# Patient Record
Sex: FEMALE | Race: WHITE | NOT HISPANIC OR LATINO | URBAN - METROPOLITAN AREA
[De-identification: names, ages, dates, MRNs, and addresses within clinical notes are randomized per-mention and may not be internally consistent; named-entity substitution may affect disease eponyms.]

---

## 2018-01-07 ENCOUNTER — HOSPITAL ENCOUNTER (INPATIENT)
Facility: HOSPITAL | Age: 78
LOS: 17 days | Discharge: NURSING, REHAB OR LONG-TERM CARE | DRG: 871 | End: 2018-01-24
Attending: EMERGENCY MEDICINE | Admitting: INTERNAL MEDICINE
Payer: COMMERCIAL

## 2018-01-07 ENCOUNTER — APPOINTMENT (EMERGENCY)
Dept: RADIOLOGY | Facility: HOSPITAL | Age: 78
DRG: 871 | End: 2018-01-07
Payer: COMMERCIAL

## 2018-01-07 ENCOUNTER — APPOINTMENT (EMERGENCY)
Dept: CT IMAGING | Facility: HOSPITAL | Age: 78
DRG: 871 | End: 2018-01-07
Payer: COMMERCIAL

## 2018-01-07 ENCOUNTER — APPOINTMENT (INPATIENT)
Dept: RADIOLOGY | Facility: HOSPITAL | Age: 78
DRG: 871 | End: 2018-01-07
Payer: COMMERCIAL

## 2018-01-07 DIAGNOSIS — K21.9 CHRONIC GERD: ICD-10-CM

## 2018-01-07 DIAGNOSIS — R53.81 DEBILITY: ICD-10-CM

## 2018-01-07 DIAGNOSIS — E87.6 HYPOKALEMIA: ICD-10-CM

## 2018-01-07 DIAGNOSIS — N89.8 VAGINAL DISCHARGE: ICD-10-CM

## 2018-01-07 DIAGNOSIS — E86.0 SEVERE DEHYDRATION: ICD-10-CM

## 2018-01-07 DIAGNOSIS — G93.40 ENCEPHALOPATHY: ICD-10-CM

## 2018-01-07 DIAGNOSIS — E11.01 HHNC (HYPERGLYCEMIC HYPEROSMOLAR NONKETOTIC COMA) (HCC): Primary | ICD-10-CM

## 2018-01-07 DIAGNOSIS — J96.01 ACUTE RESPIRATORY FAILURE WITH HYPOXIA (HCC): ICD-10-CM

## 2018-01-07 DIAGNOSIS — R77.8 ELEVATED TROPONIN: ICD-10-CM

## 2018-01-07 PROBLEM — N17.9 ACUTE KIDNEY INJURY (HCC): Status: ACTIVE | Noted: 2018-01-07

## 2018-01-07 LAB
ACETONE SERPL-MCNC: NEGATIVE MG/DL
ALBUMIN SERPL BCP-MCNC: 2.4 G/DL (ref 3.5–5)
ALBUMIN SERPL BCP-MCNC: 2.7 G/DL (ref 3.5–5)
ALBUMIN SERPL BCP-MCNC: 3.4 G/DL (ref 3.5–5)
ALP SERPL-CCNC: 128 U/L (ref 46–116)
ALP SERPL-CCNC: 148 U/L (ref 46–116)
ALP SERPL-CCNC: 187 U/L (ref 46–116)
ALT SERPL W P-5'-P-CCNC: 12 U/L (ref 12–78)
ALT SERPL W P-5'-P-CCNC: 14 U/L (ref 12–78)
ALT SERPL W P-5'-P-CCNC: 15 U/L (ref 12–78)
AMMONIA PLAS-SCNC: 50 UMOL/L (ref 11–35)
AMPHETAMINES SERPL QL SCN: NEGATIVE
ANION GAP SERPL CALCULATED.3IONS-SCNC: 14 MMOL/L (ref 4–13)
ANION GAP SERPL CALCULATED.3IONS-SCNC: 14 MMOL/L (ref 4–13)
ANION GAP SERPL CALCULATED.3IONS-SCNC: 16 MMOL/L (ref 4–13)
ANION GAP SERPL CALCULATED.3IONS-SCNC: 24 MMOL/L (ref 4–13)
APTT PPP: 29 SECONDS (ref 23–35)
ARTERIAL PATENCY WRIST A: YES
AST SERPL W P-5'-P-CCNC: 13 U/L (ref 5–45)
AST SERPL W P-5'-P-CCNC: 20 U/L (ref 5–45)
AST SERPL W P-5'-P-CCNC: 23 U/L (ref 5–45)
BACTERIA UR QL AUTO: ABNORMAL /HPF
BARBITURATES UR QL: NEGATIVE
BASE EX.OXY STD BLDV CALC-SCNC: 57.5 % (ref 60–80)
BASE EXCESS BLDA CALC-SCNC: 1.5 MMOL/L
BASE EXCESS BLDV CALC-SCNC: 4.3 MMOL/L
BASOPHILS # BLD MANUAL: 0 THOUSAND/UL (ref 0–0.1)
BASOPHILS NFR MAR MANUAL: 0 % (ref 0–1)
BENZODIAZ UR QL: NEGATIVE
BILIRUB SERPL-MCNC: 0.51 MG/DL (ref 0.2–1)
BILIRUB SERPL-MCNC: 0.56 MG/DL (ref 0.2–1)
BILIRUB SERPL-MCNC: 0.76 MG/DL (ref 0.2–1)
BILIRUB UR QL STRIP: NEGATIVE
BODY TEMPERATURE: 101.1 DEGREES FEHRENHEIT
BUN SERPL-MCNC: 21 MG/DL (ref 5–25)
BUN SERPL-MCNC: 22 MG/DL (ref 5–25)
BUN SERPL-MCNC: 22 MG/DL (ref 5–25)
BUN SERPL-MCNC: 23 MG/DL (ref 5–25)
CA-I BLD-SCNC: 1.1 MMOL/L (ref 1.12–1.32)
CALCIUM SERPL-MCNC: 10.5 MG/DL (ref 8.3–10.1)
CALCIUM SERPL-MCNC: 8.9 MG/DL (ref 8.3–10.1)
CALCIUM SERPL-MCNC: 9 MG/DL (ref 8.3–10.1)
CALCIUM SERPL-MCNC: 9.3 MG/DL (ref 8.3–10.1)
CHLORIDE SERPL-SCNC: 103 MMOL/L (ref 100–108)
CHLORIDE SERPL-SCNC: 109 MMOL/L (ref 100–108)
CHLORIDE SERPL-SCNC: 75 MMOL/L (ref 100–108)
CHLORIDE SERPL-SCNC: 97 MMOL/L (ref 100–108)
CK MB SERPL-MCNC: 1.9 % (ref 0–2.5)
CK MB SERPL-MCNC: 5.6 NG/ML (ref 0–5)
CK SERPL-CCNC: 122 U/L (ref 26–192)
CK SERPL-CCNC: 300 U/L (ref 26–192)
CLARITY UR: CLEAR
CO2 SERPL-SCNC: 27 MMOL/L (ref 21–32)
CO2 SERPL-SCNC: 27 MMOL/L (ref 21–32)
CO2 SERPL-SCNC: 28 MMOL/L (ref 21–32)
CO2 SERPL-SCNC: 30 MMOL/L (ref 21–32)
COCAINE UR QL: NEGATIVE
COLOR UR: YELLOW
CREAT SERPL-MCNC: 1.53 MG/DL (ref 0.6–1.3)
CREAT SERPL-MCNC: 1.75 MG/DL (ref 0.6–1.3)
CREAT SERPL-MCNC: 1.96 MG/DL (ref 0.6–1.3)
CREAT SERPL-MCNC: 2.26 MG/DL (ref 0.6–1.3)
EOSINOPHIL # BLD MANUAL: 0 THOUSAND/UL (ref 0–0.4)
EOSINOPHIL NFR BLD MANUAL: 0 % (ref 0–6)
ERYTHROCYTE [DISTWIDTH] IN BLOOD BY AUTOMATED COUNT: 13.8 % (ref 11.6–15.1)
ETHANOL SERPL-MCNC: <3 MG/DL (ref 0–3)
FIO2: ABNORMAL %
GFR SERPL CREATININE-BSD FRML MDRD: 20 ML/MIN/1.73SQ M
GFR SERPL CREATININE-BSD FRML MDRD: 24 ML/MIN/1.73SQ M
GFR SERPL CREATININE-BSD FRML MDRD: 27 ML/MIN/1.73SQ M
GFR SERPL CREATININE-BSD FRML MDRD: 32 ML/MIN/1.73SQ M
GLUCOSE SERPL-MCNC: 1470 MG/DL (ref 65–140)
GLUCOSE SERPL-MCNC: 394 MG/DL (ref 65–140)
GLUCOSE SERPL-MCNC: 470 MG/DL (ref 65–140)
GLUCOSE SERPL-MCNC: 681 MG/DL (ref 65–140)
GLUCOSE SERPL-MCNC: 930 MG/DL (ref 65–140)
GLUCOSE SERPL-MCNC: >500 MG/DL (ref 65–140)
GLUCOSE SERPL-MCNC: >500 MG/DL (ref 65–140)
GLUCOSE UR STRIP-MCNC: ABNORMAL MG/DL
HCO3 BLDA-SCNC: 26.3 MMOL/L (ref 22–28)
HCO3 BLDV-SCNC: 33.2 MMOL/L (ref 24–30)
HCT VFR BLD AUTO: 49.9 % (ref 34.8–46.1)
HGB BLD-MCNC: 17.9 G/DL (ref 11.5–15.4)
HGB UR QL STRIP.AUTO: ABNORMAL
INR PPP: 1.22 (ref 0.86–1.16)
KETONES UR STRIP-MCNC: NEGATIVE MG/DL
LACTATE SERPL-SCNC: 12.6 MMOL/L (ref 0.5–2)
LACTATE SERPL-SCNC: 3.2 MMOL/L (ref 0.5–2)
LACTATE SERPL-SCNC: 3.9 MMOL/L (ref 0.5–2)
LACTATE SERPL-SCNC: 5.3 MMOL/L (ref 0.5–2)
LACTATE SERPL-SCNC: 8.5 MMOL/L (ref 0.5–2)
LACTATE SERPL-SCNC: 9.2 MMOL/L (ref 0.5–2)
LEUKOCYTE ESTERASE UR QL STRIP: ABNORMAL
LG PLATELETS BLD QL SMEAR: PRESENT
LIPASE SERPL-CCNC: 285 U/L (ref 73–393)
LYMPHOCYTES # BLD AUTO: 0.83 THOUSAND/UL (ref 0.6–4.47)
LYMPHOCYTES # BLD AUTO: 5 % (ref 14–44)
MAGNESIUM SERPL-MCNC: 2.4 MG/DL (ref 1.6–2.6)
MCH RBC QN AUTO: 29.8 PG (ref 26.8–34.3)
MCHC RBC AUTO-ENTMCNC: 35.9 G/DL (ref 31.4–37.4)
MCV RBC AUTO: 83 FL (ref 82–98)
METAMYELOCYTES NFR BLD MANUAL: 1 % (ref 0–1)
METHADONE UR QL: NEGATIVE
MONOCYTES # BLD AUTO: 1.49 THOUSAND/UL (ref 0–1.22)
MONOCYTES NFR BLD: 9 % (ref 4–12)
NEUTROPHILS # BLD MANUAL: 14.06 THOUSAND/UL (ref 1.85–7.62)
NEUTS SEG NFR BLD AUTO: 85 % (ref 43–75)
NITRITE UR QL STRIP: NEGATIVE
NON-SQ EPI CELLS URNS QL MICRO: ABNORMAL /HPF
NRBC BLD AUTO-RTO: 0 /100 WBCS
O2 CT BLDA-SCNC: 21 ML/DL (ref 16–23)
O2 CT BLDV-SCNC: 13.5 ML/DL
OPIATES UR QL SCN: NEGATIVE
OSMOLALITY UR/SERPL-RTO: 373 MMOL/KG (ref 282–298)
OSMOLALITY UR/SERPL-RTO: 376 MMOL/KG (ref 282–298)
OTHER STN SPEC: ABNORMAL
OXYHGB MFR BLDA: 98.3 % (ref 94–97)
PCO2 BLDA: 41.8 MM HG (ref 36–44)
PCO2 BLDV: 67 MM HG (ref 42–50)
PCP UR QL: NEGATIVE
PH BLDA: 7.42 [PH] (ref 7.35–7.45)
PH BLDV: 7.31 [PH] (ref 7.3–7.4)
PH UR STRIP.AUTO: 5 [PH] (ref 4.5–8)
PLATELET # BLD AUTO: 590 THOUSANDS/UL (ref 149–390)
PLATELET BLD QL SMEAR: ABNORMAL
PMV BLD AUTO: 12.5 FL (ref 8.9–12.7)
PO2 BLDA: 127.6 MM HG (ref 75–129)
PO2 BLDV: 33.6 MM HG (ref 35–45)
POTASSIUM SERPL-SCNC: 2.5 MMOL/L (ref 3.5–5.3)
POTASSIUM SERPL-SCNC: 3.2 MMOL/L (ref 3.5–5.3)
POTASSIUM SERPL-SCNC: 3.4 MMOL/L (ref 3.5–5.3)
POTASSIUM SERPL-SCNC: 4 MMOL/L (ref 3.5–5.3)
PROT SERPL-MCNC: 10.1 G/DL (ref 6.4–8.2)
PROT SERPL-MCNC: 7.1 G/DL (ref 6.4–8.2)
PROT SERPL-MCNC: 7.9 G/DL (ref 6.4–8.2)
PROT UR STRIP-MCNC: NEGATIVE MG/DL
PROTHROMBIN TIME: 15.5 SECONDS (ref 12.1–14.4)
RBC # BLD AUTO: 5.79 MILLION/UL (ref 3.81–5.12)
RBC #/AREA URNS AUTO: ABNORMAL /HPF
RBC MORPH BLD: NORMAL
SODIUM SERPL-SCNC: 127 MMOL/L (ref 136–145)
SODIUM SERPL-SCNC: 138 MMOL/L (ref 136–145)
SODIUM SERPL-SCNC: 149 MMOL/L (ref 136–145)
SODIUM SERPL-SCNC: 150 MMOL/L (ref 136–145)
SP GR UR STRIP.AUTO: <=1.005 (ref 1–1.03)
SPECIMEN SOURCE: ABNORMAL
SPECIMEN SOURCE: ABNORMAL
THC UR QL: NEGATIVE
TOTAL CELLS COUNTED SPEC: 100
TROPONIN I BLD-MCNC: 0.19 NG/ML (ref 0–0.08)
UROBILINOGEN UR QL STRIP.AUTO: 0.2 E.U./DL
VENT- APRV: ABNORMAL
WBC # BLD AUTO: 16.54 THOUSAND/UL (ref 4.31–10.16)
WBC #/AREA URNS AUTO: ABNORMAL /HPF

## 2018-01-07 PROCEDURE — 83605 ASSAY OF LACTIC ACID: CPT | Performed by: NURSE PRACTITIONER

## 2018-01-07 PROCEDURE — 71045 X-RAY EXAM CHEST 1 VIEW: CPT

## 2018-01-07 PROCEDURE — 70450 CT HEAD/BRAIN W/O DYE: CPT

## 2018-01-07 PROCEDURE — 36415 COLL VENOUS BLD VENIPUNCTURE: CPT | Performed by: EMERGENCY MEDICINE

## 2018-01-07 PROCEDURE — 93005 ELECTROCARDIOGRAM TRACING: CPT | Performed by: EMERGENCY MEDICINE

## 2018-01-07 PROCEDURE — 82805 BLOOD GASES W/O2 SATURATION: CPT | Performed by: NURSE PRACTITIONER

## 2018-01-07 PROCEDURE — 99285 EMERGENCY DEPT VISIT HI MDM: CPT

## 2018-01-07 PROCEDURE — 80048 BASIC METABOLIC PNL TOTAL CA: CPT | Performed by: INTERNAL MEDICINE

## 2018-01-07 PROCEDURE — 82805 BLOOD GASES W/O2 SATURATION: CPT | Performed by: EMERGENCY MEDICINE

## 2018-01-07 PROCEDURE — 96376 TX/PRO/DX INJ SAME DRUG ADON: CPT

## 2018-01-07 PROCEDURE — 82553 CREATINE MB FRACTION: CPT | Performed by: NURSE PRACTITIONER

## 2018-01-07 PROCEDURE — 96375 TX/PRO/DX INJ NEW DRUG ADDON: CPT

## 2018-01-07 PROCEDURE — 5A1945Z RESPIRATORY VENTILATION, 24-96 CONSECUTIVE HOURS: ICD-10-PCS | Performed by: HOSPITALIST

## 2018-01-07 PROCEDURE — 85027 COMPLETE CBC AUTOMATED: CPT | Performed by: EMERGENCY MEDICINE

## 2018-01-07 PROCEDURE — 82948 REAGENT STRIP/BLOOD GLUCOSE: CPT

## 2018-01-07 PROCEDURE — 0BH17EZ INSERTION OF ENDOTRACHEAL AIRWAY INTO TRACHEA, VIA NATURAL OR ARTIFICIAL OPENING: ICD-10-PCS | Performed by: HOSPITALIST

## 2018-01-07 PROCEDURE — 87077 CULTURE AEROBIC IDENTIFY: CPT

## 2018-01-07 PROCEDURE — 36620 INSERTION CATHETER ARTERY: CPT

## 2018-01-07 PROCEDURE — 93005 ELECTROCARDIOGRAM TRACING: CPT

## 2018-01-07 PROCEDURE — 82140 ASSAY OF AMMONIA: CPT | Performed by: EMERGENCY MEDICINE

## 2018-01-07 PROCEDURE — 82330 ASSAY OF CALCIUM: CPT | Performed by: NURSE PRACTITIONER

## 2018-01-07 PROCEDURE — 94002 VENT MGMT INPAT INIT DAY: CPT

## 2018-01-07 PROCEDURE — 80307 DRUG TEST PRSMV CHEM ANLYZR: CPT | Performed by: EMERGENCY MEDICINE

## 2018-01-07 PROCEDURE — 87040 BLOOD CULTURE FOR BACTERIA: CPT | Performed by: EMERGENCY MEDICINE

## 2018-01-07 PROCEDURE — 82550 ASSAY OF CK (CPK): CPT | Performed by: NURSE PRACTITIONER

## 2018-01-07 PROCEDURE — 80320 DRUG SCREEN QUANTALCOHOLS: CPT | Performed by: EMERGENCY MEDICINE

## 2018-01-07 PROCEDURE — 85007 BL SMEAR W/DIFF WBC COUNT: CPT | Performed by: EMERGENCY MEDICINE

## 2018-01-07 PROCEDURE — 87086 URINE CULTURE/COLONY COUNT: CPT

## 2018-01-07 PROCEDURE — 81002 URINALYSIS NONAUTO W/O SCOPE: CPT | Performed by: EMERGENCY MEDICINE

## 2018-01-07 PROCEDURE — 82009 KETONE BODYS QUAL: CPT | Performed by: EMERGENCY MEDICINE

## 2018-01-07 PROCEDURE — 87077 CULTURE AEROBIC IDENTIFY: CPT | Performed by: EMERGENCY MEDICINE

## 2018-01-07 PROCEDURE — 85730 THROMBOPLASTIN TIME PARTIAL: CPT | Performed by: EMERGENCY MEDICINE

## 2018-01-07 PROCEDURE — 96374 THER/PROPH/DIAG INJ IV PUSH: CPT

## 2018-01-07 PROCEDURE — 83690 ASSAY OF LIPASE: CPT | Performed by: EMERGENCY MEDICINE

## 2018-01-07 PROCEDURE — 83930 ASSAY OF BLOOD OSMOLALITY: CPT | Performed by: EMERGENCY MEDICINE

## 2018-01-07 PROCEDURE — 83930 ASSAY OF BLOOD OSMOLALITY: CPT | Performed by: NURSE PRACTITIONER

## 2018-01-07 PROCEDURE — 87186 SC STD MICRODIL/AGAR DIL: CPT | Performed by: EMERGENCY MEDICINE

## 2018-01-07 PROCEDURE — 80053 COMPREHEN METABOLIC PANEL: CPT | Performed by: NURSE PRACTITIONER

## 2018-01-07 PROCEDURE — 83605 ASSAY OF LACTIC ACID: CPT | Performed by: EMERGENCY MEDICINE

## 2018-01-07 PROCEDURE — 96361 HYDRATE IV INFUSION ADD-ON: CPT

## 2018-01-07 PROCEDURE — 83735 ASSAY OF MAGNESIUM: CPT | Performed by: NURSE PRACTITIONER

## 2018-01-07 PROCEDURE — 85610 PROTHROMBIN TIME: CPT | Performed by: EMERGENCY MEDICINE

## 2018-01-07 PROCEDURE — 87186 SC STD MICRODIL/AGAR DIL: CPT

## 2018-01-07 PROCEDURE — 84484 ASSAY OF TROPONIN QUANT: CPT

## 2018-01-07 PROCEDURE — 74176 CT ABD & PELVIS W/O CONTRAST: CPT

## 2018-01-07 PROCEDURE — 80053 COMPREHEN METABOLIC PANEL: CPT | Performed by: EMERGENCY MEDICINE

## 2018-01-07 PROCEDURE — 81001 URINALYSIS AUTO W/SCOPE: CPT

## 2018-01-07 RX ORDER — ONDANSETRON 2 MG/ML
4 INJECTION INTRAMUSCULAR; INTRAVENOUS ONCE
Status: COMPLETED | OUTPATIENT
Start: 2018-01-07 | End: 2018-01-07

## 2018-01-07 RX ORDER — PROPOFOL 10 MG/ML
5-50 INJECTION, EMULSION INTRAVENOUS
Status: DISCONTINUED | OUTPATIENT
Start: 2018-01-07 | End: 2018-01-10

## 2018-01-07 RX ORDER — POTASSIUM CHLORIDE 20 MEQ/1
40 TABLET, EXTENDED RELEASE ORAL ONCE
Status: COMPLETED | OUTPATIENT
Start: 2018-01-07 | End: 2018-01-07

## 2018-01-07 RX ORDER — DEXTROSE AND SODIUM CHLORIDE 5; .45 G/100ML; G/100ML
125 INJECTION, SOLUTION INTRAVENOUS CONTINUOUS
Status: DISCONTINUED | OUTPATIENT
Start: 2018-01-07 | End: 2018-01-09

## 2018-01-07 RX ORDER — HEPARIN SODIUM 5000 [USP'U]/ML
5000 INJECTION, SOLUTION INTRAVENOUS; SUBCUTANEOUS EVERY 8 HOURS SCHEDULED
Status: DISCONTINUED | OUTPATIENT
Start: 2018-01-07 | End: 2018-01-24 | Stop reason: HOSPADM

## 2018-01-07 RX ORDER — HALOPERIDOL 5 MG/ML
2 INJECTION INTRAMUSCULAR EVERY 6 HOURS PRN
Status: DISCONTINUED | OUTPATIENT
Start: 2018-01-07 | End: 2018-01-10

## 2018-01-07 RX ORDER — POTASSIUM CHLORIDE AND SODIUM CHLORIDE 900; 300 MG/100ML; MG/100ML
125 INJECTION, SOLUTION INTRAVENOUS CONTINUOUS
Status: DISCONTINUED | OUTPATIENT
Start: 2018-01-07 | End: 2018-01-07

## 2018-01-07 RX ORDER — DEXTROSE MONOHYDRATE 50 MG/ML
125 INJECTION, SOLUTION INTRAVENOUS CONTINUOUS
Status: DISCONTINUED | OUTPATIENT
Start: 2018-01-07 | End: 2018-01-09

## 2018-01-07 RX ORDER — FOLIC ACID 5 MG/ML
1 INJECTION, SOLUTION INTRAMUSCULAR; INTRAVENOUS; SUBCUTANEOUS DAILY
Status: DISCONTINUED | OUTPATIENT
Start: 2018-01-08 | End: 2018-01-07

## 2018-01-07 RX ORDER — DIAZEPAM 5 MG/ML
2.5 INJECTION, SOLUTION INTRAMUSCULAR; INTRAVENOUS ONCE
Status: COMPLETED | OUTPATIENT
Start: 2018-01-07 | End: 2018-01-07

## 2018-01-07 RX ORDER — POTASSIUM CHLORIDE 20 MEQ/1
40 TABLET, EXTENDED RELEASE ORAL ONCE
Status: DISCONTINUED | OUTPATIENT
Start: 2018-01-07 | End: 2018-01-07

## 2018-01-07 RX ORDER — ALBUMIN (HUMAN) 12.5 G/50ML
50 SOLUTION INTRAVENOUS ONCE
Status: COMPLETED | OUTPATIENT
Start: 2018-01-07 | End: 2018-01-08

## 2018-01-07 RX ORDER — PROPOFOL 10 MG/ML
INJECTION, EMULSION INTRAVENOUS
Status: COMPLETED
Start: 2018-01-07 | End: 2018-01-08

## 2018-01-07 RX ADMIN — Medication 20 MEQ: at 11:24

## 2018-01-07 RX ADMIN — FAMOTIDINE 20 MG: 10 INJECTION, SOLUTION INTRAVENOUS at 11:15

## 2018-01-07 RX ADMIN — SODIUM CHLORIDE 1000 ML: 0.9 INJECTION, SOLUTION INTRAVENOUS at 12:38

## 2018-01-07 RX ADMIN — DIAZEPAM 2.5 MG: 5 INJECTION, SOLUTION INTRAMUSCULAR; INTRAVENOUS at 18:59

## 2018-01-07 RX ADMIN — CEFEPIME HYDROCHLORIDE 1000 MG: 1 INJECTION, SOLUTION INTRAVENOUS at 15:02

## 2018-01-07 RX ADMIN — POTASSIUM CHLORIDE 40 MEQ: 1500 TABLET, EXTENDED RELEASE ORAL at 14:21

## 2018-01-07 RX ADMIN — HEPARIN SODIUM 5000 UNITS: 5000 INJECTION, SOLUTION INTRAVENOUS; SUBCUTANEOUS at 14:14

## 2018-01-07 RX ADMIN — ONDANSETRON 4 MG: 2 INJECTION INTRAMUSCULAR; INTRAVENOUS at 10:06

## 2018-01-07 RX ADMIN — SODIUM CHLORIDE 18 UNITS/HR: 9 INJECTION, SOLUTION INTRAVENOUS at 18:01

## 2018-01-07 RX ADMIN — ALBUMIN HUMAN 50 G: 0.25 SOLUTION INTRAVENOUS at 22:41

## 2018-01-07 RX ADMIN — SODIUM CHLORIDE 12 UNITS/HR: 9 INJECTION, SOLUTION INTRAVENOUS at 22:07

## 2018-01-07 RX ADMIN — LEVETIRACETAM 1000 MG: 100 INJECTION, SOLUTION INTRAVENOUS at 20:57

## 2018-01-07 RX ADMIN — POTASSIUM CHLORIDE AND SODIUM CHLORIDE 100 ML/HR: 900; 300 INJECTION, SOLUTION INTRAVENOUS at 15:13

## 2018-01-07 RX ADMIN — ONDANSETRON 4 MG: 2 INJECTION INTRAMUSCULAR; INTRAVENOUS at 12:02

## 2018-01-07 RX ADMIN — DEXTROSE AND SODIUM CHLORIDE 125 ML/HR: 5; 450 INJECTION, SOLUTION INTRAVENOUS at 20:00

## 2018-01-07 RX ADMIN — SODIUM CHLORIDE 1000 ML: 0.9 INJECTION, SOLUTION INTRAVENOUS at 10:48

## 2018-01-07 RX ADMIN — HEPARIN SODIUM 5000 UNITS: 5000 INJECTION, SOLUTION INTRAVENOUS; SUBCUTANEOUS at 21:33

## 2018-01-07 RX ADMIN — HALOPERIDOL LACTATE 2 MG: 5 INJECTION, SOLUTION INTRAMUSCULAR at 17:52

## 2018-01-07 RX ADMIN — POTASSIUM CHLORIDE 20 MEQ: 2 INJECTION, SOLUTION, CONCENTRATE INTRAVENOUS at 18:01

## 2018-01-07 RX ADMIN — SODIUM CHLORIDE 1000 ML: 0.9 INJECTION, SOLUTION INTRAVENOUS at 09:59

## 2018-01-07 NOTE — ED NOTES
Patient began vomiting on attempt to insert vang  MD aware meds ordered   Will place vang after patient is no longer nauseous or vomiting     Rosie Colmenares RN  01/07/18 9317

## 2018-01-07 NOTE — ED PROVIDER NOTES
History  Chief Complaint   Patient presents with    Failure To Thrive     Patient lives alone and neighbor checks on patient a few times a day  Patient found this am on her floor up against her bed lying on her L side  Aproximate down time was 2 hrs  Patient is reported to be taking meds but no meds were found in the house  Food in patient s refridgerator has been  for 2 months  Patient stating "she doesnt want to be here any more" to EMS  Patient incontinent in soiled brief     Pt  Was found down on the ground next to her bed this am   She was there for about 2 hours  Pt  Lives alone and the neighbor checks on her once in awhile but all the food in her refrigerator was  - they don't think that she's eaten in awhile  No fevers  Pt  Answers questions appropriately in the ER but has eyes closed and not talking much  She c/o abd  Pain and vomiting today, no cp, no sob  None       Past Medical History:   Diagnosis Date    Arthritis     Diabetes mellitus ([facility] [address] )     Hypertension     Memory loss        History reviewed  No pertinent surgical history  History reviewed  No pertinent family history  I have reviewed and agree with the history as documented  Social History   Substance Use Topics    Smoking status: Former Smoker    Smokeless tobacco: Not on file    Alcohol use Not on file        Review of Systems   Unable to perform ROS: Mental status change   Constitutional: Negative for appetite change, fatigue and fever  HENT: Negative for rhinorrhea and sore throat  Respiratory: Negative for cough, shortness of breath and wheezing  Cardiovascular: Negative for chest pain and leg swelling  Gastrointestinal: Negative for abdominal pain, diarrhea and vomiting  Genitourinary: Negative for dysuria and flank pain  Musculoskeletal: Negative for back pain and neck pain  Skin: Negative for rash  Neurological: Negative for headaches     Psychiatric/Behavioral: Mood normal       Physical Exam  ED Triage Vitals   Temperature Pulse Respirations Blood Pressure SpO2   01/07/18 0921 01/07/18 0921 01/07/18 0921 01/07/18 0921 01/07/18 0924   (!) 96 8 °F (36 °C) (!) 133 (!) 36 130/61 100 %      Temp Source Heart Rate Source Patient Position - Orthostatic VS BP Location FiO2 (%)   01/07/18 0921 01/07/18 0921 01/07/18 0921 01/07/18 0921 --   Temporal Monitor Lying Right arm       Pain Score       01/07/18 1305       No Pain           Orthostatic Vital Signs  Vitals:    01/16/18 2304 01/17/18 0745 01/17/18 1514 01/17/18 2332   BP: 113/63 101/67 107/59 102/50   Pulse: (!) 109 (!) 112 97 98   Patient Position - Orthostatic VS: Lying Lying Lying Lying       Physical Exam   Constitutional: She appears well-developed  HENT:   Head: Normocephalic and atraumatic  Dry MM   Eyes: Pupils are equal, round, and reactive to light  Neck: Normal range of motion  Neck supple  nontender   Cardiovascular:   tachycardic   Pulmonary/Chest: Effort normal and breath sounds normal  No respiratory distress  Abdominal: Soft  Periumbilical tenderness, no r/g   Musculoskeletal: Normal range of motion  Neurological: She is alert  Oriented to person only, eyes closed - opens to command and answers questions in short one word answers at times  Skin: Skin is warm and dry  Nursing note and vitals reviewed        ED Medications  Medications   potassium chloride 20 mEq in D5W 100 mL (20 mEq Intravenous Not Given 1/7/18 1430)   heparin (porcine) subcutaneous injection 5,000 Units (5,000 Units Subcutaneous Given 1/18/18 0538)   aspirin chewable tablet 81 mg (81 mg Oral Given 1/17/18 0903)   senna-docusate sodium (SENOKOT S) 8 6-50 mg per tablet 1 tablet (1 tablet Oral Given 1/17/18 2218)   insulin lispro (HumaLOG) 100 units/mL subcutaneous injection 1-6 Units (2 Units Subcutaneous Given 1/17/18 1814)   insulin lispro (HumaLOG) 100 units/mL subcutaneous injection 1-6 Units (1 Units Subcutaneous Not Given 1/17/18 2217)   guaiFENesin (ROBITUSSIN) oral solution 200 mg (200 mg Oral Not Given 1/18/18 0521)   acetaminophen (TYLENOL) tablet 975 mg (975 mg Oral Given 1/18/18 0535)   insulin glargine (LANTUS) subcutaneous injection 15 Units (15 Units Subcutaneous Given 1/17/18 2219)   melatonin tablet 3 mg (not administered)   cholecalciferol (VITAMIN D3) tablet 1,000 Units (not administered)   sodium chloride 0 9 % bolus 1,000 mL (0 mL Intravenous Stopped 1/7/18 1115)   ondansetron (ZOFRAN) injection 4 mg (4 mg Intravenous Given 1/7/18 1006)   sodium chloride 0 9 % bolus 1,000 mL (0 mL Intravenous Stopped 1/7/18 1239)   famotidine (PEPCID) injection 20 mg (20 mg Intravenous Given 1/7/18 1115)   ondansetron (ZOFRAN) injection 4 mg (4 mg Intravenous Given 1/7/18 1202)   sodium chloride 0 9 % bolus 1,000 mL (0 mL Intravenous Stopped 1/7/18 1425)   potassium chloride (K-DUR,KLOR-CON) CR tablet 40 mEq (40 mEq Oral Given 1/7/18 1421)   potassium chloride 20 mEq in D5W 100 mL (20 mEq Intravenous Given 1/7/18 1801)   diazepam (VALIUM) injection 2 5 mg (2 5 mg Intravenous Given 1/7/18 1859)   levETIRAcetam (KEPPRA) 1,000 mg in sodium chloride 0 9 % 100 mL IVPB (0 mg Intravenous Stopped 1/8/18 1502)   albumin human (FLEXBUMIN) 25 % injection 50 g (0 g Intravenous Stopped 1/8/18 0747)   potassium chloride 20 mEq in D5W 100 mL (20 mEq Intravenous Given 1/8/18 0159)   potassium chloride 10 % oral solution 40 mEq (40 mEq Oral Given 1/8/18 0151)   potassium phosphate 30 mmol in sodium chloride 0 9 % 250 mL infusion (0 mmol Intravenous Stopped 1/9/18 0908)   lactated ringers bolus 500 mL (0 mL Intravenous Stopped 1/8/18 1600)   furosemide (LASIX) injection 40 mg (40 mg Intravenous Given 1/10/18 0612)   potassium phosphate 21 mmol in sodium chloride 0 9 % 250 mL infusion (0 mmol Intravenous Stopped 1/10/18 1250)   furosemide (LASIX) injection 40 mg (40 mg Intravenous Given 1/11/18 1054)   furosemide (LASIX) injection 40 mg (40 mg Intravenous Given 1/12/18 1151)   potassium chloride (K-DUR,KLOR-CON) CR tablet 20 mEq (20 mEq Oral Given 1/12/18 1852)   potassium chloride 10 % oral solution 40 mEq (40 mEq Oral Given 1/12/18 1358)   potassium chloride (K-DUR,KLOR-CON) CR tablet 20 mEq (20 mEq Oral Given 1/14/18 1503)   potassium chloride 10 % oral solution 40 mEq (40 mEq Oral Given 1/16/18 0912)   potassium chloride 10 % oral solution 40 mEq (40 mEq Oral Given 1/16/18 1853)   levofloxacin (LEVAQUIN) IVPB (premix) 500 mg (500 mg Intravenous New Bag 1/16/18 1406)   lactulose 20 g/30 mL oral solution 20 g (20 g Oral Given 1/17/18 1814)       Diagnostic Studies  Results Reviewed     Procedure Component Value Units Date/Time    Blood culture #2 [[id number]] Collected:  01/07/18 1000    Lab Status:  Final result Specimen:  Blood from Arm, Right Updated:  01/12/18 1801     Blood Culture No Growth After 5 Days      Blood culture #1 [[id number]]  (Abnormal)  (Susceptibility) Collected:  01/07/18 1048    Lab Status:  Final result Specimen:  Blood from Arm, Left Updated:  01/10/18 0746     Blood Culture --      Klebsiella pneumoniae (A)     Gram Stain Result Gram negative rods    Susceptibility      Klebsiella pneumoniae     SAPNA    Ampicillin ($$) >16 00 ug/ml Resistant    Ampicillin + Sulbactam ($) <=8/4 ug/ml Susceptible    Aztreonam ($$$)  <=8 ug/ml Susceptible    Cefazolin ($) <=8 00 ug/ml Susceptible    Ciprofloxacin ($)  <=1 00 ug/ml Susceptible    Gentamicin ($$) <=4 ug/ml Susceptible    Levofloxacin ($) <=2 00 ug/ml Susceptible    Piperacillin + Tazobactam ($$$) <=16 ug/ml Susceptible    Tetracycline <=4 ug/ml Susceptible    Tobramycin ($) <=4 ug/ml Susceptible    Trimethoprim + Sulfamethoxazole ($$$) <=2/38 ug/ml Susceptible                   Urine culture [[id number]]  (Abnormal)  (Susceptibility) Collected:  01/07/18 1041    Lab Status:  Final result Specimen:  Urine from Urine, Clean Catch Updated:  01/09/18 5993     Urine Culture 50,000-59,000 cfu/ml Klebsiella pneumoniae (A)    Susceptibility      Klebsiella pneumoniae     SAPNA    Ampicillin ($$) >16 00 ug/ml Resistant    Ampicillin + Sulbactam ($) <=8/4 ug/ml Susceptible    Aztreonam ($$$)  <=8 ug/ml Susceptible    Cefazolin ($) <=8 00 ug/ml Susceptible    Ciprofloxacin ($)  <=1 00 ug/ml Susceptible    Gentamicin ($$) <=4 ug/ml Susceptible    Levofloxacin ($) <=2 00 ug/ml Susceptible    Nitrofurantoin 64 ug/ml Intermediate    Piperacillin + Tazobactam ($$$) <=16 ug/ml Susceptible    Tetracycline <=4 ug/ml Susceptible    Tobramycin ($) <=4 ug/ml Susceptible    Trimethoprim + Sulfamethoxazole ($$$) <=2/38 ug/ml Susceptible                   Osmolality [[id number]]  (Abnormal) Collected:  01/07/18 1122    Lab Status:  Final result Specimen:  Blood from Arm, Left Updated:  01/07/18 2108     Osmolality Serum 373 (H) mmol/KG     Basic metabolic panel [[id number]]  (Abnormal) Collected:  01/07/18 1543    Lab Status:  Final result Specimen:  Blood from Arm, Left Updated:  01/07/18 1637     Sodium 138 mmol/L      Potassium 3 4 (L) mmol/L      Chloride 97 (L) mmol/L      CO2 27 mmol/L      Anion Gap 14 (H) mmol/L      BUN 21 mg/dL      Creatinine 1 53 (H) mg/dL      Glucose 930 (HH) mg/dL      Calcium 9 0 mg/dL      eGFR 32 ml/min/1 73sq m     Narrative:         National Kidney Disease Education Program recommendations are as follows:  GFR calculation is accurate only with a steady state creatinine  Chronic Kidney disease less than 60 ml/min/1 73 sq  meters  Kidney failure less than 15 ml/min/1 73 sq  meters      Osmolality [[id number]]  (Abnormal) Collected:  01/07/18 1000    Lab Status:  Final result Specimen:  Blood from Arm, Right Updated:  01/07/18 1548     Osmolality Serum 376 (H) mmol/KG     Lactic acid, plasma [[id number]]  (Abnormal) Collected:  01/07/18 1436    Lab Status:  Final result Specimen:  Blood from Arm, Left Updated:  01/07/18 1514     LACTIC ACID 3 9 (HH) mmol/L     Narrative:         Result may be elevated if tourniquet was used during collection  CK (with reflex to MB) [[id number]]  (Normal) Collected:  01/07/18 1000    Lab Status:  Final result Specimen:  Blood from Arm, Right Updated:  01/07/18 1411     Total  U/L     Platelet count [[id number]]     Lab Status:  No result Specimen:  Blood     Lactic acid, plasma [[id number]]  (Abnormal) Collected:  01/07/18 1239    Lab Status:  Final result Specimen:  Blood from Arm, Left Updated:  01/07/18 1306     LACTIC ACID 5 3 (HH) mmol/L     Narrative:         Result may be elevated if tourniquet was used during collection  Acetone [[id number]]  (Normal) Collected:  01/07/18 1122    Lab Status:  Final result Specimen:  Blood from Arm, Left Updated:  01/07/18 1138     Acetone, Bld Negative    Blood gas, venous [[id number]]  (Abnormal) Collected:  01/07/18 1122    Lab Status:  Final result Specimen:  Blood from Arm, Left Updated:  01/07/18 1128     pH, Prashanth 7 313     pCO2, Prashanth 67 0 (H) mm Hg      pO2, Prashanth 33 6 (L) mm Hg      HCO3, Prashanth 33 2 (H) mmol/L      Base Excess, Prashanth 4 3 mmol/L      O2 Content, Prashanth 13 5 ml/dL      O2 HGB, VENOUS 57 5 (L) %     CBC and differential [[id number]]  (Abnormal) Collected:  01/07/18 1000    Lab Status:  Edited Result - FINAL Specimen:  Blood from Arm, Right Updated:  01/07/18 1123     WBC 16 54 (H) Thousand/uL      RBC 5 79 (H) Million/uL      Hemoglobin 17 9 (H) g/dL      Hematocrit 49 9 (H) %      MCV 83 fL      MCH 29 8 pg      MCHC 35 9 g/dL      RDW 13 8 %      MPV 12 5 fL      Platelets 685 (H) Thousands/uL      nRBC 0 /100 WBCs     Narrative: This is an appended report  These results have been appended to a previously verified report      Rapid drug screen, urine [[id number]]  (Normal) Collected:  01/07/18 1031    Lab Status:  Final result Specimen:  Urine from Urine, Clean Catch Updated:  01/07/18 1114     Amph/Meth UR Negative     Barbiturate Ur Negative     Benzodiazepine Urine Negative     Cocaine Urine Negative     Methadone Urine Negative     Opiate Urine Negative     PCP Ur Negative     THC Urine Negative    Narrative:         FOR MEDICAL PURPOSES ONLY  IF CONFIRMATION NEEDED PLEASE CONTACT THE LAB WITHIN 5 DAYS  Drug Screen Cutoff Levels:  AMPHETAMINE/METHAMPHETAMINES  1000 ng/mL  BARBITURATES     200 ng/mL  BENZODIAZEPINES     200 ng/mL  COCAINE      300 ng/mL  METHADONE      300 ng/mL  OPIATES      300 ng/mL  PHENCYCLIDINE     25 ng/mL  THC       50 ng/mL    Urine Microscopic [[id number]]  (Abnormal) Collected:  01/07/18 1041    Lab Status:  Final result Specimen:  Urine from Urine, Clean Catch Updated:  01/07/18 1106     RBC, UA 10-20 (A) /hpf      WBC, UA 10-20 (A) /hpf      Epithelial Cells Occasional /hpf      Bacteria, UA Moderate (A) /hpf      OTHER OBSERVATIONS Yeast Cells Present    Comprehensive metabolic panel [[id number]]  (Abnormal) Collected:  01/07/18 1000    Lab Status:  Final result Specimen:  Blood from Arm, Right Updated:  01/07/18 1059     Sodium 127 (L) mmol/L      Potassium 2 5 (LL) mmol/L      Chloride 75 (L) mmol/L      CO2 28 mmol/L      Anion Gap 24 (H) mmol/L      BUN 22 mg/dL      Creatinine 2 26 (H) mg/dL      Glucose 1,470 (HH) mg/dL      Calcium 10 5 (H) mg/dL      AST 13 U/L      ALT 15 U/L      Alkaline Phosphatase 187 (H) U/L      Total Protein 10 1 (H) g/dL      Albumin 3 4 (L) g/dL      Total Bilirubin 0 76 mg/dL      eGFR 20 ml/min/1 73sq m     Narrative:         National Kidney Disease Education Program recommendations are as follows:  GFR calculation is accurate only with a steady state creatinine  Chronic Kidney disease less than 60 ml/min/1 73 sq  meters  Kidney failure less than 15 ml/min/1 73 sq  meters      POCT urinalysis dipstick [[id number]]  (Abnormal) Resulted:  01/07/18 1045    Lab Status:  Final result Specimen:  Urine Updated:  01/07/18 1045    ED Urine Macroscopic [[id number]]  (Abnormal) Collected:  01/07/18 1041    Lab Status:  Final result Specimen:  Urine Updated:  01/07/18 1043 Color, UA Yellow     Clarity, UA Clear     pH, UA 5 0     Leukocytes, UA Trace (A)     Nitrite, UA Negative     Protein, UA Negative mg/dl      Glucose, UA >=1000 (1%) (A) mg/dl      Ketones, UA Negative mg/dl      Urobilinogen, UA 0 2 E U /dl      Bilirubin, UA Negative     Blood, UA Moderate (A)     Specific [place], UA <=1 005    Narrative:       CLINITEK RESULT    Ethanol [[id number]]  (Normal) Collected:  01/07/18 1000    Lab Status:  Final result Specimen:  Blood from Arm, Right Updated:  01/07/18 1042     Ethanol Lvl <3 mg/dL     Lipase [[id number]]  (Normal) Collected:  01/07/18 1000    Lab Status:  Final result Specimen:  Blood from Arm, Right Updated:  01/07/18 1040     Lipase 285 u/L     Lactic acid, plasma [[id number]]  (Abnormal) Collected:  01/07/18 1000    Lab Status:  Final result Specimen:  Blood from Arm, Right Updated:  01/07/18 1034     LACTIC ACID 12 6 (HH) mmol/L     Narrative:         Result may be elevated if tourniquet was used during collection  Protime-INR [[id number]]  (Abnormal) Collected:  01/07/18 1000    Lab Status:  Final result Specimen:  Blood from Arm, Right Updated:  01/07/18 1026     Protime 15 5 (H) seconds      INR 1 22 (H)    APTT [[id number]]  (Normal) Collected:  01/07/18 1000    Lab Status:  Final result Specimen:  Blood from Arm, Right Updated:  01/07/18 1026     PTT 29 seconds     Narrative:          Therapeutic Heparin Range = 60-90 seconds    Ammonia [[id number]]  (Abnormal) Collected:  01/07/18 1000    Lab Status:  Final result Specimen:  Blood from Arm, Right Updated:  01/07/18 1020     Ammonia 50 (H) umol/L     POCT troponin [[id number]]  (Abnormal) Collected:  01/07/18 1003    Lab Status:  Final result Updated:  01/07/18 1016     POC Troponin I 0 19 (H) ng/ml      Specimen Type VENOUS    Narrative:         [employer] i-Stat handheld analyzer 99% cutoff is > 0 08ng/mL in network Emergency Departments    o cTnI 99% cutoff is useful only when applied to patients in the clinical setting of myocardial ischemia  o cTnI 99% cutoff should be interpreted in the context of clinical history, ECG findings and possibly cardiac imaging to establish correct diagnosis  o cTnI 99% cutoff may be suggestive but clearly not indicative of a coronary event without the clinical setting of myocardial ischemia  XR abdomen obstruction series   Final Result by Yakelin Burt DO (01/17 1025)   Nonobstructive bowel gas pattern  Workstation performed: [id number]         XR chest portable   Final Result by Marian Farias MD (01/10 1535)      Small bibasilar atelectasis/infiltrate  Workstation performed: [id number]         XR chest portable   Final Result by Davis Peguero MD (01/07 2031)      Endotracheal tube has been inserted with tip 5 cm above the ramos  Nasogastric tube has been inserted with tip in the stomach  Unchanged linear atelectasis in the right lower lobe, otherwise clear lungs  Workstation performed: [id number]         XR chest 1 view portable   Final Result by Sania Kendall MD (01/07 1432)      Right-sided infiltrate which may represent pneumonia  Recommend follow-up         Workstation performed: [id number]         CT abdomen pelvis wo contrast   Final Result by Sania Kendall MD (01/07 1034)      Tiny gas bubble in the urinary bladder  Correlate with urinalysis to exclude UTI  Stable appearing bladder diverticulum  Thickened appearance of the distal esophageal wall which might indicate esophagitis versus less likely neoplasm  Correlate for any pain or difficulty swallowing  Further workup with upper endoscopy or esophagram would be helpful  Colonic diverticulosis without diverticulitis  Workstation performed: [id number]         CT head without contrast   Final Result by Michelle Chavira MD (01/07 1027)      No acute intracranial abnormality  Microangiopathic changes           Workstation performed: [id number] Procedures  ECG 12 Lead Documentation  Date/Time: 1/7/2018 9:40 AM  Performed by: MYRTLE Fraire  Authorized by: MYRTLE Fraire     Rate:     ECG rate:  132    ECG rate assessment: tachycardic    Rhythm:     Rhythm: sinus tachycardia    ST segments:     ST segments:  Non-specific    CriticalCare Time  Performed by: MYRTLE Fraire  Authorized by: MYRTLE Fraire     Critical care provider statement:     Critical care time (minutes):  60    Critical care time was exclusive of:  Separately billable procedures and treating other patients    Critical care was necessary to treat or prevent imminent or life-threatening deterioration of the following conditions:  CNS failure or compromise, dehydration, metabolic crisis and renal failure    Critical care was time spent personally by me on the following activities:  Blood draw for specimens, obtaining history from patient or surrogate, development of treatment plan with patient or surrogate, discussions with consultants, evaluation of patient's response to treatment, examination of patient, interpretation of cardiac output measurements, ordering and performing treatments and interventions, ordering and review of laboratory studies, ordering and review of radiographic studies, re-evaluation of patient's condition and review of old charts  Comments:      Reviewing charts from [facility] - pt  Is NIDDM - last saw a dr  In July 2016 according to records  Phone Contacts  ED Phone Contact    ED Course  ED Course                                MDM  Number of Diagnoses or Management Options  Elevated troponin:   HHNC (hyperglycemic hyperosmolar nonketotic coma) ([facility] [address] ):    Hypokalemia:   Severe dehydration:      Amount and/or Complexity of Data Reviewed  Clinical lab tests: ordered and reviewed  Tests in the radiology section of CPT®: ordered and reviewed    Risk of Complications, Morbidity, and/or Mortality  Presenting problems: high  General comments: Pt  Admitted to ICU for further work up      Eloisa Kelly Time    Disposition  Final diagnoses: [address] [facility] [place]3XX (hyperglycemic hyperosmolar nonketotic coma) ([facility] [address] )   Hypokalemia   Severe dehydration   Elevated troponin     Time reflects when diagnosis was documented in both MDM as applicable and the Disposition within this note     Time User Action Codes Description Comment    1/7/2018 12:13 PM Alessandro Barnes R Add [E11 01] [address] [employer] [facility] (hyperglycemic hyperosmolar nonketotic coma) ([facility] [address] )     1/7/2018 12:13 PM Alessandro Barnes Add [E87 6] Hypokalemia     1/7/2018 12:13 PM Alessandro Barnes R Add [E86 0] Severe dehydration     1/7/2018 12:13 PM Vicky Barnes Add [R74 8] Elevated troponin     1/8/2018 12:44 PM Parvin Pham Add [G93 40] Encephalopathy       ED Disposition     ED Disposition Condition Comment    Admit  Case was discussed with Dr De Lugo and the patient's admission status was agreed to be stepdown/Tele      Follow-up Information     Follow up With Specialties Details Why [address] Thai, DO Geriatric Medicine Call in 1 month(s) Please call to schedule an appointment for memory testing and comprehensive geriatric assessment  [address] [facility]  [phone]          There are no discharge medications for this patient  No discharge procedures on file      ED Provider  Electronically Signed by           Ruddy Fabry, MD  01/18/18 4924

## 2018-01-07 NOTE — ED NOTES
Friend/neighbor, Maricruz, visited briefly, states she will return tomorrow       Mariaelena Montilla RN  01/07/18 8844

## 2018-01-07 NOTE — H&P
History & Physical Exam - [address] Unit 76 y o  female MRN: [id number]  Unit/Bed#: ED 09 Encounter: [id number]      Assessment/Plan:  1  Metabolic encephalopathy  ·  multifactorial likely secondary to hypokalemia / HHNK /dehydration /malnutrition  Replete potassium, recheck blood glucose and start insulin drip  2    Hypokalemia  ·  replete potassium, monitor closely  3   dehydration  ·  continue IV fluids  4  HHNK  ·  continue IV fluids, initiate insulin drip when hypokalemia has resolved  5    Acute kidney injury likely secondary to dehydration        *      Continue to monitor kidney indices, continue IV fluids  6      Acute hypoxic respiratory failure        *      Continue supplemental oxygen as needed, monitor saturation of                             peripheral oxygen  7        Leukocytosis likely secondary to urinary tract infection  *        Initiate IV antibiotics  Critical Care Time:   Documented critical care time excludes any procedures documented elsewhere  It also excludes any family updates    _____________________________________________________________________      HPI:    Jordon Macias is a 76 y o  female who was found on the floor and confused by a neighbor  Neighbor states that she checks on the patient several times a day and estimates that she was down for approximately 2 hours  EMS was activated and patient was transported to [facility] Emergency Department for further evaluation and treatment  Upon my arrival to the emergency department patient is in bed making spastic motions with her bilateral arms  She is yelling I want my glasses off, but was not wearing glasses  She is able to state her name but answers unintelligibly to any other questions  I called her  listed as her sister, who returned my call and turns out to be her  not her sister    The  did have the patient's sister's name and phone number I called and left a message there with no return call  Any information is obtained from emergency department personal     Review of Systems:  Unable to obtain as the patient is encephalopathic        Historical Information   No past medical history on file  No past surgical history on file  Social History   History   Alcohol use Not on file     History   Drug use: Unknown     History   Smoking Status    Unknown If Ever Smoked   Smokeless Tobacco    Not on file       Family History:   No family history on file  Medications:  Pertinent medications were reviewed    heparin (porcine) 5,000 Units Subcutaneous Q8H [facility]   potassium chloride 40 mEq Oral Once   potassium chloride 20 mEq Intravenous Q2H   sodium chloride 1,000 mL Intravenous Once         Allergies not on file      Vitals:   /72   Pulse (!) 114   Temp 97 7 °F (36 5 °C) (Probe)   Resp (!) 32   Wt 74 6 kg (164 lb 7 4 oz)   SpO2 90%   There is no height or weight on file to calculate BMI    SpO2: 90 %,   SpO2 Activity: At Rest,   O2 Device: Nasal cannula      Intake/Output Summary (Last 24 hours) at 01/07/18 1335  Last data filed at 01/07/18 1239   Gross per 24 hour   Intake             2000 ml   Output                0 ml   Net             2000 ml     Invasive Devices     Peripheral Intravenous Line            Peripheral IV 01/07/18 Left Antecubital less than 1 day    Peripheral IV 01/07/18 Right Antecubital less than 1 day                Physical Exam:  Gen:  Cooperative  HEENT:  Atraumatic normocephalic pupils equal round reactive to light extraocular muscles intact sclerae anicteric oral mucosa is pink and moist  Neck:  Supple no JVD no lymphadenopathy  Chest:  Respirations are tachypneic and shallow, clear to auscultation  Cor:  Regular rate and rhythm no murmurs rubs or gallops appreciated  Abd:  Soft nontender with positive bowel sounds  Ext:  No clubbing cyanosis or edema  Neuro:  Encephalopathic moves all extremities  Skin: Warm and dry no rash      Diagnostic Data:  Lab: I have personally reviewed pertinent lab results  ,   CBC:    Results from last 7 days  Lab Units 01/07/18  1000   WBC Thousand/uL 16 54*   HEMOGLOBIN g/dL 17 9*   HEMATOCRIT % 49 9*   PLATELETS Thousands/uL 590*      CMP: Lab Results   Component Value Date     (L) 01/07/2018    K 2 5 (LL) 01/07/2018    CL 75 (L) 01/07/2018    CO2 28 01/07/2018    ANIONGAP 24 (H) 01/07/2018    BUN 22 01/07/2018    CREATININE 2 26 (H) 01/07/2018    GLUCOSE 1,470 (HH) 01/07/2018    CALCIUM 10 5 (H) 01/07/2018    AST 13 01/07/2018    ALT 15 01/07/2018    ALKPHOS 187 (H) 01/07/2018    PROT 10 1 (H) 01/07/2018    ALBUMIN 3 4 (L) 01/07/2018    BILITOT 0 76 01/07/2018    EGFR 20 01/07/2018   ,   PT/INR:   Lab Results   Component Value Date    INR 1 22 (H) 01/07/2018   ,   Magnesium: No results found for: MAG,  Phosphorous: No results found for: PHOS    ABG: No results found for: PHART, NWQ4TTA, PO2ART, SSM2AUW, K9RJURET, BEART, SOURCE,     Microbiology:  Blood in urine cultures are pending    Imaging: I have personally reviewed the pertinent imaging studies on the PACS system  Chest x-ray demonstrates no active pulmonary disease    Cardiac/EKG/telemetry/Echo:       Normal sinus rhythm      VTE Prophylaxis: Sequential compression device Zhanna Mayank)     Code Status: Level 1 - Full Code    STORM De    Portions of the record may have been created with voice recognition software  Occasional wrong word or "sound a like" substitutions may have occurred due to the inherent limitations of voice recognition software  Read the chart carefully and recognize, using context, where substitutions have occurred

## 2018-01-07 NOTE — ED NOTES
Patient poor historian of medical/surgical history and meds    Patient unable to answer these questions     Carmine Lazaro RN  01/07/18 3085

## 2018-01-08 ENCOUNTER — GENERIC CONVERSION - ENCOUNTER (OUTPATIENT)
Dept: OTHER | Facility: OTHER | Age: 78
End: 2018-01-08

## 2018-01-08 LAB
ANION GAP SERPL CALCULATED.3IONS-SCNC: 10 MMOL/L (ref 4–13)
ANION GAP SERPL CALCULATED.3IONS-SCNC: 12 MMOL/L (ref 4–13)
ANION GAP SERPL CALCULATED.3IONS-SCNC: 7 MMOL/L (ref 4–13)
ANION GAP SERPL CALCULATED.3IONS-SCNC: 9 MMOL/L (ref 4–13)
ATRIAL RATE: 132 BPM
BUN SERPL-MCNC: 23 MG/DL (ref 5–25)
BUN SERPL-MCNC: 25 MG/DL (ref 5–25)
BUN SERPL-MCNC: 26 MG/DL (ref 5–25)
BUN SERPL-MCNC: 26 MG/DL (ref 5–25)
CALCIUM SERPL-MCNC: 8.3 MG/DL (ref 8.3–10.1)
CALCIUM SERPL-MCNC: 8.7 MG/DL (ref 8.3–10.1)
CALCIUM SERPL-MCNC: 8.9 MG/DL (ref 8.3–10.1)
CALCIUM SERPL-MCNC: 9.2 MG/DL (ref 8.3–10.1)
CHLORIDE SERPL-SCNC: 108 MMOL/L (ref 100–108)
CHLORIDE SERPL-SCNC: 110 MMOL/L (ref 100–108)
CHLORIDE SERPL-SCNC: 111 MMOL/L (ref 100–108)
CHLORIDE SERPL-SCNC: 112 MMOL/L (ref 100–108)
CO2 SERPL-SCNC: 26 MMOL/L (ref 21–32)
CO2 SERPL-SCNC: 29 MMOL/L (ref 21–32)
CO2 SERPL-SCNC: 29 MMOL/L (ref 21–32)
CO2 SERPL-SCNC: 32 MMOL/L (ref 21–32)
CREAT SERPL-MCNC: 1.36 MG/DL (ref 0.6–1.3)
CREAT SERPL-MCNC: 1.51 MG/DL (ref 0.6–1.3)
CREAT SERPL-MCNC: 1.74 MG/DL (ref 0.6–1.3)
CREAT SERPL-MCNC: 1.8 MG/DL (ref 0.6–1.3)
GFR SERPL CREATININE-BSD FRML MDRD: 26 ML/MIN/1.73SQ M
GFR SERPL CREATININE-BSD FRML MDRD: 27 ML/MIN/1.73SQ M
GFR SERPL CREATININE-BSD FRML MDRD: 33 ML/MIN/1.73SQ M
GFR SERPL CREATININE-BSD FRML MDRD: 37 ML/MIN/1.73SQ M
GLUCOSE SERPL-MCNC: 100 MG/DL (ref 65–140)
GLUCOSE SERPL-MCNC: 150 MG/DL (ref 65–140)
GLUCOSE SERPL-MCNC: 157 MG/DL (ref 65–140)
GLUCOSE SERPL-MCNC: 167 MG/DL (ref 65–140)
GLUCOSE SERPL-MCNC: 173 MG/DL (ref 65–140)
GLUCOSE SERPL-MCNC: 175 MG/DL (ref 65–140)
GLUCOSE SERPL-MCNC: 176 MG/DL (ref 65–140)
GLUCOSE SERPL-MCNC: 197 MG/DL (ref 65–140)
GLUCOSE SERPL-MCNC: 219 MG/DL (ref 65–140)
GLUCOSE SERPL-MCNC: 224 MG/DL (ref 65–140)
GLUCOSE SERPL-MCNC: 257 MG/DL (ref 65–140)
GLUCOSE SERPL-MCNC: 267 MG/DL (ref 65–140)
GLUCOSE SERPL-MCNC: 282 MG/DL (ref 65–140)
GLUCOSE SERPL-MCNC: 348 MG/DL (ref 65–140)
GLUCOSE SERPL-MCNC: 349 MG/DL (ref 65–140)
GLUCOSE SERPL-MCNC: 365 MG/DL (ref 65–140)
GLUCOSE SERPL-MCNC: 388 MG/DL (ref 65–140)
GLUCOSE SERPL-MCNC: 83 MG/DL (ref 65–140)
LACTATE SERPL-SCNC: 2.2 MMOL/L (ref 0.5–2)
MAGNESIUM SERPL-MCNC: 2.1 MG/DL (ref 1.6–2.6)
MAGNESIUM SERPL-MCNC: 2.2 MG/DL (ref 1.6–2.6)
MAGNESIUM SERPL-MCNC: 2.3 MG/DL (ref 1.6–2.6)
MAGNESIUM SERPL-MCNC: 2.3 MG/DL (ref 1.6–2.6)
P AXIS: 73 DEGREES
PHOSPHATE SERPL-MCNC: 0.7 MG/DL (ref 2.3–4.1)
PHOSPHATE SERPL-MCNC: 0.7 MG/DL (ref 2.3–4.1)
PHOSPHATE SERPL-MCNC: 0.8 MG/DL (ref 2.3–4.1)
PHOSPHATE SERPL-MCNC: 1.1 MG/DL (ref 2.3–4.1)
POTASSIUM SERPL-SCNC: 3.3 MMOL/L (ref 3.5–5.3)
POTASSIUM SERPL-SCNC: 3.4 MMOL/L (ref 3.5–5.3)
POTASSIUM SERPL-SCNC: 3.7 MMOL/L (ref 3.5–5.3)
POTASSIUM SERPL-SCNC: 3.7 MMOL/L (ref 3.5–5.3)
PR INTERVAL: 190 MS
QRS AXIS: 64 DEGREES
QRSD INTERVAL: 100 MS
QT INTERVAL: 300 MS
QTC INTERVAL: 444 MS
SODIUM SERPL-SCNC: 147 MMOL/L (ref 136–145)
SODIUM SERPL-SCNC: 148 MMOL/L (ref 136–145)
SODIUM SERPL-SCNC: 149 MMOL/L (ref 136–145)
SODIUM SERPL-SCNC: 151 MMOL/L (ref 136–145)
T WAVE AXIS: 155 DEGREES
TROPONIN I SERPL-MCNC: 2.57 NG/ML
TROPONIN I SERPL-MCNC: 2.8 NG/ML
TROPONIN I SERPL-MCNC: 2.99 NG/ML
VENTRICULAR RATE: 132 BPM

## 2018-01-08 PROCEDURE — 84100 ASSAY OF PHOSPHORUS: CPT | Performed by: NURSE PRACTITIONER

## 2018-01-08 PROCEDURE — 83605 ASSAY OF LACTIC ACID: CPT | Performed by: NURSE PRACTITIONER

## 2018-01-08 PROCEDURE — 80048 BASIC METABOLIC PNL TOTAL CA: CPT | Performed by: NURSE PRACTITIONER

## 2018-01-08 PROCEDURE — 94003 VENT MGMT INPAT SUBQ DAY: CPT

## 2018-01-08 PROCEDURE — 84484 ASSAY OF TROPONIN QUANT: CPT | Performed by: NURSE PRACTITIONER

## 2018-01-08 PROCEDURE — 83735 ASSAY OF MAGNESIUM: CPT | Performed by: NURSE PRACTITIONER

## 2018-01-08 PROCEDURE — 82948 REAGENT STRIP/BLOOD GLUCOSE: CPT

## 2018-01-08 RX ORDER — DOCUSATE SODIUM 100 MG/1
100 CAPSULE, LIQUID FILLED ORAL 2 TIMES DAILY
Status: DISCONTINUED | OUTPATIENT
Start: 2018-01-08 | End: 2018-01-09

## 2018-01-08 RX ORDER — ASPIRIN 81 MG/1
81 TABLET, CHEWABLE ORAL DAILY
Status: DISCONTINUED | OUTPATIENT
Start: 2018-01-09 | End: 2018-01-24 | Stop reason: HOSPADM

## 2018-01-08 RX ORDER — SENNOSIDES 8.6 MG
1 TABLET ORAL
Status: DISCONTINUED | OUTPATIENT
Start: 2018-01-08 | End: 2018-01-09

## 2018-01-08 RX ORDER — POTASSIUM CHLORIDE 20MEQ/15ML
40 LIQUID (ML) ORAL ONCE
Status: COMPLETED | OUTPATIENT
Start: 2018-01-08 | End: 2018-01-08

## 2018-01-08 RX ADMIN — LEVETIRACETAM 500 MG: 100 INJECTION, SOLUTION INTRAVENOUS at 20:19

## 2018-01-08 RX ADMIN — PROPOFOL 5 MCG/KG/MIN: 10 INJECTION, EMULSION INTRAVENOUS at 00:54

## 2018-01-08 RX ADMIN — HEPARIN SODIUM 5000 UNITS: 5000 INJECTION, SOLUTION INTRAVENOUS; SUBCUTANEOUS at 14:56

## 2018-01-08 RX ADMIN — DEXTROSE AND SODIUM CHLORIDE 125 ML/HR: 5; 450 INJECTION, SOLUTION INTRAVENOUS at 10:28

## 2018-01-08 RX ADMIN — SODIUM CHLORIDE, SODIUM LACTATE, POTASSIUM CHLORIDE, AND CALCIUM CHLORIDE 500 ML: .6; .31; .03; .02 INJECTION, SOLUTION INTRAVENOUS at 15:20

## 2018-01-08 RX ADMIN — DEXTROSE AND SODIUM CHLORIDE 125 ML/HR: 5; 450 INJECTION, SOLUTION INTRAVENOUS at 02:12

## 2018-01-08 RX ADMIN — PROPOFOL 40 MCG/KG/MIN: 10 INJECTION, EMULSION INTRAVENOUS at 08:29

## 2018-01-08 RX ADMIN — HEPARIN SODIUM 5000 UNITS: 5000 INJECTION, SOLUTION INTRAVENOUS; SUBCUTANEOUS at 06:04

## 2018-01-08 RX ADMIN — FOLIC ACID 1 MG: 5 INJECTION, SOLUTION INTRAMUSCULAR; INTRAVENOUS; SUBCUTANEOUS at 13:03

## 2018-01-08 RX ADMIN — PROPOFOL 30 MCG/KG/MIN: 10 INJECTION, EMULSION INTRAVENOUS at 15:47

## 2018-01-08 RX ADMIN — CEFEPIME HYDROCHLORIDE 1000 MG: 1 INJECTION, SOLUTION INTRAVENOUS at 15:05

## 2018-01-08 RX ADMIN — SODIUM CHLORIDE 5 UNITS/HR: 9 INJECTION, SOLUTION INTRAVENOUS at 18:01

## 2018-01-08 RX ADMIN — PROPOFOL 30 MCG/KG/MIN: 10 INJECTION, EMULSION INTRAVENOUS at 22:09

## 2018-01-08 RX ADMIN — Medication 20 MEQ: at 01:59

## 2018-01-08 RX ADMIN — POTASSIUM PHOSPHATE, MONOBASIC AND POTASSIUM PHOSPHATE, DIBASIC 30 MMOL: 224; 236 INJECTION, SOLUTION INTRAVENOUS at 14:54

## 2018-01-08 RX ADMIN — THIAMINE HYDROCHLORIDE 50 MG: 100 INJECTION, SOLUTION INTRAMUSCULAR; INTRAVENOUS at 13:03

## 2018-01-08 RX ADMIN — CEFEPIME HYDROCHLORIDE 1000 MG: 1 INJECTION, SOLUTION INTRAVENOUS at 01:59

## 2018-01-08 RX ADMIN — HEPARIN SODIUM 5000 UNITS: 5000 INJECTION, SOLUTION INTRAVENOUS; SUBCUTANEOUS at 21:13

## 2018-01-08 RX ADMIN — SENNOSIDES 8.6 MG: 8.6 TABLET, FILM COATED ORAL at 21:12

## 2018-01-08 RX ADMIN — POTASSIUM CHLORIDE 40 MEQ: 20 SOLUTION ORAL at 01:51

## 2018-01-08 RX ADMIN — LEVETIRACETAM 500 MG: 100 INJECTION, SOLUTION INTRAVENOUS at 09:03

## 2018-01-08 RX ADMIN — DEXTROSE AND SODIUM CHLORIDE 125 ML/HR: 5; 450 INJECTION, SOLUTION INTRAVENOUS at 18:52

## 2018-01-08 NOTE — PROCEDURES
Intubation  Date/Time: 1/7/2018 7:20 PM  Performed by: Haja Sanchez by: Hira Askew     Patient location:  Bedside  Other Assisting Provider: No    Consent:     Consent obtained:  Emergent situation  Universal protocol:     Patient identity confirmed:  Arm band and hospital-assigned identification number  Pre-procedure details:     Patient status:  Unresponsive    Mallampati score:  1    Pretreatment medications:  Etomidate    Paralytics:  None    Sedation type (ED):  Moderate (conscious) sedation (See separate ED Procedural Sedation form)  Indications:     Indications for intubation: airway protection    Procedure details:     Preoxygenation:  Bag valve mask    CPR in progress: no      Intubation method:  Oral    Oral intubation technique:  Direct    Tube size (mm):  8 0    Tube type:  Cuffed    Number of attempts:  1    Ventilation between attempts: no      Cricoid pressure: no      Tube visualized through cords: yes    Placement assessment:     Tube secured with:  ETT munoz    Breath sounds:  Equal    Placement verification: CXR verification, equal breath sounds and ETCO2 detector      CXR findings:  ETT in proper place  Post-procedure details:     Patient tolerance of procedure:   Tolerated well, no immediate complications

## 2018-01-08 NOTE — CASE MANAGEMENT
Initial Clinical Review    Admission: Date/Time/Statement: 18 @ 1215     Orders Placed This Encounter   Procedures    Inpatient Admission (expected length of stay for this patient is greater than two midnights)     Standing Status:   Standing     Number of Occurrences:   1     Order Specific Question:   Admitting Physician     Answer:   Kenneth Soto [[id number]]     Order Specific Question:   Level of Care     Answer:   Level 1 Stepdown [13]     Order Specific Question:   Estimated length of stay     Answer:   More than 2 Midnights     Order Specific Question:   Certification     Answer:   I certify that inpatient services are medically necessary for this patient for a duration of greater than two midnights  See H&P and MD Progress Notes for additional information about the patient's course of treatment  ED: Date/Time/Mode of Arrival:   ED Arrival Information     Expected Arrival Acuity Means of Arrival Escorted By Service Admission Type    - 2018 09:07 Urgent Ambulance [facility] [facility] Critical Care/ICU Urgent    Arrival Complaint    Weakness          Chief Complaint:   Chief Complaint   Patient presents with    Failure To Thrive     Patient lives alone and neighbor checks on patient a few times a day  Patient found this am on her floor up against her bed lying on her L side  Aproximate down time was 2 hrs  Patient is reported to be taking meds but no meds were found in the house  Food in patient s refridgerator has been  for 2 months  Patient stating "she doesnt want to be here any more" to EMS  Patient incontinent in soiled brief       History of Illness:   Archie Coelho is a 76 y o  female who was found on the floor and confused by a neighbor  Neighbor states that she checks on the patient several times a day and estimates that she was down for approximately 2 hours    EMS was activated and patient was transported to [place] [address] Emergency Department for further evaluation and treatment      Upon my arrival to the emergency department patient is in bed making spastic motions with her bilateral arms  She is yelling I want my glasses off, but was not wearing glasses  She is able to state her name but answers unintelligibly to any other questions      I called her  listed as her sister, who returned my call and turns out to be her  not her sister  The  did have the patient's sister's name and phone number I called and left a message there with no return call  Any information is obtained from emergency department personal     ED Vital Signs:   ED Triage Vitals   Temperature Pulse Respirations Blood Pressure SpO2   01/07/18 0921 01/07/18 0921 01/07/18 0921 01/07/18 0921 01/07/18 0924   (!) 96 8 °F (36 °C) (!) 133 (!) 36 130/61 100 %      Temp Source Heart Rate Source Patient Position - Orthostatic VS BP Location FiO2 (%)   01/07/18 0921 01/07/18 0921 01/07/18 0921 01/07/18 0921 --   Temporal Monitor Lying Right arm       Pain Score       01/07/18 1305       No Pain        Wt Readings from Last 1 Encounters:   01/07/18 73 4 kg (161 lb 13 1 oz)       Vital Signs (abnormal):    above    Abnormal Labs/Diagnostic Test Results:    Lactic acid     12 6  Serum osmolality    376  WBC     16 54  H/H   17 9/49 9  ABG     PCO2     67     PO2     33 6      HCO3    33 2  Platelets  510  NA  147  K  2 5  Chloride   75  AG  24  Creat   2 26  Alk phos   187  Albumin   3 4  Ammonia  50  CXR:      Right-sided infiltrate which may represent pneumonia   Recommend follow-up  Ct abd/pelvis:    Tiny gas bubble in the urinary bladder   Correlate with urinalysis to exclude UTI   Stable appearing bladder diverticulum  Thickened appearance of the distal esophageal wall which might indicate esophagitis versus less likely neoplasm   Correlate for any pain or difficulty swallowing   Further workup with upper endoscopy or esophagram would be helpful      Colonic diverticulosis without diverticulitis  Ct head:     No acute intracranial abnormality   Microangiopathic changes          ED Treatment:   Medication Administration from 01/07/2018 0907 to 01/07/2018 1533       Date/Time Order Dose Route Action Action by Comments     01/07/2018 1115 sodium chloride 0 9 % bolus 1,000 mL 0 mL Intravenous Stopped [facility] Angel, RN      01/07/2018 0959 sodium chloride 0 9 % bolus 1,000 mL 1,000 mL Intravenous New Bag [facility] Angel, RN      01/07/2018 1006 ondansetron (ZOFRAN) injection 4 mg 4 mg Intravenous Given [facility] Angel, JESUS      01/07/2018 1239 sodium chloride 0 9 % bolus 1,000 mL 0 mL Intravenous Stopped Frida L Angel, RN      01/07/2018 1048 sodium chloride 0 9 % bolus 1,000 mL 1,000 mL [facility]ous [facility]      01/07/2018 1115 famotidine (PEPCID) injection 20 mg 20 mg Intravenous Given [facility] Angel, JESUS      01/07/2018 1430 potassium chloride 20 mEq in D5W 100 mL 20 mEq Intravenous Not Given Yair Tate, RN      01/07/2018 1124 potassium chloride 20 mEq in D5W 100 mL 20 mEq Intravenous Given [facility] Angel, RN      01/07/2018 1202 ondansetron (ZOFRAN) injection 4 mg 4 mg Intravenous Given Frida L Angel, RN      01/07/2018 1425 sodium chloride 0 9 % bolus 1,000 mL 0 mL Intravenous Stopped Frida L Angel, RN      01/07/2018 1238 sodium chloride 0 9 % bolus 1,000 mL 1,000 mL Intravenous New Bag [facility] Angel, RN      01/07/2018 1421 potassium chloride (K-DUR,KLOR-CON) CR tablet 40 mEq 40 mEq Oral Given Frida L JESUS Ortiz nausea     01/07/2018 1414 heparin (porcine) subcutaneous injection 5,000 Units 5,000 Units Subcutaneous Given [facility] Angel, JESUS      01/07/2018 1513 sodium chloride 0 9 % with KCl 40 mEq/L infusion (premix) 100 mL/hr Intravenous [address] Stella Ortiz RN      01/07/2018 1502 cefepime (MAXIPIME) IVPB (premix) 1,000 mg 1,000 mg Intravenous New Bag Yair Tate RN           Past Medical/Surgical History: Active Ambulatory Problems     Diagnosis Date Noted    No Active Ambulatory Problems     Resolved Ambulatory Problems     Diagnosis Date Noted    No Resolved Ambulatory Problems     Past Medical History:   Diagnosis Date    Arthritis     Diabetes mellitus ([address] )     Hypertension     Memory loss        Admitting Diagnosis: Hypokalemia [E87 6]  Weakness [R53 1]  Elevated troponin [R74 8]  Severe dehydration [E86 0]  HHNC (hyperglycemic hyperosmolar nonketotic coma) (HCC) [E11 01]    Age/Sex: 76 y o  female    Assessment/Plan:    Assessment/Plan:  1  Metabolic encephalopathy  ·  multifactorial likely secondary to hypokalemia / HHNK /dehydration /malnutrition  Replete potassium, recheck blood glucose and start insulin drip  2    Hypokalemia  ·  replete potassium, monitor closely  3   dehydration  ·  continue IV fluids  4  HHNK  ·  continue IV fluids, initiate insulin drip when hypokalemia has resolved  5    Acute kidney injury likely secondary to dehydration        *      Continue to monitor kidney indices, continue IV fluids  6      Acute hypoxic respiratory failure        *      Continue supplemental oxygen as needed, monitor saturation of                             peripheral oxygen  7        Leukocytosis likely secondary to urinary tract infection  *        Initiate IV antibiotics       Admission Orders:   IP    1/7  @    1215  Scheduled Meds:   cefepime 1,000 mg Intravenous C51K   folic acid 1 mg Intravenous Daily   heparin (porcine) 5,000 Units Subcutaneous Q8H [facility]   levETIRAcetam 500 mg Intravenous Q12H [facility]   potassium phosphate 30 mmol Intravenous Once   thiamine 50 mg Intravenous Daily     Continuous Infusions:   dexmedetomidine 0 1-0 7 mcg/kg/hr    dextrose 5 % and sodium chloride 0 45 % 125 mL/hr Last Rate: 125 mL/hr (01/08/18 0212)   dextrose 125 mL/hr Last Rate: Stopped (01/07/18 2232)   insulin regular (HumuLIN R,NovoLIN R) infusion 0 3-21 Units/hr Last Rate: 19 Units/hr (01/08/18 0826)   propofol 5-50 mcg/kg/min Last Rate: 40 mcg/kg/min (01/08/18 0829)     PRN Meds: haloperidol lactate     PROGRESS  NOTE  1/8  1  Acute hypoxic respiratory failure likely multifactorial related to metabolic encephalopathy, metabolic derangements, and possible urinary tract infection  · Will continue with vent support for now with a weaning trial when the patient's mental status improves  2  Hyperosmolar nonketotic hyperglycemia  · We will continue the patient on D5 half normal saline for now to slowly return her glucose to a normal level  · We will continue her on a continuous insulin infusion  3  Hypokalemia-improved  · We will continue checking her electrolytes with a goal to maintain her potassium level greater than 4  4  Acute metabolic encephalopathy, this is likely multifactorial related to #1 and # 2   · Currently she is sedated on propofol  however she is easily arousable  · Once her electrolyte abnormalities are fully corrected we will attempt to wean and liberate her from the ventilator  5  Bilateral tonic/colonic movement disorder, possibly seizures versus chorea hyperglycemia basal ganglia syndrome (C H- BG), versus hypoxia mediated, versus rapid cerebral fluid shifts  ? The patient was noted to have persistent mostly upper extremity movement that was treated with Valium without significant improvement  It resolved after the patient was intubated  ? Chorea hyperglycemia basal ganglia syndrome can be seen in patients with hyperosmolar hyperglycemic nonketotic diabetic conditions particularly an elderly female patients  It is characterized by by irregular poorly patterned involuntary movements  The Hallmark of treatment is correction of the patient's blood glucose slowly  We will consider an MRI to verify  ? The patient was started on Keppra which we will continue for now  We will obtain an EEG to rule out an underlying seizure disorder  6    Acute kidney injury likely multifactorial related to dehydration in the setting of HHNK-slowly improving  7  Urinary tract infection with gram-negative bacteremia  ? We will continue her on cefepime for now pending her culture results and sensitivities  8   Lactic acidosis which is likely multifactorial related to dehydration in the setting of HHNK, possibly related to #7    Thank you,  [address] [facility] in the [facility] [place] by Vel Dempsey for 2017  Network Utilization Review Department  Phone: [phone]; Fax [phone]  ATTENTION: The Network Utilization Review Department is now centralized for our 7 Facilities  Make a note that we have a new phone and fax numbers for our Department  Please call with any questions or concerns to [phone] and carefully follow the prompts so that you are directed to the right person  All voicemails are confidential  Fax any determinations, approvals, denials, and requests for initial or continue stay review clinical to [phone]  Due to HIGH CALL volume, it would be easier if you could please send faxed requests to expedite your requests and in part, help us provide discharge notifications faster

## 2018-01-08 NOTE — PROGRESS NOTES
Progress Note - Critical Care   Marianna Kevin 76 y o  female MRN: [id number]  Unit/Bed#: ICU 04 Encounter: [id number]    Assessment/Plan:  1  Acute hypoxic respiratory failure likely multifactorial related to metabolic encephalopathy, metabolic derangements, and possible urinary tract infection  · Will continue with vent support for now with a weaning trial when the patient's mental status improves  2  Hyperosmolar nonketotic hyperglycemia  · We will continue the patient on D5 half normal saline for now to slowly return her glucose to a normal level  · We will continue her on a continuous insulin infusion  3  Hypokalemia-improved  · We will continue checking her electrolytes with a goal to maintain her potassium level greater than 4  4  Acute metabolic encephalopathy, this is likely multifactorial related to #1 and # 2   · Currently she is sedated on propofol  however she is easily arousable  · Once her electrolyte abnormalities are fully corrected we will attempt to wean and liberate her from the ventilator  5  Bilateral tonic/colonic movement disorder, possibly seizures versus chorea hyperglycemia basal ganglia syndrome (C H- BG), versus hypoxia mediated, versus rapid cerebral fluid shifts  · The patient was noted to have persistent mostly upper extremity movement that was treated with Valium without significant improvement  It resolved after the patient was intubated  · Chorea hyperglycemia basal ganglia syndrome can be seen in patients with hyperosmolar hyperglycemic nonketotic diabetic conditions particularly an elderly female patients  It is characterized by by irregular poorly patterned involuntary movements  The Hallmark of treatment is correction of the patient's blood glucose slowly  We will consider an MRI to verify  · The patient was started on Keppra which we will continue for now  We will obtain an EEG to rule out an underlying seizure disorder  6    Acute kidney injury likely multifactorial related to dehydration in the setting of HHNK-slowly improving  7  Urinary tract infection with gram-negative bacteremia  · We will continue her on cefepime for now pending her culture results and sensitivities  8   Lactic acidosis which is likely multifactorial related to dehydration in the setting of HHNK, possibly related to #7  · If her lactic acid remains elevated this morning she will need a CT scan of the abdomen to rule out ischemia    Critical Care Time:   Documented critical care time excludes any procedures documented elsewhere  It also excludes any family updates    _____________________________________________________________________    HPI/24hr events:   Events of admission are noted  The patient required intubation for tonic-clonic movement of her upper extremities with associated tachycardia and intermittent hypoxia    Medications:    cefepime 1,000 mg Intravenous K91M   folic acid 1 mg Intravenous Daily   heparin (porcine) 5,000 Units Subcutaneous Q8H [address]   levETIRAcetam 500 mg Intravenous Q12H [address]   potassium phosphate 30 mmol Intravenous Once   propofol      thiamine 50 mg Intravenous Daily         dexmedetomidine 0 1-0 7 mcg/kg/hr    dextrose 5 % and sodium chloride 0 45 % 125 mL/hr Last Rate: 125 mL/hr (01/08/18 0212)   dextrose 125 mL/hr Last Rate: Stopped (01/07/18 2232)   insulin regular (HumuLIN R,NovoLIN R) infusion 0 3-21 Units/hr Last Rate: Stopped (01/08/18 0130)   propofol 5-50 mcg/kg/min Last Rate: 25 mcg/kg/min (01/08/18 0343)         Physical exam:  Vitals: Body mass index is 26 12 kg/m²  Blood pressure 130/68, pulse (!) 108, temperature 99 3 °F (37 4 °C), resp  rate 15, height 5' 6" (1 676 m), weight 73 4 kg (161 lb 13 1 oz), SpO2 95 %  ,  Temp  Min: 96 8 °F (36 °C)  Max: 102 6 °F (39 2 °C)  IBW: 59 3 kg    SpO2: 95 %  SpO2 Activity: At Rest  O2 Device: Nasal cannula      Intake/Output Summary (Last 24 hours) at 01/08/18 0504  Last data filed at 01/08/18 0400   Gross per 24 hour   Intake          4700 37 ml   Output             1928 ml   Net          2772 37 ml       Invasive/non-invasive ventilation settings:   Respiratory    Lab Data (Last 4 hours)    None         O2/Vent Data (Last 4 hours)      01/08 0247           Vent Mode AC/VC       Resp Rate (BPM) (BPM) 14       Vt (mL) (mL) 450       FIO2 (%) (%) 50       PEEP (cmH2O) (cmH2O) 5       MV 7 6                 Invasive Devices     Peripheral Intravenous Line            Peripheral IV 01/07/18 Left Antecubital less than 1 day    Peripheral IV 01/07/18 Left Wrist less than 1 day    Peripheral IV 01/07/18 Right Antecubital less than 1 day    Peripheral IV 01/07/18 Right Wrist less than 1 day          Arterial Line            Arterial Line 01/07/18 Left Radial less than 1 day          Drain            NG/OG/Enteral Tube Orogastric Right mouth less than 1 day    Urethral Catheter Temperature probe less than 1 day          Airway            ETT  Hi-Lo; Cuffed; Inflated 8 mm less than 1 day                  Physical Exam:  Gen:  Sedated but arousable  HEENT:  Pupils are equal round reactive to light  Neck:  Supple negative for lymphadenopathy  Chest:  Diminished but otherwise clear  Cor:  Regular rate and rhythm  Abd:  Mildly distended with generalized tenderness  Ext:  There is mild lower extremity edema  Neuro:  Sedated but arousable  Skin:  Warm and dry      Diagnostic Data:  Lab: I have personally reviewed pertinent lab results         CMP:     Results from last 7 days  Lab Units 01/08/18  0156 01/07/18  2114 01/07/18  1856  01/07/18  1000   SODIUM mmol/L 151* 150* 149*  < > 127*   POTASSIUM mmol/L 3 3* 3 2* 4 0  < > 2 5*   CHLORIDE mmol/L 110* 109* 103  < > 75*   CO2 mmol/L 32 27 30  < > 28   BUN mg/dL 23 23 22  < > 22   CREATININE mg/dL 1 36* 1 75* 1 96*  < > 2 26*   CALCIUM mg/dL 9 2 8 9 9 3  < > 10 5*   TOTAL PROTEIN g/dL  --  7 1 7 9  --  10 1*   BILIRUBIN TOTAL mg/dL  --  0 56 0 51  --  0 76   ALK PHOS U/L  --  128* 148*  -- 187*   ALT U/L  --  12 14  --  15   AST U/L  --  23 20  --  13   GLUCOSE RANDOM mg/dL 197* 394* 681*  < > 1,470*   < > = values in this interval not displayed  PT/INR:   Lab Results   Component Value Date    INR 1 22 (H) 01/07/2018   ,   Magnesium:   Results from last 7 days  Lab Units 01/08/18  0156 01/07/18  2114   MAGNESIUM mg/dL 2 3 2 4     Phosphorous:   Results from last 7 days  Lab Units 01/08/18  0156   PHOSPHORUS mg/dL 1 1*       Microbiology:    Results from last 7 days  Lab Units 01/07/18  1048   GRAM STAIN RESULT  Gram negative rods       Imaging:      Cardiac lab/EKG/telemetry/ECHO:       VTE Prophylaxis:  Heparin    Code Status: Level 1 - Full Code    STORM Nino    Portions of the record may have been created with voice recognition software  Occasional wrong word or "sound a like" substitutions may have occurred due to the inherent limitations of voice recognition software  Read the chart carefully and recognize, using context, where substitutions have occurred

## 2018-01-08 NOTE — PROGRESS NOTES
Pt currently on regular diet, adjusted to NPO as pt is intubated  If unable for extubation and plans to initiate TF can consider d/cing D5, if continuing w/ current propofol rec Jevity 1 2 @ 20ml/hr advance as tolerated to goal of 50ml/hr up to 1000ml, add 1 packet of prosource, H20 flushes 150ml q 4 hrs

## 2018-01-09 ENCOUNTER — APPOINTMENT (INPATIENT)
Dept: NON INVASIVE DIAGNOSTICS | Facility: HOSPITAL | Age: 78
DRG: 871 | End: 2018-01-09
Payer: COMMERCIAL

## 2018-01-09 LAB
ANION GAP SERPL CALCULATED.3IONS-SCNC: 7 MMOL/L (ref 4–13)
BACTERIA UR CULT: ABNORMAL
BUN SERPL-MCNC: 32 MG/DL (ref 5–25)
CALCIUM SERPL-MCNC: 8.1 MG/DL (ref 8.3–10.1)
CHLORIDE SERPL-SCNC: 109 MMOL/L (ref 100–108)
CO2 SERPL-SCNC: 29 MMOL/L (ref 21–32)
CREAT SERPL-MCNC: 2.07 MG/DL (ref 0.6–1.3)
ERYTHROCYTE [DISTWIDTH] IN BLOOD BY AUTOMATED COUNT: 14.5 % (ref 11.6–15.1)
GFR SERPL CREATININE-BSD FRML MDRD: 22 ML/MIN/1.73SQ M
GLUCOSE SERPL-MCNC: 148 MG/DL (ref 65–140)
GLUCOSE SERPL-MCNC: 169 MG/DL (ref 65–140)
GLUCOSE SERPL-MCNC: 178 MG/DL (ref 65–140)
GLUCOSE SERPL-MCNC: 188 MG/DL (ref 65–140)
GLUCOSE SERPL-MCNC: 191 MG/DL (ref 65–140)
GLUCOSE SERPL-MCNC: 197 MG/DL (ref 65–140)
GLUCOSE SERPL-MCNC: 200 MG/DL (ref 65–140)
GLUCOSE SERPL-MCNC: 208 MG/DL (ref 65–140)
GLUCOSE SERPL-MCNC: 211 MG/DL (ref 65–140)
GLUCOSE SERPL-MCNC: 211 MG/DL (ref 65–140)
GLUCOSE SERPL-MCNC: 216 MG/DL (ref 65–140)
GLUCOSE SERPL-MCNC: 224 MG/DL (ref 65–140)
GLUCOSE SERPL-MCNC: 238 MG/DL (ref 65–140)
GLUCOSE SERPL-MCNC: 288 MG/DL (ref 65–140)
HCT VFR BLD AUTO: 36.2 % (ref 34.8–46.1)
HGB BLD-MCNC: 11.6 G/DL (ref 11.5–15.4)
MCH RBC QN AUTO: 28.6 PG (ref 26.8–34.3)
MCHC RBC AUTO-ENTMCNC: 32 G/DL (ref 31.4–37.4)
MCV RBC AUTO: 89 FL (ref 82–98)
PHOSPHATE SERPL-MCNC: 2.1 MG/DL (ref 2.3–4.1)
PLATELET # BLD AUTO: 223 THOUSANDS/UL (ref 149–390)
PMV BLD AUTO: 11.9 FL (ref 8.9–12.7)
POTASSIUM SERPL-SCNC: 3.9 MMOL/L (ref 3.5–5.3)
RBC # BLD AUTO: 4.06 MILLION/UL (ref 3.81–5.12)
SODIUM SERPL-SCNC: 145 MMOL/L (ref 136–145)
WBC # BLD AUTO: 7.87 THOUSAND/UL (ref 4.31–10.16)

## 2018-01-09 PROCEDURE — 93306 TTE W/DOPPLER COMPLETE: CPT

## 2018-01-09 PROCEDURE — 84100 ASSAY OF PHOSPHORUS: CPT | Performed by: NURSE PRACTITIONER

## 2018-01-09 PROCEDURE — 85027 COMPLETE CBC AUTOMATED: CPT | Performed by: NURSE PRACTITIONER

## 2018-01-09 PROCEDURE — 82948 REAGENT STRIP/BLOOD GLUCOSE: CPT

## 2018-01-09 PROCEDURE — 94003 VENT MGMT INPAT SUBQ DAY: CPT

## 2018-01-09 PROCEDURE — 80048 BASIC METABOLIC PNL TOTAL CA: CPT | Performed by: NURSE PRACTITIONER

## 2018-01-09 PROCEDURE — 87040 BLOOD CULTURE FOR BACTERIA: CPT | Performed by: NURSE PRACTITIONER

## 2018-01-09 RX ORDER — CHLORHEXIDINE GLUCONATE 0.12 MG/ML
15 RINSE ORAL EVERY 12 HOURS SCHEDULED
Status: DISCONTINUED | OUTPATIENT
Start: 2018-01-09 | End: 2018-01-10

## 2018-01-09 RX ORDER — AMOXICILLIN 250 MG
1 CAPSULE ORAL
Status: DISCONTINUED | OUTPATIENT
Start: 2018-01-09 | End: 2018-01-24 | Stop reason: HOSPADM

## 2018-01-09 RX ORDER — SODIUM CHLORIDE 9 MG/ML
50 INJECTION, SOLUTION INTRAVENOUS CONTINUOUS
Status: DISCONTINUED | OUTPATIENT
Start: 2018-01-09 | End: 2018-01-11

## 2018-01-09 RX ADMIN — ASPIRIN 81 MG 81 MG: 81 TABLET ORAL at 08:42

## 2018-01-09 RX ADMIN — PROPOFOL 40 MCG/KG/MIN: 10 INJECTION, EMULSION INTRAVENOUS at 15:58

## 2018-01-09 RX ADMIN — PROPOFOL 30 MCG/KG/MIN: 10 INJECTION, EMULSION INTRAVENOUS at 05:29

## 2018-01-09 RX ADMIN — DEXTROSE AND SODIUM CHLORIDE 125 ML/HR: 5; 450 INJECTION, SOLUTION INTRAVENOUS at 02:40

## 2018-01-09 RX ADMIN — SODIUM CHLORIDE 50 ML/HR: 0.9 INJECTION, SOLUTION INTRAVENOUS at 09:41

## 2018-01-09 RX ADMIN — Medication 1 TABLET: at 21:44

## 2018-01-09 RX ADMIN — PROPOFOL 50 MCG/KG/MIN: 10 INJECTION, EMULSION INTRAVENOUS at 10:42

## 2018-01-09 RX ADMIN — HEPARIN SODIUM 5000 UNITS: 5000 INJECTION, SOLUTION INTRAVENOUS; SUBCUTANEOUS at 05:07

## 2018-01-09 RX ADMIN — FOLIC ACID 1 MG: 5 INJECTION, SOLUTION INTRAMUSCULAR; INTRAVENOUS; SUBCUTANEOUS at 08:42

## 2018-01-09 RX ADMIN — CEFEPIME HYDROCHLORIDE 1000 MG: 1 INJECTION, SOLUTION INTRAVENOUS at 14:08

## 2018-01-09 RX ADMIN — THIAMINE HYDROCHLORIDE 50 MG: 100 INJECTION, SOLUTION INTRAMUSCULAR; INTRAVENOUS at 08:42

## 2018-01-09 RX ADMIN — CEFEPIME HYDROCHLORIDE 1000 MG: 1 INJECTION, SOLUTION INTRAVENOUS at 02:43

## 2018-01-09 RX ADMIN — HEPARIN SODIUM 5000 UNITS: 5000 INJECTION, SOLUTION INTRAVENOUS; SUBCUTANEOUS at 14:08

## 2018-01-09 RX ADMIN — PROPOFOL 35 MCG/KG/MIN: 10 INJECTION, EMULSION INTRAVENOUS at 20:18

## 2018-01-09 RX ADMIN — HEPARIN SODIUM 5000 UNITS: 5000 INJECTION, SOLUTION INTRAVENOUS; SUBCUTANEOUS at 21:44

## 2018-01-09 RX ADMIN — CHLORHEXIDINE GLUCONATE 15 ML: 1.2 RINSE ORAL at 20:17

## 2018-01-09 NOTE — PROGRESS NOTES
Progress Note - Critical Care   Jeni Han 76 y o  female MRN: [id number]  Unit/Bed#: ICU 04 Encounter: [id number]    Assessment/Plan:  1  Acute hypoxic respiratory failure likely multifactorial related to metabolic encephalopathy, metabolic derangements and possible urinary tract infection  · Her mental status is improving  We will begin weaning today with a hope to liberate her from the ventilator  2  Diabetes mellitus with HHNK  · This has improved and the patient's blood glucose is better controlled  · We will attempt to wean her from the continuous insulin infusion and transition her to sliding scale insulin and long-acting insulin with a goal to maintain her blood glucose between 140 and 180   3  Urinary tract infection-Klebsiella   · We will continue her on cefepime for now pending the sensitivity results  4  Non STEMI type 2 likely demand ischemia related to #2 and # 3  · An echocardiogram was ordered yesterday and has not been completed yet  · She may need an eventual ischemic workup  5  Acute metabolic encephalopathy likely multifactorial related to #1  and #2 -slowly improving  · Will attempt to minimize sedating agents to prevent the development of delirium  6   Bilateral upper extremity movement disorder likely secondary to profound hyperglycemia-now improved  We will monitor her off anti epileptic drugs  Critical Care Time:   Documented critical care time excludes any procedures documented elsewhere  It also excludes any family updates    _____________________________________________________________________    HPI/24hr events:   No events overnight    The patient is intermittently following commands    Medications:    aspirin 81 mg Oral Daily   cefepime 1,000 mg Intravenous Q12H   docusate sodium 100 mg Oral BID   folic acid 1 mg Intravenous Daily   heparin (porcine) 5,000 Units Subcutaneous Q8H [address]   senna 1 tablet Oral HS   thiamine 50 mg Intravenous Daily         dexmedetomidine 0 1-0 7 mcg/kg/hr    dextrose 5 % and sodium chloride 0 45 % 125 mL/hr Last Rate: 125 mL/hr (01/09/18 0240)   dextrose 125 mL/hr Last Rate: Stopped (01/07/18 2232)   insulin regular (HumuLIN R,NovoLIN R) infusion 0 3-21 Units/hr Last Rate: 5 Units/hr (01/09/18 0400)   propofol 5-50 mcg/kg/min Last Rate: 30 mcg/kg/min (01/09/18 0529)         Physical exam:  Vitals: Body mass index is 27 61 kg/m²  Blood pressure 159/85, pulse 102, temperature (!) 100 8 °F (38 2 °C), resp  rate 19, height 5' 6" (1 676 m), weight 77 6 kg (171 lb 1 2 oz), SpO2 97 %  ,  Temp  Min: 96 8 °F (36 °C)  Max: 102 6 °F (39 2 °C)  IBW: 59 3 kg    SpO2: 97 %  SpO2 Activity: At Rest  O2 Device: Nasal cannula      Intake/Output Summary (Last 24 hours) at 01/09/18 0606  Last data filed at 01/09/18 0400   Gross per 24 hour   Intake          4131 31 ml   Output              814 ml   Net          3317 31 ml       Invasive/non-invasive ventilation settings:   Respiratory    Lab Data (Last 4 hours)    None         O2/Vent Data (Last 4 hours)      01/09 0252           Vent Mode AC/VC       Resp Rate (BPM) (BPM) 14       Vt (mL) (mL) 400       FIO2 (%) (%) 50       PEEP (cmH2O) (cmH2O) 5       MV 9                 Invasive Devices     Peripheral Intravenous Line            Peripheral IV 01/07/18 Left Antecubital 1 day    Peripheral IV 01/07/18 Left Wrist 1 day    Peripheral IV 01/07/18 Right Wrist 1 day          Drain            NG/OG/Enteral Tube Orogastric Right mouth 1 day    Urethral Catheter Temperature probe 1 day          Airway            ETT  Hi-Lo; Cuffed; Inflated 8 mm 1 day                  Physical Exam:  Gen:  Sedated but arousable  HEENT:  Pupils are equal round and reactive to light  Neck:  Supple negative for lymphadenopathy  Chest:  Diminished in the bases bilaterally  Cor:  Regular rate and rhythm  Abd:  Soft and nontender  Ext:  The there is no edema clubbing or cyanosis  Neuro:  Sedated but arousable  Skin:  Warm and dry      Diagnostic Data:  Lab: I have personally reviewed pertinent lab results  Microbiology:    Results from last 7 days  Lab Units 01/07/18  1048 01/07/18  1041 01/07/18  1000   BLOOD CULTURE   --   --  No Growth at 24 hrs  GRAM STAIN RESULT  Gram negative rods  --   --    URINE CULTURE   --  50,000-59,000 cfu/ml Klebsiella-Enterobacter  group*  --        Imaging:      Cardiac lab/EKG/telemetry/ECHO:       VTE Prophylaxis:  Heparin    Code Status: Level 1 - Full Code    STORM Rodriguez    Portions of the record may have been created with voice recognition software  Occasional wrong word or "sound a like" substitutions may have occurred due to the inherent limitations of voice recognition software  Read the chart carefully and recognize, using context, where substitutions have occurred

## 2018-01-10 ENCOUNTER — APPOINTMENT (INPATIENT)
Dept: RADIOLOGY | Facility: HOSPITAL | Age: 78
DRG: 871 | End: 2018-01-10
Payer: COMMERCIAL

## 2018-01-10 LAB
ANION GAP SERPL CALCULATED.3IONS-SCNC: 9 MMOL/L (ref 4–13)
BACTERIA BLD CULT: ABNORMAL
BUN SERPL-MCNC: 37 MG/DL (ref 5–25)
CALCIUM SERPL-MCNC: 8.4 MG/DL (ref 8.3–10.1)
CHLORIDE SERPL-SCNC: 107 MMOL/L (ref 100–108)
CO2 SERPL-SCNC: 29 MMOL/L (ref 21–32)
CREAT SERPL-MCNC: 1.68 MG/DL (ref 0.6–1.3)
GFR SERPL CREATININE-BSD FRML MDRD: 29 ML/MIN/1.73SQ M
GLUCOSE SERPL-MCNC: 107 MG/DL (ref 65–140)
GLUCOSE SERPL-MCNC: 121 MG/DL (ref 65–140)
GLUCOSE SERPL-MCNC: 127 MG/DL (ref 65–140)
GLUCOSE SERPL-MCNC: 134 MG/DL (ref 65–140)
GLUCOSE SERPL-MCNC: 138 MG/DL (ref 65–140)
GLUCOSE SERPL-MCNC: 143 MG/DL (ref 65–140)
GLUCOSE SERPL-MCNC: 144 MG/DL (ref 65–140)
GLUCOSE SERPL-MCNC: 145 MG/DL (ref 65–140)
GLUCOSE SERPL-MCNC: 156 MG/DL (ref 65–140)
GLUCOSE SERPL-MCNC: 158 MG/DL (ref 65–140)
GLUCOSE SERPL-MCNC: 172 MG/DL (ref 65–140)
GLUCOSE SERPL-MCNC: 187 MG/DL (ref 65–140)
GRAM STN SPEC: ABNORMAL
MAGNESIUM SERPL-MCNC: 1.9 MG/DL (ref 1.6–2.6)
PHOSPHATE SERPL-MCNC: 2.2 MG/DL (ref 2.3–4.1)
POTASSIUM SERPL-SCNC: 3.6 MMOL/L (ref 3.5–5.3)
SODIUM SERPL-SCNC: 145 MMOL/L (ref 136–145)

## 2018-01-10 PROCEDURE — 80048 BASIC METABOLIC PNL TOTAL CA: CPT | Performed by: NURSE PRACTITIONER

## 2018-01-10 PROCEDURE — 82948 REAGENT STRIP/BLOOD GLUCOSE: CPT

## 2018-01-10 PROCEDURE — 84100 ASSAY OF PHOSPHORUS: CPT | Performed by: NURSE PRACTITIONER

## 2018-01-10 PROCEDURE — 83735 ASSAY OF MAGNESIUM: CPT | Performed by: NURSE PRACTITIONER

## 2018-01-10 PROCEDURE — 94003 VENT MGMT INPAT SUBQ DAY: CPT

## 2018-01-10 PROCEDURE — 71045 X-RAY EXAM CHEST 1 VIEW: CPT

## 2018-01-10 RX ORDER — LEVOFLOXACIN 5 MG/ML
500 INJECTION, SOLUTION INTRAVENOUS EVERY 24 HOURS
Status: DISCONTINUED | OUTPATIENT
Start: 2018-01-10 | End: 2018-01-16

## 2018-01-10 RX ORDER — FUROSEMIDE 10 MG/ML
40 INJECTION INTRAMUSCULAR; INTRAVENOUS ONCE
Status: COMPLETED | OUTPATIENT
Start: 2018-01-10 | End: 2018-01-10

## 2018-01-10 RX ADMIN — CHLORHEXIDINE GLUCONATE 15 ML: 1.2 RINSE ORAL at 08:50

## 2018-01-10 RX ADMIN — HEPARIN SODIUM 5000 UNITS: 5000 INJECTION, SOLUTION INTRAVENOUS; SUBCUTANEOUS at 05:10

## 2018-01-10 RX ADMIN — Medication 1 TABLET: at 21:40

## 2018-01-10 RX ADMIN — POTASSIUM PHOSPHATE, MONOBASIC AND POTASSIUM PHOSPHATE, DIBASIC 21 MMOL: 224; 236 INJECTION, SOLUTION INTRAVENOUS at 08:50

## 2018-01-10 RX ADMIN — LEVOFLOXACIN 500 MG: 5 INJECTION, SOLUTION INTRAVENOUS at 14:25

## 2018-01-10 RX ADMIN — PROPOFOL 35 MCG/KG/MIN: 10 INJECTION, EMULSION INTRAVENOUS at 02:23

## 2018-01-10 RX ADMIN — HEPARIN SODIUM 5000 UNITS: 5000 INJECTION, SOLUTION INTRAVENOUS; SUBCUTANEOUS at 21:40

## 2018-01-10 RX ADMIN — FUROSEMIDE 40 MG: 10 INJECTION, SOLUTION INTRAMUSCULAR; INTRAVENOUS at 06:12

## 2018-01-10 RX ADMIN — SODIUM CHLORIDE 50 ML/HR: 0.9 INJECTION, SOLUTION INTRAVENOUS at 02:24

## 2018-01-10 RX ADMIN — ASPIRIN 81 MG 81 MG: 81 TABLET ORAL at 08:50

## 2018-01-10 RX ADMIN — HEPARIN SODIUM 5000 UNITS: 5000 INJECTION, SOLUTION INTRAVENOUS; SUBCUTANEOUS at 14:24

## 2018-01-10 RX ADMIN — PROPOFOL 35 MCG/KG/MIN: 10 INJECTION, EMULSION INTRAVENOUS at 06:10

## 2018-01-10 RX ADMIN — SODIUM CHLORIDE 1.5 UNITS/HR: 9 INJECTION, SOLUTION INTRAVENOUS at 05:12

## 2018-01-10 RX ADMIN — CEFEPIME HYDROCHLORIDE 1000 MG: 1 INJECTION, SOLUTION INTRAVENOUS at 02:23

## 2018-01-10 NOTE — PROGRESS NOTES
Progress Note - Critical Care   Jeanette Marie 76 y o  female MRN: [id number]  Unit/Bed#: ICU 04 Encounter: [id number]    Assessment/Plan:  1  Acute hypoxic respiratory failure likely multifactorial related to metabolic encephalopathy, metabolic derangements secondary to HHNK and possible urinary tract infection  · The patient appears to have at least some evidence of volume overload this morning  We will give her 40 mg of Lasix prior to a weaning trial today  2  Diabetes mellitus with HHNK-improved  · Will transition the patient to sliding scale and long acting insulin with a goal to maintain her blood glucose between 140 and 180   3  Sepsis with Klebsiella urinary tract infection with 1 of 2 blood cultures also growing Klebsiella  · The organism is pansensitive  We will change her antibiotics to cefazolin to complete a 10 day course, currently day 4  4  Non STEMI type 2 likely related to demand ischemia in the setting of 1 , 2 and 3  · The echocardiogram reveals a preserved EF of approximately 60%  There is Doppler evidence of grade 1 diastolic dysfunction  · She may need an outpatient ischemic workup  5  Acute metabolic encephalopathy likely multifactorial related to 1 , 2 , and 3 -improved  6  Bilateral upper extremity movement disorder likely secondary to profound hyperglycemia-now resolved  7  Acute kidney injury on on known chronic kidney disease status-slowly improving      Critical Care Time:   Documented critical care time excludes any procedures documented elsewhere  It also excludes any family updates    _____________________________________________________________________    HPI/24hr events:   No events overnight  The patient is easily arousable and following commands this morning      Medications:    aspirin 81 mg Oral Daily   cefepime 1,000 mg Intravenous Q12H   chlorhexidine 15 mL Swish & Spit Q12H [address]   heparin (porcine) 5,000 Units Subcutaneous Q8H [address]   senna-docusate sodium 1 tablet Oral HS insulin regular (HumuLIN R,NovoLIN R) infusion 0 3-21 Units/hr Last Rate: 1 5 Units/hr (01/10/18 0512)   propofol 5-50 mcg/kg/min Last Rate: 35 mcg/kg/min (01/10/18 0223)   sodium chloride 50 mL/hr Last Rate: 50 mL/hr (01/10/18 0224)         Physical exam:  Vitals: Body mass index is 27 61 kg/m²  Blood pressure 127/59, pulse 82, temperature 99 3 °F (37 4 °C), resp  rate 18, height 5' 6" (1 676 m), weight 77 6 kg (171 lb 1 2 oz), SpO2 96 %  ,  Temp  Min: 96 8 °F (36 °C)  Max: 102 6 °F (39 2 °C)  IBW: 59 3 kg    SpO2: 96 %  SpO2 Activity: At Rest  O2 Device: Nasal cannula      Intake/Output Summary (Last 24 hours) at 01/10/18 0607  Last data filed at 01/10/18 0400   Gross per 24 hour   Intake          2912 18 ml   Output             2155 ml   Net           757 18 ml       Invasive/non-invasive ventilation settings:   Respiratory    Lab Data (Last 4 hours)    None         O2/Vent Data (Last 4 hours)      01/10 0302           Vent Mode AC/VC       Resp Rate (BPM) (BPM) 14       Vt (mL) (mL) 400       FIO2 (%) (%) 40       PEEP (cmH2O) (cmH2O) 5       MV 7 38                 Invasive Devices     Peripheral Intravenous Line            Peripheral IV 01/07/18 Left Antecubital 2 days    Peripheral IV 01/07/18 Left Wrist 2 days    Peripheral IV 01/07/18 Right Wrist 2 days          Drain            NG/OG/Enteral Tube Orogastric Right mouth 2 days    Urethral Catheter Temperature probe 2 days          Airway            ETT  Hi-Lo; Cuffed; Inflated 8 mm 2 days                  Physical Exam:  Gen:  Sedated but easily arousable  HEENT:  Pupils are equal round and reactive to light  Neck:  Supple negative for lymphadenopathy  Chest:  Coarse bilaterally  Cor:  Regular rate and rhythm  Abd:  Soft and nontender  Ext:  There is no significant edema  Neuro:  Sedated but easily arousable, moves her extremities  Skin:  Warm and dry      Diagnostic Data:  Lab: I have personally reviewed pertinent lab results         CMP: Results from last 7 days  Lab Units 01/10/18  0507 01/09/18  0807 01/08/18  1444  01/07/18  2114 01/07/18  1856  01/07/18  1000   SODIUM mmol/L 145 145 148*  < > 150* 149*  < > 127*   POTASSIUM mmol/L 3 6 3 9 3 7  < > 3 2* 4 0  < > 2 5*   CHLORIDE mmol/L 107 109* 112*  < > 109* 103  < > 75*   CO2 mmol/L 29 29 29  < > 27 30  < > 28   BUN mg/dL 37* 32* 25  < > 23 22  < > 22   CREATININE mg/dL 1 68* 2 07* 1 74*  < > 1 75* 1 96*  < > 2 26*   CALCIUM mg/dL 8 4 8 1* 8 3  < > 8 9 9 3  < > 10 5*   TOTAL PROTEIN g/dL  --   --   --   --  7 1 7 9  --  10 1*   BILIRUBIN TOTAL mg/dL  --   --   --   --  0 56 0 51  --  0 76   ALK PHOS U/L  --   --   --   --  128* 148*  --  187*   ALT U/L  --   --   --   --  12 14  --  15   AST U/L  --   --   --   --  23 20  --  13   GLUCOSE RANDOM mg/dL 145* 211* 100  < > 394* 681*  < > 1,470*   < > = values in this interval not displayed  PT/INR:   No results found for: PT, INR,   Magnesium:   Results from last 7 days  Lab Units 01/10/18  0507 01/08/18  1444 01/08/18  1031   MAGNESIUM mg/dL 1 9 2 1 2 3     Phosphorous:   Results from last 7 days  Lab Units 01/10/18  0507 01/09/18  0807 01/08/18  1444   PHOSPHORUS mg/dL 2 2* 2 1* 0 8*       Microbiology:    Results from last 7 days  Lab Units 01/07/18  1048 01/07/18  1041 01/07/18  1000   BLOOD CULTURE    Klebsiella pneumoniae*  --  No Growth at 48 hrs  GRAM STAIN RESULT  Gram negative rods  --   --    URINE CULTURE   --  50,000-59,000 cfu/ml Klebsiella pneumoniae*  --        Imaging:      Cardiac lab/EKG/telemetry/ECHO:       VTE Prophylaxis:  Heparin    Code Status: Level 1 - Full Code    Sheri [place], CRNP    Portions of the record may have been created with voice recognition software  Occasional wrong word or "sound a like" substitutions may have occurred due to the inherent limitations of voice recognition software  Read the chart carefully and recognize, using context, where substitutions have occurred

## 2018-01-10 NOTE — PLAN OF CARE
GASTROINTESTINAL - ADULT     Maintains or returns to baseline bowel function Progressing     Maintains adequate nutritional intake Progressing        METABOLIC, FLUID AND ELECTROLYTES - ADULT     Electrolytes maintained within normal limits Progressing     Fluid balance maintained Progressing     Glucose maintained within target range Progressing        NEUROSENSORY - ADULT     Achieves stable or improved neurological status Progressing        Nutrition/Hydration-ADULT     Nutrient/Hydration intake appropriate for improving, restoring or maintaining nutritional needs Progressing        Potential for Falls     Patient will remain free of falls Progressing        Prexisting or High Potential for Compromised Skin Integrity     Skin integrity is maintained or improved Progressing        RESPIRATORY - ADULT     Achieves optimal ventilation and oxygenation Progressing        SKIN/TISSUE INTEGRITY - ADULT     Skin integrity remains intact Progressing

## 2018-01-11 LAB
ANION GAP SERPL CALCULATED.3IONS-SCNC: 9 MMOL/L (ref 4–13)
BUN SERPL-MCNC: 36 MG/DL (ref 5–25)
CALCIUM SERPL-MCNC: 7.7 MG/DL (ref 8.3–10.1)
CHLORIDE SERPL-SCNC: 106 MMOL/L (ref 100–108)
CO2 SERPL-SCNC: 31 MMOL/L (ref 21–32)
CREAT SERPL-MCNC: 1.47 MG/DL (ref 0.6–1.3)
GFR SERPL CREATININE-BSD FRML MDRD: 34 ML/MIN/1.73SQ M
GLUCOSE SERPL-MCNC: 106 MG/DL (ref 65–140)
GLUCOSE SERPL-MCNC: 106 MG/DL (ref 65–140)
GLUCOSE SERPL-MCNC: 114 MG/DL (ref 65–140)
GLUCOSE SERPL-MCNC: 116 MG/DL (ref 65–140)
GLUCOSE SERPL-MCNC: 116 MG/DL (ref 65–140)
GLUCOSE SERPL-MCNC: 122 MG/DL (ref 65–140)
GLUCOSE SERPL-MCNC: 123 MG/DL (ref 65–140)
GLUCOSE SERPL-MCNC: 127 MG/DL (ref 65–140)
GLUCOSE SERPL-MCNC: 144 MG/DL (ref 65–140)
POTASSIUM SERPL-SCNC: 3.6 MMOL/L (ref 3.5–5.3)
SODIUM SERPL-SCNC: 146 MMOL/L (ref 136–145)

## 2018-01-11 PROCEDURE — 97163 PT EVAL HIGH COMPLEX 45 MIN: CPT

## 2018-01-11 PROCEDURE — G8988 SELF CARE GOAL STATUS: HCPCS

## 2018-01-11 PROCEDURE — 82948 REAGENT STRIP/BLOOD GLUCOSE: CPT

## 2018-01-11 PROCEDURE — G8979 MOBILITY GOAL STATUS: HCPCS

## 2018-01-11 PROCEDURE — G8978 MOBILITY CURRENT STATUS: HCPCS

## 2018-01-11 PROCEDURE — 80048 BASIC METABOLIC PNL TOTAL CA: CPT | Performed by: NURSE PRACTITIONER

## 2018-01-11 PROCEDURE — 97167 OT EVAL HIGH COMPLEX 60 MIN: CPT

## 2018-01-11 PROCEDURE — 92610 EVALUATE SWALLOWING FUNCTION: CPT

## 2018-01-11 PROCEDURE — G8987 SELF CARE CURRENT STATUS: HCPCS

## 2018-01-11 RX ORDER — INSULIN GLARGINE 100 [IU]/ML
10 INJECTION, SOLUTION SUBCUTANEOUS
Status: DISCONTINUED | OUTPATIENT
Start: 2018-01-11 | End: 2018-01-16

## 2018-01-11 RX ORDER — FUROSEMIDE 10 MG/ML
40 INJECTION INTRAMUSCULAR; INTRAVENOUS ONCE
Status: COMPLETED | OUTPATIENT
Start: 2018-01-11 | End: 2018-01-11

## 2018-01-11 RX ADMIN — Medication 1 TABLET: at 21:43

## 2018-01-11 RX ADMIN — HEPARIN SODIUM 5000 UNITS: 5000 INJECTION, SOLUTION INTRAVENOUS; SUBCUTANEOUS at 06:23

## 2018-01-11 RX ADMIN — INSULIN GLARGINE 10 UNITS: 100 INJECTION, SOLUTION SUBCUTANEOUS at 21:41

## 2018-01-11 RX ADMIN — FUROSEMIDE 40 MG: 10 INJECTION, SOLUTION INTRAMUSCULAR; INTRAVENOUS at 10:54

## 2018-01-11 RX ADMIN — ASPIRIN 81 MG 81 MG: 81 TABLET ORAL at 08:17

## 2018-01-11 RX ADMIN — LEVOFLOXACIN 500 MG: 5 INJECTION, SOLUTION INTRAVENOUS at 13:42

## 2018-01-11 RX ADMIN — HEPARIN SODIUM 5000 UNITS: 5000 INJECTION, SOLUTION INTRAVENOUS; SUBCUTANEOUS at 13:42

## 2018-01-11 RX ADMIN — HEPARIN SODIUM 5000 UNITS: 5000 INJECTION, SOLUTION INTRAVENOUS; SUBCUTANEOUS at 21:41

## 2018-01-11 RX ADMIN — SODIUM CHLORIDE 50 ML/HR: 0.9 INJECTION, SOLUTION INTRAVENOUS at 00:09

## 2018-01-11 NOTE — PLAN OF CARE
Problem: SLP ADULT - SWALLOWING, IMPAIRED  Goal: Initial SLP swallow eval performed  Outcome: Completed Date Met: 01/11/18

## 2018-01-11 NOTE — PROGRESS NOTES
Sister called for update on patient, this is her only sister that is alive  The patient was mentioning that she wanted to be a DNR/DNI earlier when she was more alert  Her sister agreed that she would not want to be reintubated or would want cpr   Due to this patient was made a dnr/dni

## 2018-01-11 NOTE — PLAN OF CARE

## 2018-01-11 NOTE — PROGRESS NOTES
Progress Note - Critical Care   Pedro Dale 76 y o  female MRN: [id number]  Unit/Bed#: ICU 04 Encounter: [id number]    Assessment/Plan:  1  Acute hypoxic respiratory failure likely multifactorial due to encephalopathy, metabolic derangements secondary to HHNK and possible UTI  1  Patient extubated yesterday  Maintains an oxygen saturation of 94% on 5 liters  itrtate to keep sat > 90%  2  Acute metabolic encephalopathy:improved  3  Diabetes mellitis with HHNK  1  transition to lantis and SSI  4  NSTEMI type II secondary to [address] and UTI  5  Sepsis with klebsiella UTI  1  Continue Levaquin for 10 days  Currently day #5  6  Acute renal failure with unknown baseline creatinine  1  creatinine remains stable      Critical Care Time:   Documented critical care time excludes any procedures documented elsewhere  It also excludes any family updates    _____________________________________________________________________    HPI/24hr events:   Patient extubated yesterday  tolerating nasal cannula  She offer no complaints    Medications:    aspirin 81 mg Oral Daily   heparin (porcine) 5,000 Units Subcutaneous Q8H [address]   levofloxacin 500 mg Intravenous Q24H   senna-docusate sodium 1 tablet Oral HS         insulin regular (HumuLIN R,NovoLIN R) infusion 0 3-21 Units/hr Last Rate: 0 5 Units/hr (01/11/18 0400)   sodium chloride 50 mL/hr Last Rate: 50 mL/hr (01/11/18 0500)         Physical exam:  Vitals: Body mass index is 27 61 kg/m²  Blood pressure 116/71, pulse (!) 106, temperature 99 °F (37 2 °C), resp  rate (!) 24, height 5' 6" (1 676 m), weight 77 6 kg (171 lb 1 2 oz), SpO2 90 %  ,  Temp  Min: 96 8 °F (36 °C)  Max: 102 6 °F (39 2 °C)  IBW: 59 3 kg    SpO2: 90 %  SpO2 Activity: At Rest  O2 Device: Nasal cannula      Intake/Output Summary (Last 24 hours) at 01/11/18 0614  Last data filed at 01/11/18 0500   Gross per 24 hour   Intake          2055 04 ml   Output             4120 ml   Net         -2064 96 ml Invasive/non-invasive ventilation settings:   Respiratory    Lab Data (Last 4 hours)    None         O2/Vent Data (Last 4 hours)    None              Invasive Devices     Peripheral Intravenous Line            Peripheral IV 01/07/18 Left Wrist 3 days    Peripheral IV 01/07/18 Right Wrist 3 days          Drain            Urethral Catheter Temperature probe 3 days                  Physical Exam:  Gen: no acute distress  HEENT: NC/At PERRLAEOMI  Neck: supple, no JVD, trachea midline, no adenopathy  Chest: CTA  Cor: Clear S1 and S2  Abd: soft NT/ND +BS  Ext: +1 edema  Neuro: non-focal  Skin: W/D/I      Diagnostic Data:  Lab: I have personally reviewed pertinent lab results  CBC:     Results from last 7 days  Lab Units 01/09/18  0507 01/07/18  1000   WBC Thousand/uL 7 87 16 54*   HEMOGLOBIN g/dL 11 6 17 9*   HEMATOCRIT % 36 2 49 9*   PLATELETS Thousands/uL 223 590*       CMP:     Results from last 7 days  Lab Units 01/10/18  0507 01/09/18  0807 01/08/18  1444  01/07/18  2114 01/07/18  1856  01/07/18  1000   SODIUM mmol/L 145 145 148*  < > 150* 149*  < > 127*   POTASSIUM mmol/L 3 6 3 9 3 7  < > 3 2* 4 0  < > 2 5*   CHLORIDE mmol/L 107 109* 112*  < > 109* 103  < > 75*   CO2 mmol/L 29 29 29  < > 27 30  < > 28   BUN mg/dL 37* 32* 25  < > 23 22  < > 22   CREATININE mg/dL 1 68* 2 07* 1 74*  < > 1 75* 1 96*  < > 2 26*   CALCIUM mg/dL 8 4 8 1* 8 3  < > 8 9 9 3  < > 10 5*   TOTAL PROTEIN g/dL  --   --   --   --  7 1 7 9  --  10 1*   BILIRUBIN TOTAL mg/dL  --   --   --   --  0 56 0 51  --  0 76   ALK PHOS U/L  --   --   --   --  128* 148*  --  187*   ALT U/L  --   --   --   --  12 14  --  15   AST U/L  --   --   --   --  23 20  --  13   GLUCOSE RANDOM mg/dL 145* 211* 100  < > 394* 681*  < > 1,470*   < > = values in this interval not displayed    PT/INR:   No results found for: PT, INR,   Magnesium:   Results from last 7 days  Lab Units 01/10/18  0507 01/08/18  1444 01/08/18  1031   MAGNESIUM mg/dL 1 9 2 1 2 3 Phosphorous:   Results from last 7 days  Lab Units 01/10/18  0507 01/09/18  0807 01/08/18  1444   PHOSPHORUS mg/dL 2 2* 2 1* 0 8*       Microbiology:    Results from last 7 days  Lab Units 01/09/18  1014 01/07/18  1048 01/07/18  1041 01/07/18  1000   BLOOD CULTURE  No Growth at 24 hrs  No Growth at 24 hrs  Klebsiella pneumoniae*  --  No Growth at 72 hrs  GRAM STAIN RESULT   --  Gram negative rods  --   --    URINE CULTURE   --   --  50,000-59,000 cfu/ml Klebsiella pneumoniae*  --        Imaging:  none    Cardiac lab/EKG/telemetry/ECHO:   SR    VTE Prophylaxis: heparin     Code Status: Level 1 - Full Code    STORM [place]    Portions of the record may have been created with voice recognition software  Occasional wrong word or "sound a like" substitutions may have occurred due to the inherent limitations of voice recognition software  Read the chart carefully and recognize, using context, where substitutions have occurred

## 2018-01-11 NOTE — OCCUPATIONAL THERAPY NOTE
OccupationalTherapy Evaluation(time=1130-1150)     Patient Name: Onur Villagomez  KBVJV'F Date: 1/11/2018  Problem List  Patient Active Problem List   Diagnosis    Encephalopathy    Hypokalemia    [facility]    Dehydration     Past Medical History  Past Medical History:   Diagnosis Date    Arthritis     Diabetes mellitus ([facility]     Hypertension     Memory loss      Past Surgical History  History reviewed  No pertinent surgical history  01/11/18 1150   Note Type   Note type Eval only   Restrictions/Precautions   Other Precautions Fall Risk;Telemetry;Multiple lines;O2;Cognitive; Bed Alarm   Pain Assessment   Pain Assessment FLACC   Pain Rating: FLACC (Rest) - Face 0   Pain Rating: FLACC (Rest) - Legs 0   Pain Rating: FLACC (Rest) - Activity 0   Pain Rating: FLACC (Rest) - Cry 0   Pain Rating: FLACC (Rest) - Consolability 0   Score: FLACC (Rest) 0   Home Living   Type of Home Apartment   Home Equipment (denies)   Prior Function   Lives With Alone   Lifestyle   Autonomy PTA pt states independence with all aspects of her ADLs, transfers, ambulation; neg , +falls=1   Reciprocal Relationships 0 children, supportive sister and neighbor   Service to Others states never employed    Intrinsic Gratification watching TV   Psychosocial   Psychosocial (WDL) X   Patient Behaviors/Mood Flat affect;Depressed; Cooperative   Subjective   Subjective "I don't eat because I don't want to live "   ADL   Where Assessed Edge of bed   Eating Assistance 5  Supervision/Setup   Grooming Assistance 4  Minimal Assistance   UB Bathing Assistance 3  Moderate Assistance   LB Bathing Assistance 2  Maximal [address] 3  Moderate Assistance   LB Dressing Assistance 2  Maximal [address]  1  Total Assistance   Toileting Deficit Perineal hygiene;Clothing management down;Clothing management up; Increased time to complete;Verbal cueing   Bed Mobility   Rolling R 4  Minimal assistance   Additional items Assist x 1   Rolling L 4  Minimal assistance   Additional items Assist x 1   Sit to Supine 3  Moderate assistance   Additional items Assist x 2   Transfers   Sit to Stand 4  Minimal assistance   Additional items Assist x 2   Stand to Sit 4  Minimal assistance   Additional items Assist x 2; Increased time required   Functional Mobility   Functional Mobility 4  Minimal assistance   Additional Comments x2   Additional items Rolling walker   Balance   Static Sitting Fair +   Dynamic Sitting Fair -   Static Standing Poor +   Dynamic Standing Poor   Activity Tolerance   Activity Tolerance Patient limited by fatigue   Medical Staff Made Aware nsg-[email or web address]   RUE Assessment   RUE Assessment WFL   RUE Strength   RUE Overall Strength Within Functional Limits - able to perform ADL tasks with strength  (3+/5 throughout; limited pt effort)   LUE Assessment   LUE Assessment WFL   LUE Strength   LUE Overall Strength Within Functional Limits - able to perform ADL tasks with strength  (3+/5 throughout; limited pt effort)   Hand Function   Gross Motor Coordination Functional   Fine Motor Coordination Functional   Sensation   Light Touch No apparent deficits   Proprioception   Proprioception No apparent deficits   Vision-Basic Assessment   Current Vision (glasses)   Vision - Complex Assessment   Acuity (impaired)   Perception   Inattention/Neglect Appears intact   Cognition   Overall Cognitive Status Impaired   Arousal/Participation Alert   Attention Attends with cues to redirect   Orientation Level Oriented to person   Memory Decreased short term memory;Decreased recall of precautions   Following Commands Follows one step commands with increased time or repetition   Comments Pt with hx memory loss   Assessment   Limitation Decreased ADL status; Decreased UE strength;Decreased Safe judgement during ADL;Decreased cognition;Decreased endurance;Decreased high-level ADLs   Prognosis Fair   Assessment Pt is a 78y/o female admitted to the hospital after being found on the flr of her apt  Pt noted with hyperglycemia, metabolic encephaopathy, and respiratory distress/failure--developing into VDRF  PTA pt states independence with all aspects of her ADLs, transfers, ambulation; neg , +falls=1  During initial eval, pt demonstrated deficits with her functional balance, functional mobility, ADL status, b/l UE strength, activity tolerance(currently fair=15-20mins), and cognition(i e memory, orientation, problem-solving)  Pt would benefit from continued OT tx for the above deficits  3-5xwk/1-2wks  Goals   Patient Goals "I want to rest "   STG Time Frame 3-5   Short Term Goal #1 Pt will tolerate continued cognitive/home-safety assessment and appropriate d/c recommendations will be provided  Short Term Goal #2 Pt will demonstrate mod I with their bed mobility to facilitate EOB ADLs  Short Term Goal  Pt will demonstrate mod I with their sit-stand transfers to assist with completion of their LE dressing  LTG Time Frame (5-10days)   Long Term Goal #1 Pt will demonstrate proper walker/transfer safety 100% of the time  Long Term Goal #2 Pt will demonstrate g/g- balance with all functional activities  Long Term Goal Pt will demonstrate improved activity tolerance to good(20-30mins) and standing tolerance to 3-5mins to assist with ADLs  Plan   Treatment Interventions ADL retraining;Functional transfer training;UE strengthening/ROM; Endurance training;Cognitive reorientation;Patient/family training;Equipment evaluation/education; Compensatory technique education;Continued evaluation   Goal Expiration Date 01/22/18   Treatment Day 0   OT Frequency 3-5x/wk   Recommendation   OT Discharge Recommendation Short Term Rehab   Barthel Index   Feeding 5   Bathing 0   Grooming Score 0   Dressing Score 5   Bladder Score 5   Bowels Score 5   Toilet Use Score 0   Transfers (Bed/Chair) Score 5   Mobility (Level Surface) Score 0   Stairs Score 0   Barthel Index Score 25   Modified Arthur Scale   Modified [place] Scale 4   Nelson Camejo, OT

## 2018-01-11 NOTE — PLAN OF CARE
Problem: OCCUPATIONAL THERAPY ADULT  Goal: Performs self-care activities at highest level of function for planned discharge setting  See evaluation for individualized goals  Treatment Interventions: ADL retraining, Functional transfer training, UE strengthening/ROM, Endurance training, Cognitive reorientation, Patient/family training, Equipment evaluation/education, Compensatory technique education, Continued evaluation          See flowsheet documentation for full assessment, interventions and recommendations  Limitation: Decreased ADL status, Decreased UE strength, Decreased Safe judgement during ADL, Decreased cognition, Decreased endurance, Decreased high-level ADLs  Prognosis: Fair  Assessment: Pt is a 67y/o female admitted to the hospital after being found on the flr of her apt  Pt noted with hyperglycemia, metabolic encephaopathy, and respiratory distress/failure--developing into VDRF  PTA pt states independence with all aspects of her ADLs, transfers, ambulation; neg , +falls=1  During initial eval, pt demonstrated deficits with her functional balance, functional mobility, ADL status, b/l UE strength, activity tolerance(currently fair=15-20mins), and cognition(i e memory, orientation, problem-solving)  Pt would benefit from continued OT tx for the above deficits  3-5xwk/1-2wks        OT Discharge Recommendation: Short Term Rehab

## 2018-01-11 NOTE — PHYSICAL THERAPY NOTE
PT EVALUATION    76 y o     [id number]    Hypokalemia [E87 6]  Weakness [R53 1]  Elevated troponin [R74 8]  Severe dehydration [E86 0]  HHNC (hyperglycemic hyperosmolar nonketotic coma) ([facility] [address] ) [E11 01]    Past Medical History:   Diagnosis Date    Arthritis     [facility]     Hypertension     Memory loss          History reviewed  No pertinent surgical history  01/11/18 1144   Note Type   Note type Eval only   Pain Assessment   Pain Assessment No/denies pain   Home Living   Type of [address];Stairs to enter with rails  (flight of PHIL)   Home Equipment (denies)   Additional Comments Has one living sister and friend in building as support system   Prior Function   Level of [place] Independent with ADLs and functional mobility   Lives With Alone   Receives Help From Family; Neighbor   ADL Assistance Independent   IADLs Independent  (reports was not eating PTA)   Falls in the last 6 months 1 to 4  (denies, but was found on floor upon admission )   Comments States I PTA without AD per pt, however reports that she lives in [place]rise  Sister drives her for groceries  Neighbor and friend in building supportive  Restrictions/Precautions   Other Precautions Cognitive; Chair Alarm; Bed Alarm;Multiple lines;Telemetry;O2;Fall Risk   General   Additional Pertinent History Pt is 77 y/o female admitted after being found down at home  Encephalopathy and respiratory insufficienty requring intubation on 1/8 and extubated on 1/10  PT consulted for mobility assessment  Pt OOB to chair  Family/Caregiver Present No   Cognition   Overall Cognitive Status Impaired   RUE Assessment   RUE Assessment WFL   LUE Assessment   LUE Assessment WFL   RLE Assessment   RLE Assessment WFL  (grossly 3+/5)   LLE Assessment   LLE Assessment WFL  (groslsy 3+/5)   Bed Mobility   Sit to Supine 3  Moderate assistance   Additional items Assist x 2; Increased time required;LE management;Verbal cues Additional Comments OOB in chair upon arrival wanting to go back to bed  Transfers   Sit to Stand 4  Minimal assistance   Additional items Assist x 2; Increased time required;Verbal cues  (from elevated surface height )   Stand to Sit 4  Minimal assistance   Additional items Assist x 2; Increased time required;Verbal cues   Additional Comments with use of RW support   Ambulation/Elevation   Gait pattern Improper Weight shift;Decreased foot clearance; Inconsistent prateek; Excessively slow; Short stride   Gait Assistance 4  Minimal assist   Additional items Assist x 2;Verbal cues; Tactile cues   Assistive Device Rolling walker   Distance 4 lateral side steps toward HOB   + BARBOZA  O2 sats 87% on 6L  Balance   Static Sitting Fair +   Dynamic Sitting Fair -   Static Standing Poor +   Dynamic Standing Poor   Ambulatory Poor   Endurance Deficit   Endurance Deficit Yes   Endurance Deficit Description 6L O2, desaturates with mobilty  Fatigue, weakness  Activity Tolerance   Activity Tolerance Patient limited by fatigue;Treatment limited secondary to medical complications (Comment)   Medical Staff Made Aware NurseTim   Nurse Made Aware yes   Assessment   Prognosis Fair   Problem List Decreased strength;Decreased endurance; Impaired balance;Decreased mobility; Decreased cognition; Impaired judgement;Decreased safety awareness   Assessment Pt is 77 y/o female admitted with encephalopathy after being found down at home  Ultimately required intubation for respiratory insufficiency  Now extubated and PT consulted  PT presents with decreased overall strength, balance, activity tolerance and mobilty  On 6L desaturates with mobiltiy to 87%  Requires min to modA of 2 with mobilty and use of RW for support  Baseline independent function without AD per pt  Noted that pt expressing desire to die ( ICU staff aware of same with pt wishes)  States was not eating at home prior to admission    At present given impairments with mobilty will require ongoing PT in order to progress as well as recommending STR upon d/c from hospital to address impairments  Will follow and progress  Goals   Patient Goals "to die"   STG Expiration Date 01/21/18   Short Term Goal #1 10 days:  Bed mobiltiy with S   Transfers with Stacie  Amb with 'x1 with Stacie maintaining sats of 90% or greater  Improve activity tolerance to 45 minutes  Increase overall strength and balance 1/2 grade  Treatment Day 0   Plan   Treatment/Interventions Functional transfer training;LE strengthening/ROM; Therapeutic exercise; Endurance training;Patient/family training;Equipment eval/education; Bed mobility;Gait training; Compensatory technique education;Continued evaluation;Spoke to nursing;OT;Spoke to case management   PT Frequency 5x/wk   Recommendation   Recommendation Short-term skilled PT;Post acute IP rehab   Equipment Recommended Walker   PT - OK to Discharge (yes to rhb, no to home when medically clear )   Barthel Index   Feeding 5   Bathing 0   Grooming Score 0   Dressing Score 0   Bladder Score 5   Bowels Score 5   Toilet Use Score 5   Transfers (Bed/Chair) Score 5   Mobility (Level Surface) Score 0   Stairs Score 0   Barthel Index Score 25     History: co - morbidities, fall risk, use of assistive device, assist for adl's, cognition, multiple lines, lives alone  Exam: impairments in locomotion, musculoskeletal, balance, activity tolerance, self care, cognition, barthel 25/100, pulmonary  Clinical: unpredictable ( desaturates on 6L, fall risk, continuous monitoring in ICU)  Complexity: high  Veena Kras, PT

## 2018-01-11 NOTE — SPEECH THERAPY NOTE
Speech Language/Pathology  Speech/Language Pathology  Assessment    Patient Name: Pedro Dale  WLZJS'Y Date: 1/11/2018     Problem List  Patient Active Problem List   Diagnosis    Encephalopathy    Hypokalemia    Acute kidney injury ([facility] )    Dehydration     Past Medical History  Past Medical History:   Diagnosis Date    Arthritis     Diabetes mellitus ([facility] )     Hypertension     Memory loss    Per H&P  Assessment/Plan:  1  Metabolic encephalopathy  ·  multifactorial likely secondary to hypokalemia / HHNK /dehydration /malnutrition  Replete potassium, recheck blood glucose and start insulin drip  2    Hypokalemia  ·  replete potassium, monitor closely  3   dehydration  ·  continue IV fluids  4  HHNK  ·  continue IV fluids, initiate insulin drip when hypokalemia has resolved  5    Acute kidney injury likely secondary to dehydration        *      Continue to monitor kidney indices, continue IV fluids  6      Acute hypoxic respiratory failure        *      Continue supplemental oxygen as needed, monitor saturation of                             peripheral oxygen  7        Leukocytosis likely secondary to urinary tract infection  *        Initiate IV antibiotics     ______________________________________________________________  [facility] 1/7:  HPI:    Pedro Dale is a 76 y o  female who was found on the floor and confused by a neighbor  Neighbor states that she checks on the patient several times a day and estimates that she was down for approximately 2 hours  EMS was activated and patient was transported to [address] [address] Emergency Department for further evaluation and treatment  Upon my arrival to the emergency department patient is in bed making spastic motions with her bilateral arms  She is yelling I want my glasses off, but was not wearing glasses  She is able to state her name but answers unintelligibly to any other questions    I called her  listed as her sister, who returned my call and turns out to be her  not her sister  The  did have the patient's sister's name and phone number I called and left a message there with no return call  Any information is obtained from emergency department personal     Intubated 1/8, extubtaed 1/10     1/11 note:  Assessment/Plan:  2  Acute hypoxic respiratory failure likely multifactorial due to encephalopathy, metabolic derangements secondary to HHNK and possible UTI  1  Patient extubated yesterday  Maintains an oxygen saturation of 94% on 5 liters  itrtate to keep sat > 90%  3  Acute metabolic encephalopathy:improved  4  Diabetes mellitis with HHNK  1  transition to lantis and SSI  5  NSTEMI type II secondary to [address] and UTI  6  Sepsis with klebsiella UTI  1  Continue Levaquin for 10 days  Currently day #5  7  Acute renal failure with unknown baseline creatinine  1  creatinine remains stable    Ct abd and pelvis 1/7:  IMPRESSION:  Tiny gas bubble in the urinary bladder  Correlate with urinalysis to exclude UTI  Stable appearing bladder diverticulum  Thickened appearance of the distal esophageal wall which might indicate esophagitis versus less likely neoplasm  Correlate for any pain or difficulty swallowing  Further workup with upper endoscopy or esophagram would be helpful  Colonic diverticulosis without diverticulitis  Reason for consult:  R/o aspiration  Determine safest and least restrictive diet  Change in mental status  H/o neurological disease  respiratory compromise  Current diet:  Npo, bt had been drinking water prior to my arrival  Premorbid diet[de-identified]  ? reglar  Previous VBS:  None known  O2 requirement:  nc  Voice/Speech:  No dysphonia but mildly weak  Social:  Alone, apartment  Follows commands:  yes                    Cognitive Status:  Alert in chair  Appears to fatigue easily  Oral mech exam:  Edentulous  Stated dentures are at home and she needs them to chew    Full symmetry  Items administered:  Puree, thin  Oral stage:  [facility] for limited items offered  Need the drink pulled away from her because she was drinking  too fast    Pharyngeal stage:  WFL  No overt s/s  Swallow promptness: prompt  Laryngeal rise:fair/adequate  Wet voice:no  Throat clear:no  Cough:no  Secondary swallows:no  Audible swallows:no  No overt s/s aspiration    Esophageal stage:  After a minimal amount of applesauce she stated "that's it"  I asked if she ever feels anything coming back up  She stated "ya, that's why I have to stop"  Per CT abd and pelvis: Thickened appearance of the distal esophageal wall which might indicate esophagitis versus less likely neoplasm  Correlate for any pain or difficulty swallowing  Further workup with upper endoscopy or esophagram would be helpful  Colonic diverticulosis without diverticulitis  Summary:  Pt presented w/ no s/s aspiration or decline in o2 sats w/ thin liquids or small amount of applesauce  Dentures are unavailable  Pt stated she needs them to chew  See above "esophageal stage"    Recommendations:  Diet: puree for now  Liquid:thin  Meds:as tolerated  Positioning:Upright  Strategies: slow rate w/ liquids, stop feeding if "full"  Aspiration precautions  Reflux precautions    Aspiration precautions posted    Results d/w:  Pt, family, nurse, crnp, physician    Consider consult w/:  GI    Goal(s):  Pt will tolerate least restrictive diet w/out s/s aspiration or oral/pharyngeal difficulties

## 2018-01-11 NOTE — SOCIAL WORK
CM met with the patient to do a general open; the patient lives in a [facility] apartment  She has a flight of steps to enter  She does not use any DME for ambulatory needs  She is independent with adls  Her friend who lives in the building does all of her grocery shopping  She can not remember the last time she has been to her PCP office or the last time she has filled any of her medications or gone to her pharmacy, which she could not name  No hx of VNA or STr services in the past  CM left Vm for the patients sister, requesting a return call to discuss discharge planning

## 2018-01-12 LAB
ANION GAP SERPL CALCULATED.3IONS-SCNC: 11 MMOL/L (ref 4–13)
ANION GAP SERPL CALCULATED.3IONS-SCNC: 11 MMOL/L (ref 4–13)
BACTERIA BLD CULT: NORMAL
BUN SERPL-MCNC: 39 MG/DL (ref 5–25)
BUN SERPL-MCNC: 42 MG/DL (ref 5–25)
CALCIUM SERPL-MCNC: 7.7 MG/DL (ref 8.3–10.1)
CALCIUM SERPL-MCNC: 7.8 MG/DL (ref 8.3–10.1)
CHLORIDE SERPL-SCNC: 100 MMOL/L (ref 100–108)
CHLORIDE SERPL-SCNC: 100 MMOL/L (ref 100–108)
CO2 SERPL-SCNC: 30 MMOL/L (ref 21–32)
CO2 SERPL-SCNC: 30 MMOL/L (ref 21–32)
CREAT SERPL-MCNC: 1.3 MG/DL (ref 0.6–1.3)
CREAT SERPL-MCNC: 1.41 MG/DL (ref 0.6–1.3)
GFR SERPL CREATININE-BSD FRML MDRD: 35 ML/MIN/1.73SQ M
GFR SERPL CREATININE-BSD FRML MDRD: 39 ML/MIN/1.73SQ M
GLUCOSE SERPL-MCNC: 102 MG/DL (ref 65–140)
GLUCOSE SERPL-MCNC: 105 MG/DL (ref 65–140)
GLUCOSE SERPL-MCNC: 113 MG/DL (ref 65–140)
GLUCOSE SERPL-MCNC: 116 MG/DL (ref 65–140)
GLUCOSE SERPL-MCNC: 142 MG/DL (ref 65–140)
GLUCOSE SERPL-MCNC: 146 MG/DL (ref 65–140)
POTASSIUM SERPL-SCNC: 3.3 MMOL/L (ref 3.5–5.3)
POTASSIUM SERPL-SCNC: 3.5 MMOL/L (ref 3.5–5.3)
SODIUM SERPL-SCNC: 141 MMOL/L (ref 136–145)
SODIUM SERPL-SCNC: 141 MMOL/L (ref 136–145)

## 2018-01-12 PROCEDURE — 82948 REAGENT STRIP/BLOOD GLUCOSE: CPT

## 2018-01-12 PROCEDURE — 80048 BASIC METABOLIC PNL TOTAL CA: CPT | Performed by: NURSE PRACTITIONER

## 2018-01-12 PROCEDURE — 80048 BASIC METABOLIC PNL TOTAL CA: CPT | Performed by: HOSPITALIST

## 2018-01-12 PROCEDURE — 94668 MNPJ CHEST WALL SBSQ: CPT

## 2018-01-12 PROCEDURE — 92526 ORAL FUNCTION THERAPY: CPT

## 2018-01-12 RX ORDER — GUAIFENESIN 100 MG/5ML
200 SOLUTION ORAL EVERY 4 HOURS
Status: DISCONTINUED | OUTPATIENT
Start: 2018-01-12 | End: 2018-01-18

## 2018-01-12 RX ORDER — POTASSIUM CHLORIDE 20MEQ/15ML
40 LIQUID (ML) ORAL ONCE
Status: COMPLETED | OUTPATIENT
Start: 2018-01-12 | End: 2018-01-12

## 2018-01-12 RX ORDER — FUROSEMIDE 10 MG/ML
40 INJECTION INTRAMUSCULAR; INTRAVENOUS ONCE
Status: COMPLETED | OUTPATIENT
Start: 2018-01-12 | End: 2018-01-12

## 2018-01-12 RX ORDER — POTASSIUM CHLORIDE 20 MEQ/1
20 TABLET, EXTENDED RELEASE ORAL ONCE
Status: COMPLETED | OUTPATIENT
Start: 2018-01-12 | End: 2018-01-12

## 2018-01-12 RX ADMIN — POTASSIUM CHLORIDE 40 MEQ: 20 SOLUTION ORAL at 13:58

## 2018-01-12 RX ADMIN — GUAIFENESIN 200 MG: 100 SOLUTION ORAL at 21:15

## 2018-01-12 RX ADMIN — POTASSIUM CHLORIDE 20 MEQ: 1500 TABLET, EXTENDED RELEASE ORAL at 18:52

## 2018-01-12 RX ADMIN — HEPARIN SODIUM 5000 UNITS: 5000 INJECTION, SOLUTION INTRAVENOUS; SUBCUTANEOUS at 21:15

## 2018-01-12 RX ADMIN — Medication 1 TABLET: at 21:15

## 2018-01-12 RX ADMIN — GUAIFENESIN 200 MG: 100 SOLUTION ORAL at 11:50

## 2018-01-12 RX ADMIN — FUROSEMIDE 40 MG: 10 INJECTION, SOLUTION INTRAMUSCULAR; INTRAVENOUS at 11:51

## 2018-01-12 RX ADMIN — GUAIFENESIN 200 MG: 100 SOLUTION ORAL at 06:19

## 2018-01-12 RX ADMIN — LEVOFLOXACIN 500 MG: 5 INJECTION, SOLUTION INTRAVENOUS at 13:46

## 2018-01-12 RX ADMIN — INSULIN GLARGINE 10 UNITS: 100 INJECTION, SOLUTION SUBCUTANEOUS at 21:15

## 2018-01-12 RX ADMIN — ASPIRIN 81 MG 81 MG: 81 TABLET ORAL at 11:51

## 2018-01-12 RX ADMIN — HEPARIN SODIUM 5000 UNITS: 5000 INJECTION, SOLUTION INTRAVENOUS; SUBCUTANEOUS at 13:45

## 2018-01-12 RX ADMIN — GUAIFENESIN 200 MG: 100 SOLUTION ORAL at 17:35

## 2018-01-12 RX ADMIN — HEPARIN SODIUM 5000 UNITS: 5000 INJECTION, SOLUTION INTRAVENOUS; SUBCUTANEOUS at 06:19

## 2018-01-12 NOTE — SPEECH THERAPY NOTE
Speech Language/Pathology    Speech/Language Pathology Progress Note    Patient Name: Juan WEINSTEIN Date: 1/12/2018     Problem List  Patient Active Problem List   Diagnosis    Encephalopathy    Hypokalemia    Acute kidney injury ([facility] [address] )    Dehydration        Past Medical History  Past Medical History:   Diagnosis Date    Arthritis     Diabetes mellitus ([facility] [address] )     Hypertension     Memory loss         Past Surgical History  History reviewed  No pertinent surgical history  Subjective:  "you are all so nice"  Objective:  Seen for f/u dysphagia tx  Still no dentures available  On puree w/ thin  Poor intake  D/w dietician  Assessment:  Still had her breakfast tray when I arrived  Made her hot chocolate  Stated it was good but didn't want to drink much because she was afraid she would have a bm and the girls would have to clean it  I told her if this happened it wasn't a problem  She still declined  No s/s aspiration  Refused the hot cereal  I offered applesauce  "oh this is good"  But only fed self a few tsps and stated it was enough  Prompt swallow  No s/s aspiration  ? Poor intake due to esophageal thickening vs other  Plan/Recommendations:  ? Supplements  Encourage intake as tolerated ? Endoscopy or esophagram was was suggested following ct abdomen

## 2018-01-12 NOTE — PLAN OF CARE

## 2018-01-12 NOTE — PROGRESS NOTES
Transferred with belongings to room 205-1  Report given to Evan Partida RN  Oriented to room and call bell system

## 2018-01-12 NOTE — PROGRESS NOTES
Progress Note - Pulmonary   Andrew Marinelli 76 y o  female MRN: [id number]  Unit/Bed#: ICU 04 Encounter: [id number]    Assessment/Plan:  1  Acute hypoxic respiratory failure, multifactorial secondary to encephalopathy and severe sepsis  · Wean oxygen as tolerated to maintain saturation >88%  · Continue aggressive pulmonary toileting  Encourage cough and deep breathing  OOB during the day  Increase activity  · Has a weak cough with some trouble mobilizing secretions  Will start on mucinex and add flutter valve  2  Severe sepsis secondary due to Klebsiella UTI and bacteremia  · Continue Levaquin until 1/16 for total of 10 days  3  NSTEMI type 2 in the setting of HHNK and severe sepsis    ----------------------------------------------------------------------------------------------------------------------    HPI/Interval History:   Afebrile  No acute events overnight  Vitals:   Blood pressure 114/55, pulse 88, temperature 98 5 °F (36 9 °C), temperature source Temporal, resp  rate (!) 23, height 5' 6" (1 676 m), weight 77 6 kg (171 lb 1 2 oz), SpO2 94 %  ,Body mass index is 27 61 kg/m²    SpO2: 94 %  SpO2 Activity: At Rest  O2 Device: Nasal cannula    Physical Exam:   Gen: Sleeping but arousable, tearful, weak appearing  HEENT:  Atraumatic, normocephalic, extraocular movements intact, pupils 3 mm equal and reactive, oropharynx clear  Neck:  Supple, trachea midline, no JVD, no lymphadenopathy  Chest:  Slightly diminished with some scattered coarse sounds  Cardiac:  Single S1/S2, no murmurs, rubs, gallops, regular rate and rhythm  Abdomen:  Soft, nontender, nondistended, bowel sounds normoactive  Extremities:  1+ bilateral lower extremity edema      Labs:    CBC:    Results from last 7 days  Lab Units 01/09/18  0507   WBC Thousand/uL 7 87   HEMOGLOBIN g/dL 11 6   HEMATOCRIT % 36 2   PLATELETS Thousands/uL 223         BMP:     Results from last 7 days  Lab Units 01/11/18  0620   SODIUM mmol/L 146*   POTASSIUM mmol/L 3 6   CHLORIDE mmol/L 106   CO2 mmol/L 31   BUN mg/dL 36*   CREATININE mg/dL 1 47*   GLUCOSE RANDOM mg/dL 116   CALCIUM mg/dL 7 7*           Imaging studies:  None STORM Kincaid

## 2018-01-12 NOTE — PROGRESS NOTES
Progress Note - Radha Smith 76 y o  female MRN: [id number]    Unit/Bed#: ICU 04 Encounter: [id number]      Assessment/Plan:  1  Acute hypoxic respiratory failure-requiring endotracheal intubation-successfully extubated on 01/10/2017  likely multifactorial due to encephalopathy, metabolic derangements secondary to [facility][1] and possible UTI  Maintains an oxygen saturation of 94% on 5 liters  itrtate to keep sat > 90%  2  Acute metabolic encephalopathy:improved-secondary to 1 and 5-improved-still confused intermittently  3  Diabetes mellitis with HHNK  1  transition to lantus and SSI-blood sugars are now stable  4  NSTEMI type II secondary to HHNK and UTI-stable      5  Sepsis with klebsiella UTI-stable  Without fever  WBC normal   1  Continue Levaquin for 10 days through 01/16/2018       6  Acute renal failure with unknown baseline creatinine-creatinine in May of 2016 was 0 79  Currently 1 47   1  creatinine remains stable    PT OT  DC planning  Transfer out of ICU      Subjective:  No acute events over night  Patient having difficulty expectorating  On Levaquin for Klebsiella UTI with sepsis  Physical Exam:   Vitals: Blood pressure (!) 108/47, pulse 100, temperature 98 5 °F (36 9 °C), temperature source Temporal, resp  rate 20, height 5' 6" (1 676 m), weight 77 3 kg (170 lb 6 7 oz), SpO2 91 %  ,Body mass index is 27 51 kg/m²  Gen: non-tachypnic, non-dyspnic  Conversant  Intermittently confused  Heart: regular rate and rhythm, S1S2 present, no murmur, rub or gallop  Lungs:  Bibasilar rales  Abd: soft, non-tender, non-distended  NABS, no guarding, rebound or peritoneal signs  Extremities: no clubbing, cyanosis or edema  2+pedal pulses bilaterally  Full range of motion  Neuro: awake, alert and oriented  Cranial nerves 2-12 intact  Strength and sensation grossly intact  Skin: warm and dry: no petechiae, purpura and rash      LABS:     Results from last 7 days  Lab Units 01/09/18  0503 01/07/18  1000   WBC Thousand/uL 7 87 16 54*   HEMOGLOBIN g/dL 11 6 17 9*   HEMATOCRIT % 36 2 49 9*   PLATELETS Thousands/uL 223 590*       Results from last 7 days  Lab Units 01/11/18  0620 01/10/18  0507 01/09/18  0807   SODIUM mmol/L 146* 145 145   POTASSIUM mmol/L 3 6 3 6 3 9   CHLORIDE mmol/L 106 107 109*   CO2 mmol/L 31 29 29   BUN mg/dL 36* 37* 32*   CREATININE mg/dL 1 47* 1 68* 2 07*   GLUCOSE RANDOM mg/dL 116 145* 211*   CALCIUM mg/dL 7 7* 8 4 8 1*       Intake/Output Summary (Last 24 hours) at 01/12/18 0914  Last data filed at 01/11/18 1814   Gross per 24 hour   Intake                0 ml   Output              640 ml   Net             -640 ml           aspirin 81 mg Oral Daily   guaiFENesin 200 mg Oral Q4H   heparin (porcine) 5,000 Units Subcutaneous Q8H [address]   insulin glargine 10 Units Subcutaneous HS   insulin lispro 1-6 Units Subcutaneous TID AC   insulin lispro 1-6 Units Subcutaneous HS   levofloxacin 500 mg Intravenous Q24H   senna-docusate sodium 1 tablet Oral HS       Family update-tried calling Maryann Harrell as sister on the emergency contact    No answer

## 2018-01-13 LAB
ANION GAP SERPL CALCULATED.3IONS-SCNC: 13 MMOL/L (ref 4–13)
BUN SERPL-MCNC: 38 MG/DL (ref 5–25)
CALCIUM SERPL-MCNC: 8 MG/DL (ref 8.3–10.1)
CHLORIDE SERPL-SCNC: 100 MMOL/L (ref 100–108)
CO2 SERPL-SCNC: 27 MMOL/L (ref 21–32)
CREAT SERPL-MCNC: 1.32 MG/DL (ref 0.6–1.3)
GFR SERPL CREATININE-BSD FRML MDRD: 38 ML/MIN/1.73SQ M
GLUCOSE SERPL-MCNC: 115 MG/DL (ref 65–140)
GLUCOSE SERPL-MCNC: 133 MG/DL (ref 65–140)
GLUCOSE SERPL-MCNC: 143 MG/DL (ref 65–140)
GLUCOSE SERPL-MCNC: 143 MG/DL (ref 65–140)
GLUCOSE SERPL-MCNC: 173 MG/DL (ref 65–140)
GLUCOSE SERPL-MCNC: 94 MG/DL (ref 65–140)
POTASSIUM SERPL-SCNC: 3.5 MMOL/L (ref 3.5–5.3)
SODIUM SERPL-SCNC: 140 MMOL/L (ref 136–145)

## 2018-01-13 PROCEDURE — 80048 BASIC METABOLIC PNL TOTAL CA: CPT | Performed by: HOSPITALIST

## 2018-01-13 PROCEDURE — 82948 REAGENT STRIP/BLOOD GLUCOSE: CPT

## 2018-01-13 RX ORDER — OLANZAPINE 5 MG/1
5 TABLET ORAL
Status: DISCONTINUED | OUTPATIENT
Start: 2018-01-13 | End: 2018-01-15

## 2018-01-13 RX ADMIN — GUAIFENESIN 200 MG: 100 SOLUTION ORAL at 22:27

## 2018-01-13 RX ADMIN — GUAIFENESIN 200 MG: 100 SOLUTION ORAL at 09:28

## 2018-01-13 RX ADMIN — HEPARIN SODIUM 5000 UNITS: 5000 INJECTION, SOLUTION INTRAVENOUS; SUBCUTANEOUS at 14:30

## 2018-01-13 RX ADMIN — LEVOFLOXACIN 500 MG: 5 INJECTION, SOLUTION INTRAVENOUS at 13:30

## 2018-01-13 RX ADMIN — GUAIFENESIN 200 MG: 100 SOLUTION ORAL at 14:30

## 2018-01-13 RX ADMIN — GUAIFENESIN 200 MG: 100 SOLUTION ORAL at 05:32

## 2018-01-13 RX ADMIN — GUAIFENESIN 200 MG: 100 SOLUTION ORAL at 16:55

## 2018-01-13 RX ADMIN — GUAIFENESIN 200 MG: 100 SOLUTION ORAL at 01:36

## 2018-01-13 RX ADMIN — INSULIN GLARGINE 10 UNITS: 100 INJECTION, SOLUTION SUBCUTANEOUS at 22:37

## 2018-01-13 RX ADMIN — HEPARIN SODIUM 5000 UNITS: 5000 INJECTION, SOLUTION INTRAVENOUS; SUBCUTANEOUS at 22:27

## 2018-01-13 RX ADMIN — HEPARIN SODIUM 5000 UNITS: 5000 INJECTION, SOLUTION INTRAVENOUS; SUBCUTANEOUS at 05:32

## 2018-01-13 RX ADMIN — ASPIRIN 81 MG 81 MG: 81 TABLET ORAL at 09:28

## 2018-01-13 NOTE — PROGRESS NOTES
Progress Note - Naomi Rodriguez 76 y o  female MRN: [id number]    Unit/Bed#: [facility] 2 -01 Encounter: [id number]      Assessment/Plan:  1  Acute hypoxic respiratory failure-requiring endotracheal intubation-successfully extubated on 01/10/2017  likely multifactorial due to encephalopathy, metabolic derangements secondary to [facility] and possible UTI  Currently saturating at 92% on room air         2  Acute metabolic encephalopathy:improved-secondary to 1 and 7-[id number] is back to baseline        3  Diabetes mellitis with HHNK  1  On lantus 10 units at night and SSI-blood sugars are now stable      4  NSTEMI type II secondary to HHNK and UTI-stable        5  Sepsis with klebsiella UTI-stable  Without fever  WBC normal   1  Continue Levaquin for 10 days through 01/16/2018         6  Acute renal failure with unknown baseline creatinine-creatinine in May of 2016 was 0 79  Currently 1 32   1  creatinine remains stable     PT OT  DC planning  Patient will need long-term placement  Subjective:  Patient awake alert and verbal   Wants ice cream for breakfast   Also wanted me to notify her sister to come and visit her-she cannot remember her contact number  She apparently lives in [place]  There was another sister Richie Mcpherson was listed on our contact number who passed away 3 weeks ago  Patient will need long-term placement  She lives alone  Physical Exam:   Vitals: Blood pressure 115/61, pulse 91, temperature (!) 97 3 °F (36 3 °C), temperature source Tympanic, resp  rate 19, height 5' 6" (1 676 m), weight 77 3 kg (170 lb 6 7 oz), SpO2 92 %  ,Body mass index is 27 51 kg/m²  Gen:  Pleasant, non-tachypnic, non-dyspnic  Conversant  Heart: regular rate and rhythm, S1S2 present, no murmur, rub or gallop  Lungs: clear to ausculatation bilaterally  No wheezing, crackless, or rhonchi  No accessory muscle use or respiratory distress  Abd: soft, non-tender, non-distended   NABS, no guarding, rebound or peritoneal signs  Extremities: no clubbing, cyanosis or edema  2+pedal pulses bilaterally  Full range of motion  Neuro: awake, alert and oriented  Cranial nerves 2-12 intact  Strength and sensation grossly intact  Skin: warm and dry: no petechiae, purpura and rash      LABS:     Results from last 7 days  Lab Units 01/09/18  0507 01/07/18  1000   WBC Thousand/uL 7 87 16 54*   HEMOGLOBIN g/dL 11 6 17 9*   HEMATOCRIT % 36 2 49 9*   PLATELETS Thousands/uL 223 590*       Results from last 7 days  Lab Units 01/13/18  0510 01/12/18  1932 01/12/18  0926   SODIUM mmol/L 140 141 141   POTASSIUM mmol/L 3 5 3 5 3 3*   CHLORIDE mmol/L 100 100 100   CO2 mmol/L 27 30 30   BUN mg/dL 38* 39* 42*   CREATININE mg/dL 1 32* 1 41* 1 30   GLUCOSE RANDOM mg/dL 143* 146* 113   CALCIUM mg/dL 8 0* 7 8* 7 7*       Intake/Output Summary (Last 24 hours) at 01/13/18 0905  Last data filed at 01/13/18 0601   Gross per 24 hour   Intake              220 ml   Output              597 ml   Net             -377 ml           aspirin 81 mg Oral Daily   guaiFENesin 200 mg Oral Q4H   heparin (porcine) 5,000 Units Subcutaneous Q8H [address]   insulin glargine 10 Units Subcutaneous HS   insulin lispro 1-6 Units Subcutaneous TID AC   insulin lispro 1-6 Units Subcutaneous HS   levofloxacin 500 mg Intravenous Q24H   senna-docusate sodium 1 tablet Oral HS       Family update-will try and update sister once I get a contact number

## 2018-01-14 LAB
ANION GAP SERPL CALCULATED.3IONS-SCNC: 11 MMOL/L (ref 4–13)
BACTERIA BLD CULT: NORMAL
BACTERIA BLD CULT: NORMAL
BUN SERPL-MCNC: 27 MG/DL (ref 5–25)
CALCIUM SERPL-MCNC: 8.2 MG/DL (ref 8.3–10.1)
CHLORIDE SERPL-SCNC: 99 MMOL/L (ref 100–108)
CO2 SERPL-SCNC: 27 MMOL/L (ref 21–32)
CREAT SERPL-MCNC: 1.13 MG/DL (ref 0.6–1.3)
GFR SERPL CREATININE-BSD FRML MDRD: 46 ML/MIN/1.73SQ M
GLUCOSE SERPL-MCNC: 135 MG/DL (ref 65–140)
GLUCOSE SERPL-MCNC: 153 MG/DL (ref 65–140)
GLUCOSE SERPL-MCNC: 192 MG/DL (ref 65–140)
GLUCOSE SERPL-MCNC: 221 MG/DL (ref 65–140)
GLUCOSE SERPL-MCNC: 246 MG/DL (ref 65–140)
POTASSIUM SERPL-SCNC: 3.1 MMOL/L (ref 3.5–5.3)
SODIUM SERPL-SCNC: 137 MMOL/L (ref 136–145)

## 2018-01-14 PROCEDURE — 82948 REAGENT STRIP/BLOOD GLUCOSE: CPT

## 2018-01-14 PROCEDURE — 80048 BASIC METABOLIC PNL TOTAL CA: CPT | Performed by: HOSPITALIST

## 2018-01-14 PROCEDURE — 92526 ORAL FUNCTION THERAPY: CPT

## 2018-01-14 RX ORDER — POTASSIUM CHLORIDE 20MEQ/15ML
40 LIQUID (ML) ORAL ONCE
Status: DISCONTINUED | OUTPATIENT
Start: 2018-01-14 | End: 2018-01-14

## 2018-01-14 RX ORDER — POTASSIUM CHLORIDE 20 MEQ/1
20 TABLET, EXTENDED RELEASE ORAL ONCE
Status: COMPLETED | OUTPATIENT
Start: 2018-01-14 | End: 2018-01-14

## 2018-01-14 RX ADMIN — GUAIFENESIN 200 MG: 100 SOLUTION ORAL at 23:03

## 2018-01-14 RX ADMIN — GUAIFENESIN 200 MG: 100 SOLUTION ORAL at 14:30

## 2018-01-14 RX ADMIN — GUAIFENESIN 200 MG: 100 SOLUTION ORAL at 05:56

## 2018-01-14 RX ADMIN — ASPIRIN 81 MG 81 MG: 81 TABLET ORAL at 09:03

## 2018-01-14 RX ADMIN — INSULIN LISPRO 2 UNITS: 100 INJECTION, SOLUTION INTRAVENOUS; SUBCUTANEOUS at 23:02

## 2018-01-14 RX ADMIN — INSULIN GLARGINE 10 UNITS: 100 INJECTION, SOLUTION SUBCUTANEOUS at 23:02

## 2018-01-14 RX ADMIN — OLANZAPINE 5 MG: 5 TABLET, FILM COATED ORAL at 23:03

## 2018-01-14 RX ADMIN — GUAIFENESIN 200 MG: 100 SOLUTION ORAL at 02:49

## 2018-01-14 RX ADMIN — HEPARIN SODIUM 5000 UNITS: 5000 INJECTION, SOLUTION INTRAVENOUS; SUBCUTANEOUS at 23:03

## 2018-01-14 RX ADMIN — INSULIN LISPRO 2 UNITS: 100 INJECTION, SOLUTION INTRAVENOUS; SUBCUTANEOUS at 12:04

## 2018-01-14 RX ADMIN — LEVOFLOXACIN 500 MG: 5 INJECTION, SOLUTION INTRAVENOUS at 14:00

## 2018-01-14 RX ADMIN — INSULIN LISPRO 3 UNITS: 100 INJECTION, SOLUTION INTRAVENOUS; SUBCUTANEOUS at 17:11

## 2018-01-14 RX ADMIN — GUAIFENESIN 200 MG: 100 SOLUTION ORAL at 17:12

## 2018-01-14 RX ADMIN — HEPARIN SODIUM 5000 UNITS: 5000 INJECTION, SOLUTION INTRAVENOUS; SUBCUTANEOUS at 05:56

## 2018-01-14 RX ADMIN — HEPARIN SODIUM 5000 UNITS: 5000 INJECTION, SOLUTION INTRAVENOUS; SUBCUTANEOUS at 15:00

## 2018-01-14 RX ADMIN — POTASSIUM CHLORIDE 20 MEQ: 1500 TABLET, EXTENDED RELEASE ORAL at 15:03

## 2018-01-14 RX ADMIN — GUAIFENESIN 200 MG: 100 SOLUTION ORAL at 09:03

## 2018-01-14 NOTE — SPEECH THERAPY NOTE
Speech Language/Pathology    Speech/Language Pathology Progress Note    Patient Name: Fran Sher  ZUUAY'T Date: 1/14/2018    Subjective:  "I love my ice cream "  Objective:  Asked by nsg to see pt due to poor po intake on current puree diet; pt only eating ice cream   Current diet: Puree with thin liquids  Pt still without dentures  Nsg reports pt does not appear to have family willing to bring in dentures and that pt was disappointed when mac & cheese came up pureed today  Pt given trials of cornflakes softened in milk  Prolonged mastication noted  Pt states, "I can't chew this and if I try to swallow it, I might choke "  Pt spit out corn flakes and declined further soft trials  Observed drinking thin water with no difficulty or overt s/s aspiration  "As long as I have my water and my ice cream, I'll be fine "    Assessment:  Dentures unavailable  Pt unable to tolerate trials of very soft solids due to lacking dentition  Plan/Recommendations:  Continue puree diet with thin liquids  Consider supplements  ST to f/u if/when dentures available

## 2018-01-14 NOTE — PROGRESS NOTES
Progress Note - Mark Seek 76 y o  female MRN: [id number]    Unit/Bed#: [facility] 2 -01 Encounter: [id number]      Assessment/Plan:    1-Acute hypoxic respiratory failure-requiring endotracheal intubation-successfully extubated on 01/10/2017   multifactorial due to UTI with sepsis, fluid overload and  HHNK   This has resolved  Currently saturating at 92% on room air  2-Acute metabolic encephalopathy:improved-secondary to 1 and 6-pvzqiskj-ecllaefqs is back to baseline  3-Diabetes mellitis presenting with with HHNK-this is now resolved  Patient is on lantus 10 units at night and SSI-blood sugars are now stable  4-NSTEMI type II secondary to HHNK and UTI-stable    5-Sepsis with klebsiella UTI-now improved   Without fever   WBC normal Continue Levaquin for 10 days through 01/16/2018  6-Acute renal failure with unknown baseline creatinine-creatinine in May of 2016 was 0 79   Currently 1 13 creatinine remains stable    7-depression-reactive-started patient on low-dose Zyprexa    9-xnygdjljnwh-mfsq replete      Consult Geriatrics   PT OT  DC planning  Patient will need long-term placement  Subjective:  "I am so happy you gave me ice cream--I could have it all day"  Patient's mentation is improving although she does have intermittent episodes of crying spells  Tolerating her antibiotics well  Plan to continue it through 01/16/2018  Physical Exam:   Vitals: Blood pressure 110/61, pulse 87, temperature (!) 97 4 °F (36 3 °C), temperature source Tympanic, resp  rate 19, height 5' 6" (1 676 m), weight 77 3 kg (170 lb 6 7 oz), SpO2 94 %  ,Body mass index is 27 51 kg/m²  Gen:  Pleasant, non-tachypnic, non-dyspnic  Conversant  Heart: regular rate and rhythm, S1S2 present, no murmur, rub or gallop  Lungs: clear to ausculatation bilaterally  No wheezing, crackless, or rhonchi  No accessory muscle use or respiratory distress  Abd: soft, non-tender, non-distended   NABS, no guarding, rebound or peritoneal signs  Extremities: no clubbing, cyanosis or edema  2+pedal pulses bilaterally  Full range of motion  Neuro: awake, alert and oriented  Cranial nerves 2-12 intact  Strength and sensation grossly intact  Skin: warm and dry: no petechiae, purpura and rash      LABS:     Results from last 7 days  Lab Units 01/09/18  0507 01/07/18  1000   WBC Thousand/uL 7 87 16 54*   HEMOGLOBIN g/dL 11 6 17 9*   HEMATOCRIT % 36 2 49 9*   PLATELETS Thousands/uL 223 590*       Results from last 7 days  Lab Units 01/14/18  0544 01/13/18  0510 01/12/18  1932   SODIUM mmol/L 137 140 141   POTASSIUM mmol/L 3 1* 3 5 3 5   CHLORIDE mmol/L 99* 100 100   CO2 mmol/L 27 27 30   BUN mg/dL 27* 38* 39*   CREATININE mg/dL 1 13 1 32* 1 41*   GLUCOSE RANDOM mg/dL 153* 143* 146*   CALCIUM mg/dL 8 2* 8 0* 7 8*     No intake or output data in the 24 hours ending 01/14/18 0847        aspirin 81 mg Oral Daily   guaiFENesin 200 mg Oral Q4H   heparin (porcine) 5,000 Units Subcutaneous Q8H [address]   insulin glargine 10 Units Subcutaneous HS   insulin lispro 1-6 Units Subcutaneous TID AC   insulin lispro 1-6 Units Subcutaneous HS   levofloxacin 500 mg Intravenous Q24H   OLANZapine 5 mg Oral HS   senna-docusate sodium 1 tablet Oral HS       Family update- left message for sister bhumika Arrington Agustin

## 2018-01-15 PROBLEM — R53.81 DEBILITY: Status: ACTIVE | Noted: 2018-01-15

## 2018-01-15 PROBLEM — A41.9 SEPSIS (HCC): Status: ACTIVE | Noted: 2018-01-15

## 2018-01-15 PROBLEM — E88.09 HYPOALBUMINEMIA: Status: ACTIVE | Noted: 2018-01-15

## 2018-01-15 PROBLEM — J96.01 ACUTE RESPIRATORY FAILURE WITH HYPOXIA (HCC): Status: ACTIVE | Noted: 2018-01-15

## 2018-01-15 PROBLEM — Z91.81 RISK FOR FALLS: Status: ACTIVE | Noted: 2018-01-15

## 2018-01-15 LAB
ANION GAP SERPL CALCULATED.3IONS-SCNC: 11 MMOL/L (ref 4–13)
BUN SERPL-MCNC: 17 MG/DL (ref 5–25)
CALCIUM SERPL-MCNC: 8.7 MG/DL (ref 8.3–10.1)
CHLORIDE SERPL-SCNC: 99 MMOL/L (ref 100–108)
CO2 SERPL-SCNC: 28 MMOL/L (ref 21–32)
CREAT SERPL-MCNC: 1.03 MG/DL (ref 0.6–1.3)
GFR SERPL CREATININE-BSD FRML MDRD: 52 ML/MIN/1.73SQ M
GLUCOSE SERPL-MCNC: 155 MG/DL (ref 65–140)
GLUCOSE SERPL-MCNC: 186 MG/DL (ref 65–140)
GLUCOSE SERPL-MCNC: 190 MG/DL (ref 65–140)
GLUCOSE SERPL-MCNC: 213 MG/DL (ref 65–140)
GLUCOSE SERPL-MCNC: 219 MG/DL (ref 65–140)
POTASSIUM SERPL-SCNC: 3.2 MMOL/L (ref 3.5–5.3)
SODIUM SERPL-SCNC: 138 MMOL/L (ref 136–145)

## 2018-01-15 PROCEDURE — 97530 THERAPEUTIC ACTIVITIES: CPT

## 2018-01-15 PROCEDURE — 80048 BASIC METABOLIC PNL TOTAL CA: CPT | Performed by: HOSPITALIST

## 2018-01-15 PROCEDURE — 92526 ORAL FUNCTION THERAPY: CPT

## 2018-01-15 PROCEDURE — 97110 THERAPEUTIC EXERCISES: CPT

## 2018-01-15 PROCEDURE — 82948 REAGENT STRIP/BLOOD GLUCOSE: CPT

## 2018-01-15 PROCEDURE — 97535 SELF CARE MNGMENT TRAINING: CPT

## 2018-01-15 RX ORDER — OLANZAPINE 5 MG/1
2.5 TABLET ORAL
Status: DISCONTINUED | OUTPATIENT
Start: 2018-01-15 | End: 2018-01-17

## 2018-01-15 RX ORDER — POTASSIUM CHLORIDE 20MEQ/15ML
40 LIQUID (ML) ORAL 2 TIMES DAILY
Status: COMPLETED | OUTPATIENT
Start: 2018-01-15 | End: 2018-01-16

## 2018-01-15 RX ORDER — ACETAMINOPHEN 325 MG/1
975 TABLET ORAL EVERY 8 HOURS SCHEDULED
Status: DISCONTINUED | OUTPATIENT
Start: 2018-01-15 | End: 2018-01-24 | Stop reason: HOSPADM

## 2018-01-15 RX ADMIN — HEPARIN SODIUM 5000 UNITS: 5000 INJECTION, SOLUTION INTRAVENOUS; SUBCUTANEOUS at 05:44

## 2018-01-15 RX ADMIN — GUAIFENESIN 200 MG: 100 SOLUTION ORAL at 05:45

## 2018-01-15 RX ADMIN — LEVOFLOXACIN 500 MG: 5 INJECTION, SOLUTION INTRAVENOUS at 13:09

## 2018-01-15 RX ADMIN — GUAIFENESIN 200 MG: 100 SOLUTION ORAL at 14:07

## 2018-01-15 RX ADMIN — INSULIN LISPRO 2 UNITS: 100 INJECTION, SOLUTION INTRAVENOUS; SUBCUTANEOUS at 22:41

## 2018-01-15 RX ADMIN — INSULIN GLARGINE 10 UNITS: 100 INJECTION, SOLUTION SUBCUTANEOUS at 22:41

## 2018-01-15 RX ADMIN — INSULIN LISPRO 2 UNITS: 100 INJECTION, SOLUTION INTRAVENOUS; SUBCUTANEOUS at 12:32

## 2018-01-15 RX ADMIN — INSULIN LISPRO 2 UNITS: 100 INJECTION, SOLUTION INTRAVENOUS; SUBCUTANEOUS at 17:50

## 2018-01-15 RX ADMIN — ACETAMINOPHEN 975 MG: 325 TABLET ORAL at 22:40

## 2018-01-15 RX ADMIN — GUAIFENESIN 200 MG: 100 SOLUTION ORAL at 17:56

## 2018-01-15 RX ADMIN — INSULIN LISPRO 1 UNITS: 100 INJECTION, SOLUTION INTRAVENOUS; SUBCUTANEOUS at 09:19

## 2018-01-15 RX ADMIN — HEPARIN SODIUM 5000 UNITS: 5000 INJECTION, SOLUTION INTRAVENOUS; SUBCUTANEOUS at 22:40

## 2018-01-15 RX ADMIN — HEPARIN SODIUM 5000 UNITS: 5000 INJECTION, SOLUTION INTRAVENOUS; SUBCUTANEOUS at 14:06

## 2018-01-15 RX ADMIN — POTASSIUM CHLORIDE 40 MEQ: 20 SOLUTION ORAL at 17:49

## 2018-01-15 RX ADMIN — Medication 1 TABLET: at 22:40

## 2018-01-15 RX ADMIN — GUAIFENESIN 200 MG: 100 SOLUTION ORAL at 09:19

## 2018-01-15 RX ADMIN — ASPIRIN 81 MG 81 MG: 81 TABLET ORAL at 09:19

## 2018-01-15 RX ADMIN — ACETAMINOPHEN 975 MG: 325 TABLET ORAL at 14:06

## 2018-01-15 RX ADMIN — GUAIFENESIN 200 MG: 100 SOLUTION ORAL at 02:05

## 2018-01-15 RX ADMIN — GUAIFENESIN 200 MG: 100 SOLUTION ORAL at 22:40

## 2018-01-15 NOTE — ASSESSMENT & PLAN NOTE
Secondary to UTI  Patient with 1 of 2 blood cultures positive for Klebsiella  Now improved leukocytes normal patient is afebrile  Repeat cultures thus far negative to date  Patient has been on Levaquin for total of 10 days of antibiotics  Continue Levaquin day 9 of 10

## 2018-01-15 NOTE — ASSESSMENT & PLAN NOTE
Status post ICU stay with intubation extubated on 1/10/17, likely secondary to sepsis, and  fluid overload and HHNK  Pt now on RA w/o wob, lungs are CTA b/l 91-95% on RA

## 2018-01-15 NOTE — CONSULTS
Consultation - Geriatric Medicine   Ainsley Mckenna 76 y o  female MRN: [id number]  Unit/Bed#: [facility] 68 2 -01 Encounter: [id number]      Assessment/Plan     Assessment/Plan:    1  Acute toxic encephalopathy/ delirium -related to present illness,  Cognitive impairment, change in environment, pain  Patient reportedly back to baseline  She is alert and orient x4  However,  her answers were slow and required a lot of concentration  First line treatment is reassure, reorient, redirect  Include family and companionship, avoid restraints if possible  If medications required for patient safety and behaviors that are severe and not redirectable, use lowest dose for shortest period of time  Identify and treat reversible causes of confusion such as pain, urinary retention, constipation, infection  May use Zyprexa 2 5 mg q 8 hrs PRN if pt becomes agitated or combative and reorientation/redirection efforts failed  Monitor QTc- 444   Albumin level 2 4  Recommend check  HgA1C, TSH, T4, Vitamin B12, Folate, Vitamin D      2  Fall- Patient found on the floor, she cannot recall how she got there  Fall precautions  PT/OT reccomend in patient rehab    3  Anxiety/ Depression- OT reported patient states she does not eat  Because she does not want to live  Patient denies depression anxiety or decreased appetite at present time continue to monitor  Patient may benefit from low-dose Zoloft or Remeron  4   Hypoalbuminemia-   Albumin 2 4, Recommend nutrition consult, Ensure supplementation, evaluation of TSH T4     5  Pain control- will add ATC scheduled acetaminophen 975mg po- amongst the older adult population, patients may not be able to communicate or sense pain and may present as agitation or delirium  For more sever pain please consider geriatric pain protocol  Please ensure adequate bowel regimine is in place  6  Constipation- at risk for  constipation, continue to monitor, may need daily colace and  P r n  MiraLax  7  Debility - continue with supportive care, would need rehab s/p hospitalization,  family agreeable to rehab  Mobilize pt  as tolerated to avoid further deconditioning, encourage use of incentive spirometer  Optimize nutritional status,   Albumin 2 4     8  Ambulatory dysfunction -pt does not use assistive device at home,   Continue with pt/ot - rec inpt therapy, agreed  Recommend calcium, check vitamin D level  9  Mild cognitive impairment  Recommend follow up with [address] [place] [address] [facility] for comprehensive cognitive evaluation and support  At this time would recommend consideration for increased care related to  Acute toxic  Encephalopathy and ambulatory dysfunction  Recommend OT complete ACLs for home safety if patient is to dc home  10  Urinary incontinence - monitor pt, report wears a depends for stress incontince,  recommend timed voiding and keep pt clean and dry to avoid skin breakdown     11  Visual impairment -- at baseline visually impaired, wears glasses, ensure patient has readily available     12  Goals of Care-  patient goal is to return to home  She is agreeable to rehab after discharge Support services and evaluation of goals of care discussed  Patient and family would benefit further work up with [facility] to assist with comprehensive evaluation and support services for patient and caregivers  Attempted to call sister, no answer  History of Present Illness   Physician Requesting Consult: Leonie Cullen MD  Reason for Consult / Principal Problem: encephalopathy  Hx and PE limited by: decreased congition  HPI: Andrew Marinelli is a 76y o  year old female who presents with being found on the floor by a neighbor who reportedly checked on her several times a day  She estimates that she was on the floor for about 2 hours  She called EMS patient was brought to [address] [address] ED for evaluation and treatment    She lives alone at home and a senior high-rise apartment  She independent with executive in independent ADLs  She has not followed up with her doctor or been taking her medications at home  She denies issues with appetite sleep pain breathing constipation or urination  Inpatient consult to Gerontology  Consult performed by: Brea Garcia  Consult ordered by: Morgan Thapa          Review of Systems   Constitutional: Negative  HENT: Negative  Eyes: Negative  Respiratory: Negative  Cardiovascular: Negative  Gastrointestinal: Negative  Genitourinary: Negative  Musculoskeletal: Negative  Neurological: Negative  Psychiatric/Behavioral: Negative  Geriatric Conditions: Delirium    Historical Information   Past Medical History:   Diagnosis Date    Arthritis     Diabetes mellitus ([facility] [place] [address] )     Hypertension     Memory loss      History reviewed  No pertinent surgical history  Social History   History   Alcohol use Not on file     History   Drug use: Unknown     History   Smoking Status    Former Smoker   Smokeless Tobacco    Not on file     Family History: non-contributory    Meds/Allergies   all current active meds have been reviewed    Allergies not on file    Objective   No intake or output data in the 24 hours ending 01/15/18 1103  Invasive Devices     Peripheral Intravenous Line            Peripheral IV 01/12/18 Right Hand 2 days                Physical Exam   Constitutional: She is oriented to person, place, and time  She appears well-developed and well-nourished  No distress  Cardiovascular: Regular rhythm  Exam reveals no gallop and no friction rub  No murmur heard  Pulmonary/Chest: Effort normal and breath sounds normal  No respiratory distress  She has no wheezes  She has no rales  Neurological: She is alert and oriented to person, place, and time  Skin: Skin is warm and dry  She is not diaphoretic         Lab Results:   Lab Results   Component Value Date    WBC 7 87 01/09/2018    WBC 12 28 (H) 11/30/2015    RBC 4 06 01/09/2018    RBC 5 05 11/30/2015    HGB 11 6 01/09/2018    HGB 14 6 11/30/2015    HCT 36 2 01/09/2018    HCT 44 1 11/30/2015    MCV 89 01/09/2018    MCV 87 11/30/2015     01/09/2018     11/30/2015     No results found for: RTPCZDFI22  No results found for: TSH, N0JMZCE, FREET4  No components found for: VITAMIND1, 25-DIHYDROXY  Lab Results   Component Value Date     01/15/2018     11/30/2015    K 3 2 (L) 01/15/2018    K 3 9 11/30/2015    CL 99 (L) 01/15/2018     11/30/2015    CO2 28 01/15/2018    CO2 30 0 11/30/2015    ANIONGAP 11 01/15/2018    ANIONGAP 9 11/30/2015    BUN 17 01/15/2018    BUN 10 11/30/2015    CREATININE 1 03 01/15/2018    CREATININE 0 82 11/30/2015    GLUCOSE 155 (H) 01/15/2018    GLUCOSE 162 (H) 11/30/2015    CALCIUM 8 7 01/15/2018    CALCIUM 9 2 11/30/2015    AST 23 01/07/2018    AST 17 11/30/2015    ALT 12 01/07/2018    ALT 19 11/30/2015    ALKPHOS 128 (H) 01/07/2018    ALKPHOS 109 11/30/2015    PROT 7 1 01/07/2018    PROT 7 6 11/30/2015    ALBUMIN 2 4 (L) 01/07/2018    ALBUMIN 3 5 11/30/2015    BILITOT 0 56 01/07/2018    BILITOT 0 90 11/30/2015    EGFR 52 01/15/2018     Lab Results   Component Value Date    COLORU Yellow 01/07/2018    COLORU Yellow 11/30/2015    CLARITYU Clear 01/07/2018    CLARITYU Turbid 11/30/2015    SPECGRAV <=1 005 01/07/2018    SPECGRAV 1 025 11/30/2015    PHUR 5 0 01/07/2018    PHUR 6 0 11/30/2015    LEUKOCYTESUR Trace (A) 01/07/2018    LEUKOCYTESUR Moderate (A) 11/30/2015    NITRITE Negative 01/07/2018    NITRITE Positive (A) 11/30/2015    PROTEINUA Negative 01/07/2018    PROTEINUA Trace (A) 11/30/2015    GLUCOSEU >=1000 (1%) (A) 01/07/2018    GLUCOSEU Negative 11/30/2015    KETONESU Negative 01/07/2018    KETONESU Negative 11/30/2015    BILIRUBINUR Negative 01/07/2018    BILIRUBINUR Negative 11/30/2015    BLOODU Moderate (A) 01/07/2018    BLOODU Small (A) 11/30/2015     Lab Results Component Value Date    ALBUMIN 2 4 (L) 01/07/2018    ALBUMIN 3 5 11/30/2015     Lab Results   Component Value Date    INR 1 22 (H) 01/07/2018    INR 1 00 11/30/2015     Lab Results   Component Value Date    BLOODCX No Growth After 5 Days  01/09/2018    BLOODCX No Growth After 5 Days  01/09/2018     Lab Results   Component Value Date    URINECX 50,000-59,000 cfu/ml Klebsiella pneumoniae (A) 01/07/2018       Imaging Studies:     CT: PARENCHYMA:  Decreased attenuation is noted in the supratentorial white matter demonstrating an appearance most consistent with moderate microangiopathic change      No intracranial mass, mass effect or midline shift  No CT signs of acute infarction  There is no parenchymal hemorrhage  Atherosclerotic vascular calcifications are noted  EKG, Pathology, and Other Studies:   EKG:       Therapies:   PT/OT:   Recommend inpatient rehab    VTE Prophylaxis: Sequential compression device Suri Taylor)     Code Status: Level 3 - DNAR and DNI  Advance Directive and Living Will:      Power of :    POLST:      Family and Social Support:   No Data Recorded    Goals of Care: to return home    Counseling / Coordination of Care  Total floor / unit time spent today 35 minutes  Greater than 50% of total time was spent with the patient and / or family counseling and / or coordination of care   A description of the counseling / coordination of care: geriatric consult

## 2018-01-15 NOTE — SOCIAL WORK
CM called to discuss rehab options with patient's sister, but had to [facility]  List left with patient

## 2018-01-15 NOTE — PLAN OF CARE
Problem: OCCUPATIONAL THERAPY ADULT  Goal: Performs self-care activities at highest level of function for planned discharge setting  See evaluation for individualized goals  Treatment Interventions: ADL retraining, Functional transfer training, UE strengthening/ROM, Endurance training, Cognitive reorientation, Patient/family training, Equipment evaluation/education, Compensatory technique education, Continued evaluation          See flowsheet documentation for full assessment, interventions and recommendations  Outcome: Progressing  Limitation: Decreased ADL status, Decreased UE strength, Decreased Safe judgement during ADL, Decreased cognition, Decreased endurance, Decreased high-level ADLs  Prognosis: Fair  Assessment: Pt was seen for skilled OT with focus on completion of UE self care tasks, completion of functional transfers, review of leisure interests and review of current plan of care  pt with limited activity tolerance noted this tx session  Moderate encouragement and emotional support required due to flat affect and lack of engagement noted  Pt responded positively this tx session with music being played during functional tasks  See above levels of A required for all functional tasks  Pt will benefit from further rehab with focus on achieving optimal performance levels with all functional tasks due to noted deficits        OT Discharge Recommendation: Short Term Rehab         Comments: Veto Ferrell, [address]

## 2018-01-15 NOTE — SPEECH THERAPY NOTE
Speech Language/Pathology    Speech/Language Pathology Progress Note    Patient Name: Sumit Infante  BXEGA'T Date: 1/15/2018     Problem List  Patient Active Problem List   Diagnosis    Encephalopathy    Hypokalemia    [facility] ([facility] [address]    Dehydration    Hypoalbuminemia    Risk for falls    Debility    Sepsis ([facility] [address] [facility]    Acute respiratory failure with hypoxia [facility])        Past Medical History  Past Medical History:   Diagnosis Date    Arthritis     Diabetes mellitus ([facility] [address] [facility]     Hypertension     Memory loss         Past Surgical History  History reviewed  No pertinent surgical history  Subjective:  Pt alert and pleasant, confused  "my books are under my bed" )friend brought in word-search book)  Objective:  Pt seen for f/u dysphagia tx  Intake remains poor  Spoke w/ dietician who ordered what patient wanted for lunch, however reduced intake  I spoke w/ friend who stated she will bring her dentures in tomorrow  Assessment:  Eats mostly  vanilla ice cream every meal  Also had her OJ today  No s/s aspiration  Refused the boost pudding  Friend got her to take one tsp of the pureed fruit "that's enough" and refused more  She also refused to drink any more  No s/s aspiration  Plan/Recommendations: Will trial upgrade when friend brings in dentures but I question if intake will improve  ? Esophageal/GI  Per CT abd/pelvis 1/7: Thickened appearance of the distal esophageal wall which might indicate esophagitis versus less likely neoplasm  Correlate for any pain or difficulty swallowing  Further workup with upper endoscopy or esophagram would be helpful

## 2018-01-15 NOTE — OCCUPATIONAL THERAPY NOTE
Occupational Therapy Treatment Note:         01/15/18 1442   Restrictions/Precautions   Weight Bearing Precautions Per Order No   Other Precautions Fall Risk;Pain;Cognitive; Chair Alarm; Bed Alarm   Pain Assessment   Pain Assessment No/denies pain   Pain Score No Pain   ADL   Where Assessed Supine, bed  (bed positioned as chair  )   Grooming Assistance 4  Minimal Assistance   Grooming Deficit Setup;Verbal cueing;Supervision/safety; Increased time to complete;Wash/dry hands; Wash/dry face;Brushing hair   Grooming Comments Limited activity tolerance noted with sustained useage of BUE for application of shampoo cap and towel drying hair  LB Bathing Assistance 2  Maximal Assistance   LB Bathing Deficit Setup   LB Bathing Comments cues for upright positioning with activity  Functional Standing Tolerance   Time 1 min   Activity SPT   Comments Pt with increased fatigue with activity warranting return to supine positioning  Bed Mobility   Sit to Supine 4  Minimal assistance   Additional items Assist x 2;Bedrails; Increased time required;Verbal cues;LE management   Additional Comments Pt with need for increased Pt due to noted weakness  Transfers   Sit to Stand 4  Minimal assistance   Additional items Assist x 1   Stand to Sit 4  Minimal assistance   Additional items Assist x 1   Stand pivot 3  Moderate assistance   Additional items Assist x 1; Increased time required;Verbal cues;Armrests; Bedrails   Additional Comments Increased fatigue noted with activity      Functional Mobility   Functional Mobility 4  Minimal assistance   Additional Comments x2   Additional items Rolling walker   Cognition   Overall Cognitive Status Impaired   Arousal/Participation Alert   Attention Attends with cues to redirect   Orientation Level Oriented to person;Oriented to place   Memory Decreased short term memory;Decreased recall of precautions   Following Commands Follows one step commands with increased time or repetition   Comments Pt with need for emotional support and reassurance  Responded positively to singing old songs and with her favorite music played  Cognition Assessment Tools Other (Comment)  (functional observation  )   Additional Activities   Additional Activities (reviewed current plan of care  )   Additional Activities Comments Pt reports F understanding  Activity Tolerance   Activity Tolerance Patient limited by fatigue   Medical Staff Made Aware reviewed energy conservation/compensatory breathing techs  Assessment   Assessment Pt was seen for skilled OT with focus on completion of UE self care tasks, completion of functional transfers, review of leisure interests and review of current plan of care  pt with limited activity tolerance noted this tx session  Moderate encouragement and emotional support required due to flat affect and lack of engagement noted  Pt responded positively this tx session with music being played during functional tasks  See above levels of A required for all functional tasks  Pt will benefit from further rehab with focus on achieving optimal performance levels with all functional tasks due to noted deficits  Plan   Treatment Interventions ADL retraining;Functional transfer training; Endurance training;Cognitive reorientation;UE strengthening/ROM   Goal Expiration Date 01/22/18   Treatment Day 1   OT Frequency 3-5x/wk   Recommendation   OT Discharge Recommendation Short Term Rehab   Barthel Index   Feeding 5   Bathing 0   Grooming Score 0   Dressing Score 5   Bladder Score 5   Bowels Score 5   Toilet Use Score 0   Transfers (Bed/Chair) Score 5   Mobility (Level Surface) Score 0   Stairs Score 0   Barthel Index Score 25   Modified [place] Scale   Modified Arthur Scale 4        01/15/18 1442   Restrictions/Precautions   Weight Bearing Precautions Per Order No   Other Precautions Fall Risk;Pain;Cognitive; Chair Alarm; Bed Alarm   Pain Assessment   Pain Assessment No/denies pain   Pain Score No Pain ADL   Where Assessed Supine, bed  (bed positioned as chair  )   Grooming Assistance 4  Minimal Assistance   Grooming Deficit Setup;Verbal cueing;Supervision/safety; Increased time to complete;Wash/dry hands; Wash/dry face;Brushing hair   Grooming Comments Limited activity tolerance noted with sustained useage of BUE for application of shampoo cap and towel drying hair  LB Bathing Assistance 2  Maximal Assistance   LB Bathing Deficit Setup   LB Bathing Comments cues for upright positioning with activity  Functional Standing Tolerance   Time 1 min   Activity SPT   Comments Pt with increased fatigue with activity warranting return to supine positioning  Bed Mobility   Sit to Supine 4  Minimal assistance   Additional items Assist x 2;Bedrails; Increased time required;Verbal cues;LE management   Additional Comments Pt with need for increased Pt due to noted weakness  Transfers   Sit to Stand 4  Minimal assistance   Additional items Assist x 1   Stand to Sit 4  Minimal assistance   Additional items Assist x 1   Stand pivot 3  Moderate assistance   Additional items Assist x 1; Increased time required;Verbal cues;Armrests; Bedrails   Additional Comments Increased fatigue noted with activity  Functional Mobility   Functional Mobility 4  Minimal assistance   Additional Comments x2   Additional items Rolling walker   Cognition   Overall Cognitive Status Impaired   Arousal/Participation Alert   Attention Attends with cues to redirect   Orientation Level Oriented to person;Oriented to place   Memory Decreased short term memory;Decreased recall of precautions   Following Commands Follows one step commands with increased time or repetition   Comments Pt with need for emotional support and reassurance  Responded positively to singing old songs and with her favorite music played      Cognition Assessment Tools Other (Comment)  (functional observation  )   Additional Activities   Additional Activities (reviewed current plan of care  )   Additional Activities Comments Pt reports F understanding  Activity Tolerance   Activity Tolerance Patient limited by fatigue   Medical Staff Made Aware reviewed energy conservation/compensatory breathing techs  Assessment   Assessment Pt was seen for skilled OT with focus on completion of UE self care tasks, completion of functional transfers, review of leisure interests and review of current plan of care  pt with limited activity tolerance noted this tx session  Moderate encouragement and emotional support required due to flat affect and lack of engagement noted  Pt responded positively this tx session with music being played during functional tasks  See above levels of A required for all functional tasks  Pt will benefit from further rehab with focus on achieving optimal performance levels with all functional tasks due to noted deficits  Plan   Treatment Interventions ADL retraining;Functional transfer training; Endurance training;Cognitive reorientation;UE strengthening/ROM   Goal Expiration Date 01/22/18   Treatment Day 1   OT Frequency 3-5x/wk   Recommendation   OT Discharge Recommendation Short Term Rehab   Barthel Index   Feeding 5   Bathing 0   Grooming Score 0   Dressing Score 5   Bladder Score 5   Bowels Score 5   Toilet Use Score 0   Transfers (Bed/Chair) Score 5   Mobility (Level Surface) Score 0   Stairs Score 0   Barthel Index Score 25   Modified [place] Scale   Modified [place] Scale 4   Vianey Meier, [address]

## 2018-01-15 NOTE — ASSESSMENT & PLAN NOTE
With concern for FTT  Pt's sister recently passed away, current contact is Hadley Tran  Will need STR and likely long term care as there is high concern for being unable to take care of herself

## 2018-01-15 NOTE — PHYSICAL THERAPY NOTE
PHYSICAL THERAPY NOTE          Patient Name: Homero Philip  PMFDN'L Date: 1/15/2018     01/15/18 8991   Pain Assessment   Pain Assessment No/denies pain   Restrictions/Precautions   Weight Bearing Precautions Per Order No   Other Precautions Fall Risk;Telemetry;Cognitive   General   Chart Reviewed Yes   Response to Previous Treatment Patient with no complaints from previous session  Family/Caregiver Present No   Subjective   Subjective OOB on BSC  Tired and wants to go back to bed  Bed Mobility   Sit to Supine 4  Minimal assistance   Additional items Assist x 2; Increased time required;LE management;Verbal cues; Bedrails   Transfers   Sit to Stand 4  Minimal assistance   Additional items Assist x 1; Increased time required;Verbal cues  (cues for hand placement, increaesd time to complete )   Stand to Sit 4  Minimal assistance   Additional items Assist x 1; Increased time required;Verbal cues  (cues for hand placement )   Stand pivot 3  Moderate assistance   Additional items Assist x 1; Increased time required;Verbal cues  (with RW)   Ambulation/Elevation   Gait pattern Improper Weight shift; Forward Flexion;Decreased foot clearance;Decreased R stance;Decreased L stance; Inconsistent prateek; Foward flexed; Excessively slow; Short stride; Shuffling   Gait Assistance 3  Moderate assist   Additional items Assist x 1   Assistive Device Rolling walker   Distance 3'x1 from [facility] to bed  Sats on RA 92%   Balance   Static Sitting Fair +   Dynamic Sitting Fair -   Static Standing Fair -   Dynamic Standing Poor   Ambulatory Poor   Endurance Deficit   Endurance Deficit Yes   Endurance Deficit Description Fatigue, BARBOZA  RA O2 sat 92%,      Activity Tolerance   Activity Tolerance Patient limited by fatigue   Medical Staff [address] for PT to see  Exercises   Quad Sets Supine;10 reps;AROM; Bilateral  (tactile cues )   Heelslides Supine;10 reps;AAROM; Bilateral   Hip Flexion Supine;10 reps;AROM; Bilateral  (SLR)   Hip Abduction Supine;10 reps;AAROM; Bilateral   Hip Adduction Supine;10 reps;AAROM; Bilateral   Ankle Pumps Supine;10 reps;AROM; Bilateral  (with tactile cues )   Assessment   Prognosis Fair   Problem List Decreased strength;Decreased range of motion; Impaired balance;Decreased endurance;Decreased mobility; Decreased safety awareness;Orthopedic restrictions   Assessment Pt demonstrating improvements in mobilty  Now requires min to modA of 1  Cues needed for hand placement with transitions and requires increased cues with use of RW  Cues needed for posture with RW, to stay within ZHANNA of RW  Tires with little mobilty  Able to progress with some LE ther ex  A/AAROM  Cues to improve effort and quality  , O2 sats 92%,   Given continued impairments in strength, balance,activity tolerance and mobitly will cont to require STR at d/c given lives alone  Goals   Patient Goals to rest    STG Expiration Date 01/21/18   Treatment Day 1   Plan   Treatment/Interventions Functional transfer training;LE strengthening/ROM; Therapeutic exercise; Endurance training;Patient/family training;Equipment eval/education;Gait training;Bed mobility; Compensatory technique education;Continued evaluation;Spoke to nursing;OT   Progress Slow progress, decreased activity tolerance   PT Frequency 5x/wk   Recommendation   Recommendation Short-term skilled PT;Post acute IP rehab   Equipment Recommended Walker   PT - OK to Discharge Yes  (to rhb when medically stable, no to home)   Medina Navarrete, PT

## 2018-01-15 NOTE — PLAN OF CARE
Problem: PHYSICAL THERAPY ADULT  Goal: Performs mobility at highest level of function for planned discharge setting  See evaluation for individualized goals  Treatment/Interventions: Functional transfer training, LE strengthening/ROM, Therapeutic exercise, Endurance training, Patient/family training, Equipment eval/education, Bed mobility, Gait training, Compensatory technique education, Continued evaluation, Spoke to nursing, OT, Spoke to case management  Equipment Recommended: Shyanne Mcknight       See flowsheet documentation for full assessment, interventions and recommendations  Outcome: Progressing  Prognosis: Fair  Problem List: Decreased strength, Decreased range of motion, Impaired balance, Decreased endurance, Decreased mobility, Decreased safety awareness, Orthopedic restrictions  Assessment: Pt demonstrating improvements in mobilty  Now requires min to modA of 1  Cues needed for hand placement with transitions and requires increased cues with use of RW  Cues needed for posture with RW, to stay within ZHANNA of RW  Tires with little mobilty  Able to progress with some LE ther ex  A/AAROM  Cues to improve effort and quality  , O2 sats 92%,   Given continued impairments in strength, balance,activity tolerance and mobitly will cont to require STR at d/c given lives alone  Recommendation: Short-term skilled PT, Post acute IP rehab     PT - OK to Discharge: Yes    See flowsheet documentation for full assessment

## 2018-01-15 NOTE — CASE MANAGEMENT
Continued Stay Review    Date:  1/13/2018    Vital Signs: Vitals: Blood pressure 115/61, pulse 91, temperature (!) 97 3 °F (36 3 °C), temperature source Tympanic, resp  rate 19, height 5' 6" (1 676 m), weight 77 3 kg (170 lb 6 7 oz), SpO2 92 %  ,Body mass index is 27 51 kg/m²  Medications:   Scheduled Meds:   Acetaminophen ( started 1/15)  975 mg Oral Q8H [address]   aspirin 81 mg Oral Daily   guaiFENesin 200 mg Oral Q4H   heparin (porcine) 5,000 Units Subcutaneous Q8H [address]   insulin glargine 10 Units Subcutaneous HS   insulin lispro 1-6 Units Subcutaneous TID AC   insulin lispro 1-6 Units Subcutaneous HS   levofloxacin 500 mg Intravenous Q24H   potassium chloride 40 mEq Oral BID   senna-docusate sodium 1 tablet Oral HS     Continuous Infusions:    PRN Meds: OLANZapine    Abnormal Labs/Diagnostic Results:  Bun 38,   Cr 1 32,   Glu 143,   Ca 8 0     Age/Sex: 76 y o  female     1  Assessment/Plan: Acute hypoxic respiratory failure-requiring endotracheal intubation-successfully extubated on 01/10/2017   likely multifactorial due to encephalopathy, metabolic derangements secondary to [facility] and possible UTI  Currently saturating at 92% on room air         2  Acute metabolic encephalopathy:improved-secondary to 1 and 6-vhisswue-cbclaqleg is back to baseline        3  Diabetes mellitis with HHNK  1  On lantus 10 units at night and SSI-blood sugars are now stable      4  NSTEMI type II secondary to HHNK and UTI-stable        5  Sepsis with klebsiella UTI-stable   Without fever   WBC normal   1  Continue Levaquin for 10 days through 01/16/2018         6  Acute renal failure with unknown baseline creatinine-creatinine in May of 2016 was 0 79   Currently 1 32   1  creatinine remains stable     PT OT  DC planning  Patient will need long-term placement      Discharge Plan: See above      Thank you,  [facility] in the [facility] by Vel Dempsey for 2017  Network Utilization Review Department  Phone: [phone]; Fax [phone]  ATTENTION: The Network Utilization Review Department is now centralized for our 7 Facilities  Make a note that we have a new phone and fax numbers for our Department  Please call with any questions or concerns to [phone] and carefully follow the prompts so that you are directed to the right person  All voicemails are confidential  Fax any determinations, approvals, denials, and requests for initial or continue stay review clinical to [phone]  Due to HIGH CALL volume, it would be easier if you could please send faxed requests to expedite your requests and in part, help us provide discharge notifications faster

## 2018-01-15 NOTE — ASSESSMENT & PLAN NOTE
Multifactorial, likely toxic encephalopathy, likely secondary to acute fluid overload in UTI, a KI  Now resolved  Continue to reorient, continue to monitor  Appreciate geriatric consult  Pt [facility]

## 2018-01-15 NOTE — PROGRESS NOTES
Progress Note - Vianey Austin 1940, 76 y o  female MRN: [id number]    Unit/Bed#: [place] [address] 2 -01 Encounter: [id number]    Primary Care Provider: Billy Donato MD   Date and time admitted to hospital: 1/7/2018  9:08 AM        Acute respiratory failure with hypoxia [facility])   Assessment & Plan    Status post ICU stay with intubation extubated on 1/10/17, likely secondary to sepsis, and  fluid overload and HHNK  Pt now on RA w/o wob, lungs are CTA b/l 91-95% on RA        Sepsis ([address] )   Assessment & Plan    Secondary to UTI  Patient with 1 of 2 blood cultures positive for Klebsiella  Now improved leukocytes normal patient is afebrile  Repeat cultures thus far negative to date  Patient has been on Levaquin for total of 10 days of antibiotics  Continue Levaquin day 9 of 10        * Encephalopathy   Assessment & Plan    Multifactorial, likely toxic encephalopathy, likely secondary to acute fluid overload in UTI, a KI  Now resolved  Continue to reorient, continue to monitor  Appreciate geriatric consult  Pt will need OP f/[facility] with [facility] upon d/c        Hypoalbuminemia   Assessment & Plan    With concern for FTT  Pt's sister recently passed away, current contact is Fredis Kindler  Will need STR and likely long term care as there is high concern for being unable to take care of herself        Acute kidney injury [facility])   Assessment & Plan    Unknown baseline appears to be stable          Hypokalemia   Assessment & Plan    Mild, will replete repeat BMP and magnesium in AM          VTE Pharmacologic Prophylaxis:   Pharmacologic: Heparin  Mechanical VTE Prophylaxis in Place: Yes    Patient Centered Rounds: I have performed bedside rounds with nursing staff today  Discussions with Specialists or Other Care Team Provider: left  for CM    Education and Discussions with Family / Patient: will call and update sister by phone, will leave VM if necessary    Time Spent for Care: 20 minutes    More than 50% of total time spent on counseling and coordination of care as described above  Current Length of Stay: 8 day(s)    Current Patient Status: Inpatient   Certification Statement: The patient will continue to require additional inpatient hospital stay due to UTI w/sepsis, encephalopathy requiring long term placement    Discharge Plan:     Code Status: Level 3 - DNAR and DNI      Subjective:   No events overnight per nursing  Patient reports that she has urinary urgency but no chest pain or shortness of breath no lightheadedness or dizziness  She has no nausea vomiting fevers or chills  No diarrhea  She reports she is otherwise comfortable but has been having significant urgency for her urine  Objective:     Vitals:   Temp (24hrs), Av 7 °F (36 5 °C), Min:97 2 °F (36 2 °C), Max:98 5 °F (36 9 °C)    HR:  [85-95] 91  Resp:  [18-19] 19  BP: (101-103)/(53-56) 101/55  SpO2:  [91 %-96 %] 91 %  Body mass index is 27 51 kg/m²  Input and Output Summary (last 24 hours):     No intake or output data in the 24 hours ending 01/15/18 1354    Physical Exam:     Physical Exam   Constitutional: She appears well-developed  No distress  HENT:   Head: Normocephalic and atraumatic  Right Ear: External ear normal    Eyes: Conjunctivae are normal  Right eye exhibits no discharge  Left eye exhibits no discharge  No scleral icterus  Neck: Normal range of motion  Cardiovascular: Normal rate, regular rhythm and normal heart sounds  Exam reveals no gallop and no friction rub  No murmur heard  Pulmonary/Chest: Effort normal and breath sounds normal  No respiratory distress  She has no wheezes  She has no rales  She exhibits no tenderness  Abdominal: Soft  She exhibits no distension  There is no tenderness  There is no rebound and no guarding  Musculoskeletal: Normal range of motion  She exhibits no edema  Neurological: She is alert  Skin: Skin is warm and dry  She is not diaphoretic     Psychiatric: She has a normal mood and affect  Vitals reviewed  Additional Data:     Labs:      Results from last 7 days  Lab Units 01/09/18  0507   WBC Thousand/uL 7 87   HEMOGLOBIN g/dL 11 6   HEMATOCRIT % 36 2   PLATELETS Thousands/uL 223       Results from last 7 days  Lab Units 01/15/18  0447   SODIUM mmol/L 138   POTASSIUM mmol/L 3 2*   CHLORIDE mmol/L 99*   CO2 mmol/L 28   BUN mg/dL 17   CREATININE mg/dL 1 03   CALCIUM mg/dL 8 7   GLUCOSE RANDOM mg/dL 155*           * I Have Reviewed All Lab Data Listed Above  * Additional Pertinent Lab Tests Reviewed: [address] Admission Reviewed    Imaging:    Imaging Reports Reviewed Today Include:   Imaging Personally Reviewed by Myself Includes:      Recent Cultures (last 7 days):       Results from last 7 days  Lab Units 01/09/18  1014   BLOOD CULTURE  No Growth After 5 Days  No Growth After 5 Days  Last 24 Hours Medication List:     acetaminophen 975 mg Oral Q8H [facility]   aspirin 81 mg Oral Daily   guaiFENesin 200 mg Oral Q4H   heparin (porcine) 5,000 Units Subcutaneous Q8H [facility]   insulin glargine 10 Units Subcutaneous HS   insulin lispro 1-6 Units Subcutaneous TID AC   insulin lispro 1-6 Units Subcutaneous HS   levofloxacin 500 mg Intravenous Q24H   potassium chloride 40 mEq Oral BID   senna-docusate sodium 1 tablet Oral HS        Today, Patient Was Seen By: Mayco Ly PA-C    ** Please Note: Dictation voice to text software may have been used in the creation of this document   **

## 2018-01-16 PROBLEM — E11.9 DIABETES (HCC): Status: ACTIVE | Noted: 2018-01-16

## 2018-01-16 PROBLEM — I21.4 NSTEMI (NON-ST ELEVATED MYOCARDIAL INFARCTION) (HCC): Status: ACTIVE | Noted: 2018-01-16

## 2018-01-16 LAB
ANION GAP SERPL CALCULATED.3IONS-SCNC: 13 MMOL/L (ref 4–13)
BUN SERPL-MCNC: 19 MG/DL (ref 5–25)
CALCIUM SERPL-MCNC: 9.1 MG/DL (ref 8.3–10.1)
CHLORIDE SERPL-SCNC: 97 MMOL/L (ref 100–108)
CO2 SERPL-SCNC: 27 MMOL/L (ref 21–32)
CREAT SERPL-MCNC: 1.3 MG/DL (ref 0.6–1.3)
GFR SERPL CREATININE-BSD FRML MDRD: 39 ML/MIN/1.73SQ M
GLUCOSE SERPL-MCNC: 195 MG/DL (ref 65–140)
GLUCOSE SERPL-MCNC: 225 MG/DL (ref 65–140)
GLUCOSE SERPL-MCNC: 241 MG/DL (ref 65–140)
GLUCOSE SERPL-MCNC: 243 MG/DL (ref 65–140)
GLUCOSE SERPL-MCNC: 356 MG/DL (ref 65–140)
MAGNESIUM SERPL-MCNC: 1.7 MG/DL (ref 1.6–2.6)
POTASSIUM SERPL-SCNC: 3.2 MMOL/L (ref 3.5–5.3)
SODIUM SERPL-SCNC: 137 MMOL/L (ref 136–145)

## 2018-01-16 PROCEDURE — 80048 BASIC METABOLIC PNL TOTAL CA: CPT | Performed by: PHYSICIAN ASSISTANT

## 2018-01-16 PROCEDURE — 92526 ORAL FUNCTION THERAPY: CPT

## 2018-01-16 PROCEDURE — 82948 REAGENT STRIP/BLOOD GLUCOSE: CPT

## 2018-01-16 PROCEDURE — 83735 ASSAY OF MAGNESIUM: CPT | Performed by: PHYSICIAN ASSISTANT

## 2018-01-16 RX ORDER — LEVOFLOXACIN 5 MG/ML
500 INJECTION, SOLUTION INTRAVENOUS EVERY 24 HOURS
Status: COMPLETED | OUTPATIENT
Start: 2018-01-16 | End: 2018-01-18

## 2018-01-16 RX ORDER — DOCUSATE SODIUM 100 MG/1
100 CAPSULE, LIQUID FILLED ORAL 2 TIMES DAILY
Status: DISCONTINUED | OUTPATIENT
Start: 2018-01-16 | End: 2018-01-17

## 2018-01-16 RX ORDER — INSULIN GLARGINE 100 [IU]/ML
12 INJECTION, SOLUTION SUBCUTANEOUS
Status: DISCONTINUED | OUTPATIENT
Start: 2018-01-16 | End: 2018-01-17

## 2018-01-16 RX ORDER — POTASSIUM CHLORIDE 20MEQ/15ML
40 LIQUID (ML) ORAL 2 TIMES DAILY
Status: COMPLETED | OUTPATIENT
Start: 2018-01-16 | End: 2018-01-16

## 2018-01-16 RX ADMIN — GUAIFENESIN 200 MG: 100 SOLUTION ORAL at 18:53

## 2018-01-16 RX ADMIN — GUAIFENESIN 200 MG: 100 SOLUTION ORAL at 22:08

## 2018-01-16 RX ADMIN — GUAIFENESIN 200 MG: 100 SOLUTION ORAL at 05:37

## 2018-01-16 RX ADMIN — INSULIN LISPRO 3 UNITS: 100 INJECTION, SOLUTION INTRAVENOUS; SUBCUTANEOUS at 18:54

## 2018-01-16 RX ADMIN — HEPARIN SODIUM 5000 UNITS: 5000 INJECTION, SOLUTION INTRAVENOUS; SUBCUTANEOUS at 14:04

## 2018-01-16 RX ADMIN — POTASSIUM CHLORIDE 40 MEQ: 20 SOLUTION ORAL at 09:12

## 2018-01-16 RX ADMIN — GUAIFENESIN 200 MG: 100 SOLUTION ORAL at 02:09

## 2018-01-16 RX ADMIN — GUAIFENESIN 200 MG: 100 SOLUTION ORAL at 14:04

## 2018-01-16 RX ADMIN — ACETAMINOPHEN 975 MG: 325 TABLET ORAL at 22:06

## 2018-01-16 RX ADMIN — ACETAMINOPHEN 975 MG: 325 TABLET ORAL at 05:37

## 2018-01-16 RX ADMIN — POTASSIUM CHLORIDE 40 MEQ: 20 SOLUTION ORAL at 18:53

## 2018-01-16 RX ADMIN — HEPARIN SODIUM 5000 UNITS: 5000 INJECTION, SOLUTION INTRAVENOUS; SUBCUTANEOUS at 22:06

## 2018-01-16 RX ADMIN — INSULIN GLARGINE 12 UNITS: 100 INJECTION, SOLUTION SUBCUTANEOUS at 22:08

## 2018-01-16 RX ADMIN — POTASSIUM CHLORIDE 40 MEQ: 20 SOLUTION ORAL at 12:00

## 2018-01-16 RX ADMIN — ACETAMINOPHEN 975 MG: 325 TABLET ORAL at 14:04

## 2018-01-16 RX ADMIN — INSULIN LISPRO 3 UNITS: 100 INJECTION, SOLUTION INTRAVENOUS; SUBCUTANEOUS at 14:05

## 2018-01-16 RX ADMIN — INSULIN LISPRO 2 UNITS: 100 INJECTION, SOLUTION INTRAVENOUS; SUBCUTANEOUS at 09:13

## 2018-01-16 RX ADMIN — LEVOFLOXACIN 500 MG: 5 INJECTION, SOLUTION INTRAVENOUS at 14:06

## 2018-01-16 RX ADMIN — HEPARIN SODIUM 5000 UNITS: 5000 INJECTION, SOLUTION INTRAVENOUS; SUBCUTANEOUS at 05:37

## 2018-01-16 RX ADMIN — DOCUSATE SODIUM 100 MG: 100 CAPSULE, LIQUID FILLED ORAL at 14:06

## 2018-01-16 RX ADMIN — INSULIN LISPRO 6 UNITS: 100 INJECTION, SOLUTION INTRAVENOUS; SUBCUTANEOUS at 22:06

## 2018-01-16 RX ADMIN — Medication 1 TABLET: at 22:07

## 2018-01-16 RX ADMIN — ASPIRIN 81 MG 81 MG: 81 TABLET ORAL at 09:12

## 2018-01-16 RX ADMIN — DOCUSATE SODIUM 100 MG: 100 CAPSULE, LIQUID FILLED ORAL at 18:54

## 2018-01-16 RX ADMIN — GUAIFENESIN 200 MG: 100 SOLUTION ORAL at 09:12

## 2018-01-16 NOTE — PROGRESS NOTES
Progress Note - Gaye Starr 1940, 76 y o  female MRN: [id number]    Unit/Bed#: [place] [address] -01 Encounter: [id number]    Primary Care Provider: Steffi Corral MD   Date and time admitted to hospital: 1/7/2018  9:08 AM        Acute respiratory failure with hypoxia [facility])   Assessment & Plan    Status post ICU stay with intubation extubated on 1/10/17, likely secondary to sepsis, and  fluid overload and HHNK  Pt now on RA w/o wob, lungs are CTA b/l 96% on RA        Sepsis ([facility] [address] )   Assessment & Plan    Secondary to UTI  Patient with 1 of 2 blood cultures positive for Klebsiella  Now improved leukocytes normal patient is afebrile  Repeat cultures thus far negative to date  Patient has been on Levaquin for total of 10 days of antibiotics  Day 10  of 10 of abx and finished course of levaquin/cefepime        * Encephalopathy   Assessment & Plan    Multifactorial, likely toxic encephalopathy, likely secondary to acute fluid overload in UTI, PARVIZ/sepsis  Now resolved  [facility]  Continue to reorient, continue to monitor  Appreciate geriatric consult  Pt will need [facility]        Diabetes ([facility] [address] )   Assessment & Plan    Uncontrolled  Increase lantus to 12 IU from 10 IU  Continue SSI, diabetic diet, monitor BS carefully given poor appetite        NSTEMI (non-ST elevated myocardial infarction) ([facility] [address] )   Assessment & Plan    Type 2* ARF, sepsis, HHNK, now resolved          Hypoalbuminemia   Assessment & Plan    With concern for FTT  Pt's sister recently passed away, current contact is Richelle Alonso  Will need STR and likely long term care as there is high concern for being unable to take care of herself        Acute kidney injury [facility])   Assessment & Plan    Unknown baseline appears to be stable now w/IVF hydration  Creatinine 2 26 on admission now 1 0-1 3 which appears to be new BL          Hypokalemia   Assessment & Plan    rec'd one oral potassium prior to blood work will provide a total of 80meq PO KCl          CTS   Pt reports s/sx do no bother her  No NSAIDS given recent PARVIZ   Will benefit from OTC wrist guard upon d/c with sleeping    VTE Pharmacologic Prophylaxis:   Pharmacologic: Heparin  Mechanical VTE Prophylaxis in Place: Yes    Patient Centered Rounds: I have performed bedside rounds with nursing staff today  Discussions with Specialists or Other Care Team Provider:     Education and Discussions with Family / Patient:     Time Spent for Care: 20 minutes  More than 50% of total time spent on counseling and coordination of care as described above  Current Length of Stay: 9 day(s)    Current Patient Status: Inpatient   Certification Statement: medically stable for d/c once pt placed for rehab and long term care    Discharge Plan:     Code Status: Level 3 - DNAR and DNI      Subjective:   No events overnight per nursing, although patient is not eating much due to not having her dentures by her report  The patient denies any chest pain or shortness of breath any lightheadedness or dizziness  She reports no nausea vomiting fevers or chills or diarrhea  She reports that her appetite is not good  She feels tired but does not feel ill  She does note that she has some mild numbness in her right hand and on further questioning reports that is limited to the hand and the 1st 3 fingers distribution  She reports that she must have slept on it wrong  She denies a history of carpal tunnel  She denies any weakness in the arms or legs or further numbness in arms/legs  No blurry vision, HA, or slurred speech  Objective:     Vitals:   Temp (24hrs), Av 4 °F (36 3 °C), Min:95 6 °F (35 3 °C), Max:99 °F (37 2 °C)    HR:  [104-114] 104  Resp:  [18-20] 18  BP: (108-238)/(65-79) 131/79  SpO2:  [91 %-96 %] 96 %  Body mass index is 27 51 kg/m²       Input and Output Summary (last 24 hours):     No intake or output data in the 24 hours ending 01/16/18 1147    Physical Exam:     Physical Exam   Constitutional: She appears well-developed and well-nourished  No distress  HENT:   Head: Normocephalic and atraumatic  Right Ear: External ear normal    Left Ear: External ear normal    Mouth/Throat: Oropharynx is clear and moist  No oropharyngeal exudate  Eyes: Conjunctivae and EOM are normal  Pupils are equal, round, and reactive to light  Right eye exhibits no discharge  Left eye exhibits no discharge  No scleral icterus  Neck: Normal range of motion  Cardiovascular: Normal rate, regular rhythm, normal heart sounds and intact distal pulses  Exam reveals no gallop and no friction rub  No murmur heard  Pulmonary/Chest: No respiratory distress  She has no wheezes  She has no rales  Abdominal: Soft  She exhibits no distension  There is no tenderness  There is no rebound and no guarding  Musculoskeletal: She exhibits no edema  Lymphadenopathy:     She has no cervical adenopathy  Neurological: She is alert  No cranial nerve deficit  She exhibits abnormal muscle tone (4/5 strength in UE to elbow/extension /flexion b/l, and 4/5 strength in LE b/l w/hip flexion/extension)  Coordination normal    Crude sensation intact in RUE compared to left  Positive Tinel's signa   Skin: Skin is warm and dry  She is not diaphoretic  Vitals reviewed  Additional Data:     Labs:          Results from last 7 days  Lab Units 01/16/18  0601   SODIUM mmol/L 137   POTASSIUM mmol/L 3 2*   CHLORIDE mmol/L 97*   CO2 mmol/L 27   BUN mg/dL 19   CREATININE mg/dL 1 30   CALCIUM mg/dL 9 1   GLUCOSE RANDOM mg/dL 225*           * I Have Reviewed All Lab Data Listed Above  * Additional Pertinent Lab Tests Reviewed:  [address] Admission Reviewed    Imaging:    Imaging Reports Reviewed Today Include:   Imaging Personally Reviewed by Myself Includes:    Recent Cultures (last 7 days):           Last 24 Hours Medication List:     acetaminophen 975 mg Oral Q8H [address]   aspirin 81 mg Oral Daily   guaiFENesin 200 mg Oral Q4H   heparin (porcine) 5,000 Units Subcutaneous Q8H [address]   insulin glargine 10 Units Subcutaneous HS   insulin lispro 1-6 Units Subcutaneous TID AC   insulin lispro 1-6 Units Subcutaneous HS   levofloxacin 500 mg Intravenous Q24H   potassium chloride 40 mEq Oral BID   senna-docusate sodium 1 tablet Oral HS        Today, Patient Was Seen By: Alie Meehan PA-C    ** Please Note: Dictation voice to text software may have been used in the creation of this document   **

## 2018-01-16 NOTE — CASE MANAGEMENT
Thank you,  [facility] in the [facility] by Vel Dempsey for 2017  Network Utilization Review Department  Phone: [phone]; Fax [phone]  ATTENTION: The Network Utilization Review Department is now centralized for our 7 Facilities  Make a note that we have a new phone and fax numbers for our Department  Please call with any questions or concerns to [phone] and carefully follow the prompts so that you are directed to the right person  All voicemails are confidential  Fax any determinations, approvals, denials, and requests for initial or continue stay review clinical to [phone]  Due to HIGH CALL volume, it would be easier if you could please send faxed requests to expedite your requests and in part, help us provide discharge notifications faster  Continued Stay Review    Date:  1/16/18 Tuesday ACUTE MED SURG LEVEL OF CARE    Vital Signs: /68   Pulse (!) 107   Temp (!) 97 4 °F (36 3 °C) (Tympanic)   Resp 18   Ht 5' 6" (1 676 m)   Wt 77 3 kg (170 lb 6 7 oz)   SpO2 94%   BMI 27 51 kg/m²       01/15 0701 01/16 0700 01/16 0701  01/16 1523  Most Recent    Temperature (°F) 97  599 95 697 4  97 4 (36 3)    Pulse 91114 104107  107    Respirations 1820 18  18    Blood Pressure 101/55238/73 118/68131/79  118/68    SpO2 (%) 91 9496  94      I/O   01/14 0701  01/15 0700 01/15 0701 01/16 0700 01/16 0701 01/17 0700         Unmeasured Urine Occurrence 1 x         Diet Dysphagia/Modified Consistency; Dysphagia 1-Pureed;  Thin Liquid     Dietary nutrition supplements Ensure TID with Meals      IV ACCESS      Medications:   Scheduled Meds:   acetaminophen 975 mg Oral Q8H [address]   aspirin 81 mg Oral Daily   docusate sodium 100 mg Oral BID   guaiFENesin 200 mg Oral Q4H   heparin (porcine) 5,000 Units Subcutaneous Q8H [address]   insulin glargine 12 Units Subcutaneous HS   insulin lispro 1-6 Units Subcutaneous TID AC   insulin lispro 1-6 Units Subcutaneous HS   potassium chloride 40 mEq Oral BID   senna-docusate sodium 1 tablet Oral HS        PRN Meds:       OLANZapine    LABS/Diagnostic Results:    CMP  Results from last 7 days  Lab Units 01/16/18  0601   SODIUM mmol/L 137   POTASSIUM mmol/L 3 2*   CHLORIDE mmol/L 97*   CO2 mmol/L 27   BUN mg/dL 19   CREATININE mg/dL 1 30   CALCIUM mg/dL 9 1   GLUCOSE RANDOM mg/dL 225*         Age/Sex: 76 y o  female     Assessment/Plan:        Acute respiratory failure with hypoxia (HCC)   Assessment & Plan     Status post ICU stay with intubation extubated on 1/10/17, likely secondary to sepsis, and  fluid overload and HHNK  Pt now on RA w/o wob, lungs are CTA b/l 96% on RA       Sepsis ([facility] )   Assessment & Plan     Secondary to UTI  Patient with 1 of 2 blood cultures positive for Klebsiella  Now improved leukocytes normal patient is afebrile  Repeat cultures thus far negative to date  Patient has been on Levaquin for total of 10 days of antibiotics  Day 10  of 10 of abx and finished course of levaquin/cefepime       * Encephalopathy   Assessment & Plan     Multifactorial, likely toxic encephalopathy, likely secondary to acute fluid overload in UTI, PARVIZ/sepsis  Now resolved  With mild cognitive decline suspected at [facility] per geriatrics  Continue to reorient, continue to monitor  Appreciate geriatric consult  Pt will need [facility] with  [facility]       Diabetes ([facility] )   Assessment & Plan     Uncontrolled  Increase lantus to 12 IU from 10 IU  Continue SSI, diabetic diet, monitor BS carefully given poor appetite       NSTEMI (non-ST elevated myocardial infarction) ([facility] [address] )   Assessment & Plan     Type 2* ARF, sepsis, HHNK, now resolved          Hypoalbuminemia   Assessment & Plan     With concern for FTT  Pt's sister recently passed away, current contact is Kelley Chester  Will need STR and likely long term care as there is high concern for being unable to take care of herself       Acute kidney injury [facility])   Assessment & Plan     Unknown baseline appears to be stable now w/IVF hydration  Creatinine 2 26 on admission now 1 0-1 3 which appears to be new BL          Hypokalemia   Assessment & Plan     rec'd one oral potassium prior to blood work will provide a total of 80meq PO KCl          CTS               Pt reports s/sx do no bother her  No NSAIDS given recent PARVIZ               Will benefit from OTC wrist guard upon d/c with sleeping     VTE Pharmacologic Prophylaxis:   Pharmacologic: Heparin  Mechanical VTE Prophylaxis in Place: Yes      Current Length of Stay: 9 day(s)     Current Patient Status: Inpatient          Discharge Plan:   ANTICIPATE DISCHARGE TO SHORT TERM SKILLED INPT REHAB  WHEN MEDICALLY CLEARED    PER OT 1/15/18  Plan   Treatment Interventions ADL retraining;Functional transfer training; Endurance training;Cognitive reorientation;UE strengthening/ROM   Goal Expiration Date 01/22/18   Treatment Day 1   OT Frequency 3-5x/wk   Recommendation   OT Discharge Recommendation Short Term Rehab       CASE MANAGEMENT FOLLOWING CLOSELY FOR ALL DISCHARGE NEEDS

## 2018-01-16 NOTE — ASSESSMENT & PLAN NOTE
Uncontrolled  Increase lantus to 12 IU from 10 IU  Continue SSI, diabetic diet, monitor BS carefully given poor appetite

## 2018-01-16 NOTE — PLAN OF CARE
DISCHARGE PLANNING - CARE MANAGEMENT     Discharge to post-acute care or home with appropriate resources Progressing        GASTROINTESTINAL - ADULT     Maintains or returns to baseline bowel function Progressing     Maintains adequate nutritional intake Progressing        METABOLIC, FLUID AND ELECTROLYTES - ADULT     Electrolytes maintained within normal limits Progressing     Fluid balance maintained Progressing     Glucose maintained within target range Progressing        NEUROSENSORY - ADULT     Achieves stable or improved neurological status Progressing        Nutrition/Hydration-ADULT     Nutrient/Hydration intake appropriate for improving, restoring or maintaining nutritional needs Progressing        Potential for Falls     Patient will remain free of falls Progressing        Prexisting or High Potential for Compromised Skin Integrity     Skin integrity is maintained or improved Progressing        RESPIRATORY - ADULT     Achieves optimal ventilation and oxygenation Progressing        SKIN/TISSUE INTEGRITY - ADULT     Skin integrity remains intact Progressing

## 2018-01-16 NOTE — ASSESSMENT & PLAN NOTE
With concern for FTT  Pt's sister recently passed away, current contact is Kitty Nunes  Will need STR and likely long term care as there is high concern for being unable to take care of herself

## 2018-01-16 NOTE — ASSESSMENT & PLAN NOTE
Unknown baseline appears to be stable now w/IVF hydration  Creatinine 2 26 on admission now 1 0-1 13 which appears to be new BL

## 2018-01-16 NOTE — ASSESSMENT & PLAN NOTE
Multifactorial, likely toxic encephalopathy, likely secondary to acute fluid overload in UTI, PARVIZ/sepsis  Now resolved  With mild cognitive decline suspected at [facility] per geriatrics  Continue to reorient, continue to monitor  Appreciate geriatric consult  Pt will need OP f/[facility] with  [facility] upon d/c

## 2018-01-16 NOTE — SOCIAL WORK
CM called both sisters, Michael Stanley @[phone] and again called Jasperlalo Trivedii @ [phone]  Neither answered but LMOM for both to call CM back

## 2018-01-16 NOTE — ASSESSMENT & PLAN NOTE
Secondary to UTI  Patient with 1 of 2 blood cultures positive for Klebsiella  Now improved leukocytes normal patient is afebrile  Repeat cultures thus far negative to date  Patient has been on Levaquin for total of 10 days of antibiotics  Day 10  of 10 of abx and finished course of levaquin/cefepime

## 2018-01-16 NOTE — ASSESSMENT & PLAN NOTE
Status post ICU stay with intubation extubated on 1/10/17, likely secondary to sepsis, and  fluid overload and HHNK  Pt now on RA w/o wob, lungs are CTA b/l 96% on RA

## 2018-01-16 NOTE — SPEECH THERAPY NOTE
Speech Language/Pathology    Speech/Language Pathology Progress Note    Patient Name: Ashvin BERMANMNY'RUBEN Date: 1/16/2018     Problem List  Patient Active Problem List   Diagnosis    Encephalopathy    Hypokalemia    [facility]    Dehydration    Hypoalbuminemia    Risk for falls    Debility    Sepsis [facility]    Acute respiratory failure with hypoxia [facility]    NSTEMI (non-ST elevated myocardial infarction) ([facility] )    Diabetes ([facility] )        Past Medical History  Past Medical History:   Diagnosis Date    Arthritis     Diabetes mellitus ([facility] )     Hypertension     Memory loss         Past Surgical History  History reviewed  No pertinent surgical history  Subjective:  "i'm getting sick of ice cream"  Objective:  Pt reporting no BM and also stomach discomfort  Nurse in room and aware  Pt seen for dysphagia tx, however, dentures are still not here  I asked the pt what she would like to eat when she got them  "oh, I don't know"  Assessment:  With encouragement she ate some of the boost pudding  Also agreeable to prune juice today  Ice cream had been one of the few things she was eating  Now getting sick of it no s/s aspiratron  Poor intake  ? Lack of BM contributing, also results of abdominal CT  Plan/Recommendations:  F/u when dentures are available

## 2018-01-17 ENCOUNTER — APPOINTMENT (INPATIENT)
Dept: RADIOLOGY | Facility: HOSPITAL | Age: 78
DRG: 871 | End: 2018-01-17
Payer: COMMERCIAL

## 2018-01-17 LAB
ANION GAP SERPL CALCULATED.3IONS-SCNC: 12 MMOL/L (ref 4–13)
BUN SERPL-MCNC: 31 MG/DL (ref 5–25)
CALCIUM SERPL-MCNC: 9 MG/DL (ref 8.3–10.1)
CHLORIDE SERPL-SCNC: 96 MMOL/L (ref 100–108)
CO2 SERPL-SCNC: 24 MMOL/L (ref 21–32)
CREAT SERPL-MCNC: 2.62 MG/DL (ref 0.6–1.3)
GFR SERPL CREATININE-BSD FRML MDRD: 17 ML/MIN/1.73SQ M
GLUCOSE SERPL-MCNC: 141 MG/DL (ref 65–140)
GLUCOSE SERPL-MCNC: 169 MG/DL (ref 65–140)
GLUCOSE SERPL-MCNC: 208 MG/DL (ref 65–140)
GLUCOSE SERPL-MCNC: 241 MG/DL (ref 65–140)
GLUCOSE SERPL-MCNC: 251 MG/DL (ref 65–140)
GLUCOSE SERPL-MCNC: 275 MG/DL (ref 65–140)
POTASSIUM SERPL-SCNC: 5.2 MMOL/L (ref 3.5–5.3)
SODIUM SERPL-SCNC: 132 MMOL/L (ref 136–145)

## 2018-01-17 PROCEDURE — 80048 BASIC METABOLIC PNL TOTAL CA: CPT | Performed by: PHYSICIAN ASSISTANT

## 2018-01-17 PROCEDURE — 97530 THERAPEUTIC ACTIVITIES: CPT

## 2018-01-17 PROCEDURE — 97535 SELF CARE MNGMENT TRAINING: CPT

## 2018-01-17 PROCEDURE — 82948 REAGENT STRIP/BLOOD GLUCOSE: CPT

## 2018-01-17 PROCEDURE — 74022 RADEX COMPL AQT ABD SERIES: CPT

## 2018-01-17 PROCEDURE — 0T9B70Z DRAINAGE OF BLADDER WITH DRAINAGE DEVICE, VIA NATURAL OR ARTIFICIAL OPENING: ICD-10-PCS | Performed by: HOSPITALIST

## 2018-01-17 PROCEDURE — 97110 THERAPEUTIC EXERCISES: CPT

## 2018-01-17 PROCEDURE — 97116 GAIT TRAINING THERAPY: CPT

## 2018-01-17 RX ORDER — POTASSIUM CHLORIDE 20MEQ/15ML
20 LIQUID (ML) ORAL ONCE
Status: DISCONTINUED | OUTPATIENT
Start: 2018-01-17 | End: 2018-01-17

## 2018-01-17 RX ORDER — LACTULOSE 20 G/30ML
20 SOLUTION ORAL 2 TIMES DAILY
Status: COMPLETED | OUTPATIENT
Start: 2018-01-17 | End: 2018-01-17

## 2018-01-17 RX ORDER — LANOLIN ALCOHOL/MO/W.PET/CERES
3 CREAM (GRAM) TOPICAL
Status: DISCONTINUED | OUTPATIENT
Start: 2018-01-17 | End: 2018-01-24 | Stop reason: HOSPADM

## 2018-01-17 RX ORDER — SODIUM CHLORIDE AND POTASSIUM CHLORIDE .9; .15 G/100ML; G/100ML
50 SOLUTION INTRAVENOUS ONCE
Status: DISCONTINUED | OUTPATIENT
Start: 2018-01-17 | End: 2018-01-17

## 2018-01-17 RX ORDER — INSULIN GLARGINE 100 [IU]/ML
15 INJECTION, SOLUTION SUBCUTANEOUS
Status: DISCONTINUED | OUTPATIENT
Start: 2018-01-17 | End: 2018-01-18

## 2018-01-17 RX ORDER — MELATONIN
1000 DAILY
Status: DISCONTINUED | OUTPATIENT
Start: 2018-01-18 | End: 2018-01-24 | Stop reason: HOSPADM

## 2018-01-17 RX ADMIN — ACETAMINOPHEN 975 MG: 325 TABLET ORAL at 22:18

## 2018-01-17 RX ADMIN — DOCUSATE SODIUM 100 MG: 100 CAPSULE, LIQUID FILLED ORAL at 09:03

## 2018-01-17 RX ADMIN — LACTULOSE 20 G: 20 SOLUTION ORAL at 11:53

## 2018-01-17 RX ADMIN — INSULIN LISPRO 2 UNITS: 100 INJECTION, SOLUTION INTRAVENOUS; SUBCUTANEOUS at 18:14

## 2018-01-17 RX ADMIN — ACETAMINOPHEN 975 MG: 325 TABLET ORAL at 13:13

## 2018-01-17 RX ADMIN — HEPARIN SODIUM 5000 UNITS: 5000 INJECTION, SOLUTION INTRAVENOUS; SUBCUTANEOUS at 06:24

## 2018-01-17 RX ADMIN — GUAIFENESIN 200 MG: 100 SOLUTION ORAL at 09:03

## 2018-01-17 RX ADMIN — LACTULOSE 20 G: 20 SOLUTION ORAL at 18:14

## 2018-01-17 RX ADMIN — ACETAMINOPHEN 975 MG: 325 TABLET ORAL at 06:22

## 2018-01-17 RX ADMIN — INSULIN LISPRO 3 UNITS: 100 INJECTION, SOLUTION INTRAVENOUS; SUBCUTANEOUS at 09:06

## 2018-01-17 RX ADMIN — HEPARIN SODIUM 5000 UNITS: 5000 INJECTION, SOLUTION INTRAVENOUS; SUBCUTANEOUS at 13:13

## 2018-01-17 RX ADMIN — Medication 1 TABLET: at 22:18

## 2018-01-17 RX ADMIN — HEPARIN SODIUM 5000 UNITS: 5000 INJECTION, SOLUTION INTRAVENOUS; SUBCUTANEOUS at 22:18

## 2018-01-17 RX ADMIN — ASPIRIN 81 MG 81 MG: 81 TABLET ORAL at 09:03

## 2018-01-17 RX ADMIN — INSULIN GLARGINE 15 UNITS: 100 INJECTION, SOLUTION SUBCUTANEOUS at 22:19

## 2018-01-17 RX ADMIN — GUAIFENESIN 200 MG: 100 SOLUTION ORAL at 13:13

## 2018-01-17 NOTE — PROGRESS NOTES
Progress Note -  Internal Medicine / Hospitalists  Ainsley Mckenna 76 y o  female MRN: [id number]  Unit/Bed#: [place] [facility] 2 -01 Encounter: [id number]      ASSESSMENT AND PLAN:  1-Acute hypoxic respiratory failure-requiring endotracheal intubation-successfully extubated on 01/10/2017   multifactorial due to UTI with sepsis, fluid overload and  HHNK   This has resolved, completed 10 d leaquin/cefepine    Currently saturating at 95% on room air      2-Acute metabolic encephalopathy:improved-secondary to 1 and 9-kyjfzllw-eikrtrwcg is back to baseline      3-Diabetes mellitis presenting with with HHNK-this is now resolved  Patient is on lantus 10 units at night and SSI  accuchecks 213-356     4-NSTEMI type II secondary to [address] and UTI-stable     5-Sepsis with klebsiella UTI-see #1  Compeleted 10d levaquin     6-Acute renal failure with unknown baseline creatinine-creatinine in May of 2016 was 0 79   Currently 1 3      7- Urinary Retention - informed today by nurse Amy Hamilton patient did not urinate for several days  Coude needed and > 1000cc out    8-depression-reactive-started patient on low-dose Zyprexa     5-desdtczjzzt-4 2 s/p repletion    10-Dysphagia - pureed  Re-eval when dentures are here    11- constipation - no BM for several day; occaisional flatus  No vomit  mild tender  Check obs series, no signs of obstruction  Lactulose x 2    12-sinus tachycardia-gentle IVF    VTE Prophylaxis: Heparin    Dispo: monitor intake/output  Check obs series, if no obs then lactulose  Rehab when stable - next 24-48h  D/w Gearl Press sister  ______________________________________________________________________    SUBJECTIVE:   Patient seen and examined  Patient complains of mild abdominal pain  She was bloated  She feel some relief after the catheter has been placed  She status that her abdomen is still sometimes tender  She sometimes feels nauseous  But she has not vomited  She has occasional flat Ace    She denies any fevers chills chest pain shortness of breath or palpitations  OBJECTIVE:   Principal Problem:    Encephalopathy  Active Problems:    Hypokalemia    Acute kidney injury ([place] [address] )    Dehydration    Hypoalbuminemia    Risk for falls    Debility    Sepsis ([place] [address] )    Acute respiratory failure with hypoxia (HCC)    NSTEMI (non-ST elevated myocardial infarction) ([place] [address] )    Diabetes ([place] [address] )      Vitals:   HR:  [107-112] 112  Resp:  [18] 18  BP: (101-118)/(63-68) 101/67  SpO2:  [90 %-95 %] 95 %  Temp (24hrs), Av 1 °F (36 2 °C), Min:96 3 °F (35 7 °C), Max:97 6 °F (36 4 °C)  Current: Temperature: 97 6 °F (36 4 °C)    Intake/Output Summary (Last 24 hours) at 18 0900  Last data filed at 18 2201   Gross per 24 hour   Intake              150 ml   Output                0 ml   Net              150 ml       Physical Exam:     General Appearance:    Alert, cooperative, no distress   Head:    Normocephalic, without obvious abnormality, atraumatic   Eyes:    Conjunctiva/corneas clear, EOM's intact       Neck:   Supple, no adenopathy, no JVD   Back:     Symmetric, no curvature, ROM normal, no CVA tenderness   Lungs:     Coarse but clear to auscultation bilaterally, no wheezing or rhonchi   Heart:    Regular rate and rhythm, S1 and S2 normal, no murmur, rub   or gallop   Abdomen:     Soft, mildly diffusely tender, no acute abdomen, bowel sounds decreased   Extremities:   Extremities normal, atraumatic, no cyanosis or edema   Psych:   Normal Affect   Neurologic:   CNII-XII intact  Weak/deconditioned     Lab, Imaging and other studies:          Results from last 7 days  Lab Units 18  0601   SODIUM mmol/L 137   POTASSIUM mmol/L 3 2*   CHLORIDE mmol/L 97*   CO2 mmol/L 27   BUN mg/dL 19   CREATININE mg/dL 1 30   CALCIUM mg/dL 9 1   GLUCOSE RANDOM mg/dL 225*         Lab Results   Component Value Date    BLOODCX No Growth After 5 Days  2018    BLOODCX No Growth After 5 Days   2018    BLOODCX Klebsiella pneumoniae (A) 01/07/2018    URINECX 50,000-59,000 cfu/ml Klebsiella pneumoniae (A) 01/07/2018       Scheduled Meds:    acetaminophen 975 mg Oral Q8H [address]   aspirin 81 mg Oral Daily   docusate sodium 100 mg Oral BID   guaiFENesin 200 mg Oral Q4H   heparin (porcine) 5,000 Units Subcutaneous Q8H [address]   insulin glargine 12 Units Subcutaneous HS   insulin lispro 1-6 Units Subcutaneous TID AC   insulin lispro 1-6 Units Subcutaneous HS   senna-docusate sodium 1 tablet Oral HS       Continuous Infusions:       PRN Meds:  OLANZapine      Counseling / Coordination of Care  Total floor / unit time spent today 30 minutes  minutes  Greater than 50% of total time was spent with the patient and / or family counseling and / or coordination of care         This note has been constructed using a voice recognition system

## 2018-01-17 NOTE — OCCUPATIONAL THERAPY NOTE
Occupational Therapy Treatment Note:         01/17/18 1051   Restrictions/Precautions   Weight Bearing Precautions Per Order No   Other Precautions Fall Risk;Pain;Cognitive; Chair Alarm; Bed Alarm   Pain Assessment   Pain Assessment 0-10  (Simultaneous filing  User may not have seen previous data )   Pain Score 4   Pain Location Hip   Pain Orientation Bilateral   ADL   Where Assessed Edge of bed   Grooming Assistance 4  Minimal Assistance   Grooming Deficit Setup;Steadying;Verbal cueing;Supervision/safety; Increased time to complete;Standing with assistive device; Wash/dry hands;Brushing hair   Grooming Comments Early termination of tasks noted this tx sesison  UB Bathing Assistance 4  Minimal Assistance   UB Bathing Deficit Setup   LB Bathing Assistance 2  Maximal Assistance   LB Bathing Deficit Setup;Steadying;Verbal cueing;Supervision/safety; Increased time to complete; Buttocks; Perineal area;Right lower leg including foot; Left lower leg including foot   LB Bathing Comments cues for upright posture with activity  LB Dressing Assistance 2  Maximal Assistance   LB Dressing Deficit Setup;Steadying; Requires assistive device for steadying;Verbal cueing; Increased time to complete;Supervision/safety; Don/doff L sock; Don/doff R sock   LB Dressing Comments cues required to engage Pt in activity  Limited functional reach noted  Functional Standing Tolerance   Time 2 mins  Activity functional mobility  Comments Pt with improved stand tolerance noted this tx session  Bed Mobility   Supine to Sit 3  Moderate assistance   Additional items Assist x 1;Bedrails; Increased time required;Verbal cues;LE management   Additional Comments Improved sitting balance noted once stabilzed  Transfers   Sit to Stand 3  Moderate assistance   Additional items Assist x 1; Armrests; Bedrails; Increased time required;Verbal cues   Stand to Sit 3  Moderate assistance   Additional items Assist x 1; Armrests; Increased time required;Verbal cues   Stand pivot 3  Moderate assistance   Additional items Assist x 1   Additional Comments Improved stand tolerance noted  moderate encouragement required  Functional Mobility   Functional Mobility 3  Moderate assistance   Additional Comments x2   Additional items Rolling walker   Cognition   Overall Cognitive Status Impaired   Arousal/Participation Alert   Attention Attends with cues to redirect   Orientation Level Oriented to person;Oriented to place   Memory Decreased short term memory;Decreased recall of recent events;Decreased recall of precautions   Following Commands Follows multistep commands with increased time or repetition   Comments Pt with improved mood with focus to tasks noted  Additional Activities   Additional Activities Other (Comment)  (reviewed current plan of care  )   Additional Activities Comments Pt reports having F understanding of need for movement to improve functional status  Activity Tolerance   Activity Tolerance Patient limited by fatigue   Medical Staff Made Aware Reviewed all findings to nursing staff  Assessment   Assessment Pt was seen for skilled OT with focus on completion of self care tasks, functional transfers and review of current plan of care  Pt's neighbor Nikos Muñoz was present during tx session and is active in her care  Pt with noted improvements with stand tolerance and participation levels  See above levels of A required for all functional tasks  Pt will benefit from further rehab with focus on achieving optimal performance levels with all functional tasks  Plan   Treatment Interventions ADL retraining;Functional transfer training; Endurance training;Cognitive reorientation   Goal Expiration Date 01/22/18   Treatment Day 2   OT Frequency 3-5x/wk   Recommendation   OT Discharge Recommendation Short Term Rehab   Barthel Index   Feeding 5   Bathing 0   Grooming Score 0   Dressing Score 5   Bladder Score 5   Bowels Score 5   Toilet Use Score 0   Transfers (Bed/Chair) Score 5   Mobility (Level Surface) Score 0   Stairs Score 0   Barthel Index Score 25   Modified Valliant Scale   Modified Valliant Scale 4   Sergio [place]

## 2018-01-17 NOTE — PROGRESS NOTES
Progress Note - [facility] 76 y o  female MRN: [id number]    Unit/Bed#: [place] [address] -01 Encounter: [id number]      Assessment/Plan:  76year old female with:    1  Toxic metabolic encephalopathy/delirium- multifactorial; resolved  Continued supportive care and management of acute and chronic medical conditions per primary team  D/c PRN zyprexa as patient has not required; will add melatonin 3mg PO QHS PRN for insomnia  Delirium precautions: Maintain normal sleep-wake cycle, allowing for minimally interrupted sleep at night- lights out; keep awake/alert/interactive during the day- lights on/daylight  Ensure adequate nutrition and hydration  Frequent reorientation and redirection  Provide sensory aids at bedside and encourage use  Avoid use of deliriogenic medications, including benadryl, tramadol, benzodiazepines, sleep aids such as ambien or temazepam  Ensure pain is adequately controlled  Monitor for urinary retention and constipation with treatment/prophylaxis as indicated  Remove any unnecessary invasive lines or devices, such as vang catheters, central lines, peripheral monitoring devices (telemetry, continuous pulse ox), venodynes if on chemical DVT prophylaxis  Supportive care and management of acute and chronic medical conditions per primary team     2  Cognitive impairment- likely age related cognitive decline vs MCI in setting of #5  Patient would benefit from comprehensive geriatric and formal cognitive assessments outpatient, allowing for full resolution of #1- will place information for [facility] in discharge instructions  Recommend assistance for medication management to ensure compliance  3  Constipation- management per primary team; agree  Encouraged PO intake and hydration  4  Urinary retention- in the setting of constipation- continued treatment as above   Vang catheter placed; recommend voiding trial in next 24-48h following resolution/treatment of constipation  With history of stress urinary incontinence- recommend scheduled toileting Q2-3h while awake  5  Depression with anxiety- continued supportive care; will defer starting medical treatment inpatient; will need close outpatient follow up  Patient may benefit from citalopram 10mg PO daily; another option could be mirtazapine 7 5-15mg PO QHS which may help with mood, sleep and appetite  6  History of fall- PT/OT, fall precautions  Add Vitamin D3 1000 IU PO daily  7  Deconditioning- supportive care; nutritional support  Agree with recommendation for rehab at d/c     8  Visual impairment- supportive care; provide glasses at bedside and encourage use  9  Diabetes mellitus type 2- continued management per primary team; recommend checking A1c    Subjective:   Patient seen and examined for geriatric follow up  Has not required any PRN zyprexa QHS; no agitation reported, has remained at baseline from a cognitive standpoint  Noted to have urinary retention s/p catheterization; last bowel movement recorded on 1/13/18; given lactulose earlier today with large bowel movement  Patient notes her appetite has improved today; denies nausea or abdominal pain  Admits to forgetfulness, unable to recall everything she ate for lunch  +anxiety, worried about "making a mess" with bowel movements  All other ROS negative  Spoke with nursing at bedside  Objective:     Vitals: Blood pressure 101/67, pulse (!) 112, temperature 97 6 °F (36 4 °C), temperature source Temporal, resp  rate 18, height 5' 6" (1 676 m), weight 77 3 kg (170 lb 6 7 oz), SpO2 95 %  ,Body mass index is 27 51 kg/m²  Intake/Output Summary (Last 24 hours) at 01/17/18 1341  Last data filed at 01/17/18 0901   Gross per 24 hour   Intake              150 ml   Output             1500 ml   Net            -1350 ml       Physical Exam:   Awake and alert, reclined in bed  NAD  Pleasant, cooperative    Mildly anxious and tearful related to bowel and bladder issues; redirectable  NC/AT, MMM, oropharynx clear  Conjunctiva clear b/l  RRR +S1 +S2  CTA b/l auscultated anteriorly  Abdomen distended, nontender, +bowel sounds  Oriented to person, place, situation      Invasive Devices     Peripheral Intravenous Line            Peripheral IV 01/16/18 Left Forearm 1 day          Drain            Urethral Catheter 16 Fr  less than 1 day                Lab, Imaging and other studies: I have personally reviewed pertinent reports      VTE Pharmacologic Prophylaxis: Heparin

## 2018-01-17 NOTE — SOCIAL WORK
CM received a call from a Cari Lesches, patient's nurse  from her insurance company, in regards to a discharge plan for patient  She would like a call when patient is medically cleared to leave  Her number is

## 2018-01-17 NOTE — PLAN OF CARE
Problem: DISCHARGE PLANNING - CARE MANAGEMENT  Goal: Discharge to post-acute care or home with appropriate resources  INTERVENTIONS:  - Conduct assessment to determine patient/family and health care team treatment goals, and need for post-acute services based on payer coverage, community resources, and patient preferences, and barriers to discharge  - Address psychosocial, clinical, and financial barriers to discharge as identified in assessment in conjunction with the patient/family and health care team  - Arrange appropriate level of post-acute services according to patients   needs and preference and payer coverage in collaboration with the physician and health care team  - Communicate with and update the patient/family, physician, and health care team regarding progress on the discharge plan  - Arrange appropriate transportation to post-acute venues   Outcome: Progressing  Referral sent to [facility] for STR  CM to follow

## 2018-01-17 NOTE — PHYSICAL THERAPY NOTE
Physical Therapy Treatment Note       01/17/18 1055   Pain Assessment   Pain Assessment 0-10   Pain Score 8   Pain Type Acute pain   Pain Location Back   Pain Orientation Bilateral   Hospital Pain Intervention(s) Repositioned; Ambulation/increased activity; Emotional support   Response to Interventions tolerated   Restrictions/Precautions   Other Precautions Fall Risk;Pain; Chair Alarm; Bed Alarm   General   Chart Reviewed Yes   Response to Previous Treatment Patient with no complaints from previous session  Family/Caregiver Present Yes  (friend)   Cognition   Overall Cognitive Status Impaired   Arousal/Participation Alert; Cooperative   Attention Attends with cues to redirect   Following Commands Follows one step commands with increased time or repetition   Subjective   Subjective Pt  reporting fatigue and back pain  Bed Mobility   Supine to Sit 3  Moderate assistance   Additional items Assist x 1; Increased time required;Verbal cues;LE management   Transfers   Sit to Stand 4  Minimal assistance   Additional items Assist x 2;Armrests; Increased time required;Verbal cues   Stand to Sit 4  Minimal assistance   Additional items Assist x 2;Armrests; Increased time required;Verbal cues   Ambulation/Elevation   Gait pattern Forward Flexion; Poor UE support;Decreased foot clearance; Short stride; Step to;Excessively slow; Inconsistent prateek   Gait Assistance 3  Moderate assist   Additional items Assist x 1;Verbal cues   Assistive Device Rolling walker   Distance 3'x1, 20'x2   Balance   Static Sitting Fair +   Static Standing Fair -   Ambulatory Poor   Endurance Deficit   Endurance Deficit Yes  (fatigue, )   Activity Tolerance   Activity Tolerance Patient limited by fatigue   Nurse Made Aware RNmitzi   Exercises   Hip Flexion Sitting;10 reps;AROM; Bilateral   Hip Adduction Sitting;10 reps;AROM; Bilateral   Knee AROM Long Arc Quad Sitting;10 reps;AROM; Bilateral   Ankle Pumps Sitting;10 reps;AROM; Bilateral   Assessment Prognosis Fair   Problem List Decreased strength;Decreased endurance; Impaired balance;Decreased mobility; Decreased cognition;Decreased safety awareness; Impaired judgement;Pain   Assessment Pt continues to demonstrate impaired ability to perform bed mobility, transfers and ambulation, requiring min- mod assist 1-2  Pt progressed with ambulation distances to 20'x2 with much encouragement  PT requires verbal cues for all aspects of mobility  Pt is easily fatigued and requires frequent brief rests t/o session  Pt performed arom to b/l le's with verbal and tactile cues for correct performance and exercise techniques  Pt remained seated out of bed in chair post PT session  Recommend inpt rehab at d/c [facility] to maximize functional mobility and I      Goals   Patient Goals to get stronger  STG Expiration Date 01/21/18   Treatment Day 2   Plan   Treatment/Interventions Functional transfer training;LE strengthening/ROM; Therapeutic exercise; Endurance training;Patient/family training;Equipment eval/education; Bed mobility;Gait training;OT;Spoke to nursing   Progress Slow progress, decreased activity tolerance   PT Frequency 5x/wk   Recommendation   Recommendation (inpt rehab)   PT - OK to Discharge Yes  (to rehab)       Meggan Douglas, PTA

## 2018-01-17 NOTE — PLAN OF CARE
Problem: OCCUPATIONAL THERAPY ADULT  Goal: Performs self-care activities at highest level of function for planned discharge setting  See evaluation for individualized goals  Treatment Interventions: ADL retraining, Functional transfer training, UE strengthening/ROM, Endurance training, Cognitive reorientation, Patient/family training, Equipment evaluation/education, Compensatory technique education, Continued evaluation          See flowsheet documentation for full assessment, interventions and recommendations  Outcome: Progressing  Limitation: Decreased ADL status, Decreased UE strength, Decreased Safe judgement during ADL, Decreased cognition, Decreased endurance, Decreased high-level ADLs  Prognosis: Fair  Assessment: Pt was seen for skilled OT with focus on completion of self care tasks, functional transfers and review of current plan of care  Pt's neighbor Warren Vee was present during tx session and is active in her care  Pt with noted improvements with stand tolerance and participation levels  See above levels of A required for all functional tasks  Pt will benefit from further rehab with focus on achieving optimal performance levels with all functional tasks        OT Discharge Recommendation: Short Term Rehab         Comments: Kenisha York, [address]

## 2018-01-17 NOTE — WOUND OSTOMY CARE
Progress Note - Wound   Lor Segura 76 y o  female MRN: [id number]  Unit/Bed#: [facility] 2 -01 Encounter: [id number]      Assessment:   Patient seen for skin assessment  Lying in bed, denies pain  Patient incontinent of large amount of liquid brown stool  Rutherford catheter in place  Per nursing staff, patient received lactulose for constipation this AM and is now incontinent of liquid stools  Patient's groin, labia and gluteal cleft are erythematous  Her heels and abdominal folds are intact  Findings:  1  Hospital acquired unstageable pressure injury to sacrum--area originally documented as stage 2 by nursing team is now unstageable with 100% yellow slough  2   Hospital acquired stage 1 pressure injury to left proximal buttock  3   Hospital acquired deep tissue injury to left distal buttock--skin currently intact  4   Hospital acquired deep tissue injury x 3 small areas on right buttock--area of three small evolving deep tissue injuries to right buttock  Wound beds are pink and appear to be partial thickness (most likely will evolve to stage 2 pressure injury)  Plan:   1  Apply Hydraguard lotion to b/l heels BID & PRN  2   Moisturize skin daily with nourishing lotion  3   Turn patient every 2 hours  4   Sofcare cushion when getting OOB to chair  5   Cleanse b/l buttocks and sacrum with soap and water, pat dry  Apply calazime paste to open areas TID & PRN with incontinence care  6   Elevate heels off of bed on pillows  7   Wound care team will follow-up on Friday 1/19/208--if liquid stools have resolved, will re-evaluate care of unstageable pressure injury  Plan of care reviewed with primary RN, Alex Rivera  Objective:    Vitals: Blood pressure 107/59, pulse 97, temperature 98 8 °F (37 1 °C), temperature source Tympanic, resp  rate 16, height 5' 6" (1 676 m), weight 77 3 kg (170 lb 6 7 oz), SpO2 94 %  ,Body mass index is 27 51 kg/m²        Pressure Ulcer 01/16/18 Sacrum (Active)   Staging Unstagable 1/17/2018  1:00 PM   Wound Description Clean;Yellow;Slough 1/17/2018  1:00 PM   Vivienne-wound Assessment Erythema;Fragile 1/17/2018  1:00 PM   Shape Round 1/17/2018  1:00 PM   Wound Length (cm) 1 cm 1/17/2018  1:00 PM   Wound Width (cm) 1 3 cm 1/17/2018  1:00 PM   Wound Depth (cm) 0 1/17/2018  1:00 PM   Calculated Wound Area (cm^2) 1 3 cm^2 1/17/2018  1:00 PM   Calculated Wound Volume (cm^3) 0 cm^3 1/17/2018  1:00 PM   Drainage Amount None 1/17/2018  1:00 PM   Treatment Cleansed; Offload; Turn & reposition 1/17/2018  1:00 PM   Dressing Protective barrier 1/17/2018  1:00 PM   Patient Tolerance Tolerated well 1/17/2018  1:00 PM   Dressing Status Open to air 1/17/2018 12:40 AM       Pressure Ulcer 01/17/18 Buttocks Left;Upper Non-blanchable erythema--stage 1 (Active)   Staging Stage I 1/17/2018  1:00 PM   Wound Description Clean;Dry;Non-blanchable erythema; Intact 1/17/2018  1:00 PM   Vivienne-wound Assessment Clean;Dry; Intact 1/17/2018  1:00 PM   Wound Length (cm) 0 5 cm 1/17/2018  1:00 PM   Wound Width (cm) 0 6 cm 1/17/2018  1:00 PM   Wound Depth (cm) 0 1/17/2018  1:00 PM   Calculated Wound Area (cm^2) 0 3 cm^2 1/17/2018  1:00 PM   Calculated Wound Volume (cm^3) 0 cm^3 1/17/2018  1:00 PM   Drainage Amount None 1/17/2018  1:00 PM   Treatment Turn & reposition; Offload;Cleansed 1/17/2018  1:00 PM   Dressing Protective barrier 1/17/2018  1:00 PM   Patient Tolerance Tolerated well 1/17/2018  1:00 PM       Pressure Ulcer 01/17/18 Buttocks Lower; Left Deep tissue injury (Active)   Staging Deep Tissue Injury 1/17/2018  1:00 PM   Wound Description Clean;Dry; Intact; Light purple 1/17/2018  1:00 PM   Vivienne-wound Assessment Clean;Dry; Intact 1/17/2018  1:00 PM   Wound Length (cm) 0 3 cm 1/17/2018  1:00 PM   Wound Width (cm) 1 cm 1/17/2018  1:00 PM   Wound Depth (cm) 0 1/17/2018  1:00 PM   Calculated Wound Area (cm^2) 0 3 cm^2 1/17/2018  1:00 PM   Calculated Wound Volume (cm^3) 0 cm^3 1/17/2018  1:00 PM   Drainage Amount None 1/17/2018  1:00 PM   Treatment Cleansed; Offload; Turn & reposition 1/17/2018  1:00 PM   Dressing Protective barrier 1/17/2018  1:00 PM   Patient Tolerance Tolerated well 1/17/2018  1:00 PM       Pressure Ulcer 01/17/18 Buttocks Right 3 areas of evolving deep tissue injury (Active)   Staging Deep Tissue Injury 1/17/2018  1:00 PM   Wound Description Clean;Dry; Intact; Light purple 1/17/2018  1:00 PM   Vivienne-wound Assessment Clean;Dry; Intact 1/17/2018  1:00 PM   Shape Irregular 1/17/2018  1:00 PM   Wound Length (cm) 4 5 cm 1/17/2018  1:00 PM   Wound Width (cm) 3 cm 1/17/2018  1:00 PM   Wound Depth (cm) 0 1 1/17/2018  1:00 PM   Calculated Wound Area (cm^2) 13 5 cm^2 1/17/2018  1:00 PM   Calculated Wound Volume (cm^3) 1 35 cm^3 1/17/2018  1:00 PM   Drainage Amount None 1/17/2018  1:00 PM   Treatment Turn & reposition; Offload;Cleansed 1/17/2018  1:00 PM   Dressing Protective barrier 1/17/2018  1:00 PM   Patient Tolerance Tolerated well 1/17/2018  1:00 PM     Cynthia ROBERTS, RN, Wilfrido Durán ()

## 2018-01-17 NOTE — SOCIAL WORK
CM met with patient to discuss dc plan  Patient lives in an apartment on the 2nd floor alone  She is independent with ADLs, uses a rw, no hx of VNA, does not drive  Patient has lots of help from neighbor/friend, Olena who lives in the same apartment building  Patient stated she does not take rx at home, therefore does not have pharmacy she uses  CM spoke with patient's sister, Junior Concepcion ([phone]) to discuss rehab choices  Zeeshan Diaz suggested that CM speak with Olena as she knows more about the patient  CM called patient's friend, Dennis Hector ([phone]) to discuss rehab choices  Olena looked over the list in the patient's room and stated she does not want [facility], she asked that a referral be sent to [facility]  Olena will talk with patient this evening/tomorrow morning for other choices  CM to follow up tomorrow morning

## 2018-01-17 NOTE — PLAN OF CARE
Problem: PHYSICAL THERAPY ADULT  Goal: Performs mobility at highest level of function for planned discharge setting  See evaluation for individualized goals  Treatment/Interventions: Functional transfer training, LE strengthening/ROM, Therapeutic exercise, Endurance training, Patient/family training, Equipment eval/education, Bed mobility, Gait training, Compensatory technique education, Continued evaluation, Spoke to nursing, OT, Spoke to case management  Equipment Recommended: Shyanne Mcknight       See flowsheet documentation for full assessment, interventions and recommendations  Outcome: Progressing  Prognosis: Fair  Problem List: Decreased strength, Decreased endurance, Impaired balance, Decreased mobility, Decreased cognition, Decreased safety awareness, Impaired judgement, Pain  Assessment: Pt continues to demonstrate impaired ability to perform bed mobility, transfers and ambulation, requiring min- mod assist 1-2  Pt progressed with ambulation distances to 20'x2 with much encouragement  PT requires verbal cues for all aspects of mobility  Pt is easily fatigued and requires frequent brief rests t/o session  Pt performed arom to b/l le's with verbal and tactile cues for correct performance and exercise techniques  Pt remained seated out of bed in chair post PT session  Recommend inpt rehab at d/c [facility] to maximize functional mobility and I           Recommendation:  (inpt rehab)     PT - OK to Discharge: Yes (to rehab)    See flowsheet documentation for full assessment

## 2018-01-18 LAB
ANION GAP SERPL CALCULATED.3IONS-SCNC: 9 MMOL/L (ref 4–13)
BUN SERPL-MCNC: 28 MG/DL (ref 5–25)
CALCIUM SERPL-MCNC: 8.5 MG/DL (ref 8.3–10.1)
CHLORIDE SERPL-SCNC: 100 MMOL/L (ref 100–108)
CO2 SERPL-SCNC: 28 MMOL/L (ref 21–32)
CREAT SERPL-MCNC: 1.55 MG/DL (ref 0.6–1.3)
EST. AVERAGE GLUCOSE BLD GHB EST-MCNC: 275 MG/DL
GFR SERPL CREATININE-BSD FRML MDRD: 32 ML/MIN/1.73SQ M
GLUCOSE SERPL-MCNC: 132 MG/DL (ref 65–140)
GLUCOSE SERPL-MCNC: 173 MG/DL (ref 65–140)
GLUCOSE SERPL-MCNC: 322 MG/DL (ref 65–140)
GLUCOSE SERPL-MCNC: 91 MG/DL (ref 65–140)
GLUCOSE SERPL-MCNC: 92 MG/DL (ref 65–140)
HBA1C MFR BLD: 11.2 % (ref 4.2–6.3)
POTASSIUM SERPL-SCNC: 3.5 MMOL/L (ref 3.5–5.3)
SODIUM SERPL-SCNC: 137 MMOL/L (ref 136–145)

## 2018-01-18 PROCEDURE — 94760 N-INVAS EAR/PLS OXIMETRY 1: CPT

## 2018-01-18 PROCEDURE — 94640 AIRWAY INHALATION TREATMENT: CPT

## 2018-01-18 PROCEDURE — 82948 REAGENT STRIP/BLOOD GLUCOSE: CPT

## 2018-01-18 PROCEDURE — 83036 HEMOGLOBIN GLYCOSYLATED A1C: CPT | Performed by: PHYSICIAN ASSISTANT

## 2018-01-18 PROCEDURE — 80048 BASIC METABOLIC PNL TOTAL CA: CPT | Performed by: PHYSICIAN ASSISTANT

## 2018-01-18 RX ORDER — SODIUM CHLORIDE FOR INHALATION 0.9 %
3 VIAL, NEBULIZER (ML) INHALATION
Status: DISCONTINUED | OUTPATIENT
Start: 2018-01-18 | End: 2018-01-24 | Stop reason: HOSPADM

## 2018-01-18 RX ORDER — CITALOPRAM 20 MG/1
10 TABLET ORAL DAILY
Status: DISCONTINUED | OUTPATIENT
Start: 2018-01-18 | End: 2018-01-24 | Stop reason: HOSPADM

## 2018-01-18 RX ORDER — BUDESONIDE AND FORMOTEROL FUMARATE DIHYDRATE 160; 4.5 UG/1; UG/1
2 AEROSOL RESPIRATORY (INHALATION)
Status: DISCONTINUED | OUTPATIENT
Start: 2018-01-18 | End: 2018-01-24 | Stop reason: HOSPADM

## 2018-01-18 RX ORDER — LEVALBUTEROL 1.25 MG/.5ML
1.25 SOLUTION, CONCENTRATE RESPIRATORY (INHALATION) EVERY 6 HOURS PRN
Status: DISCONTINUED | OUTPATIENT
Start: 2018-01-18 | End: 2018-01-18

## 2018-01-18 RX ORDER — INSULIN GLARGINE 100 [IU]/ML
12 INJECTION, SOLUTION SUBCUTANEOUS
Status: DISCONTINUED | OUTPATIENT
Start: 2018-01-18 | End: 2018-01-24 | Stop reason: HOSPADM

## 2018-01-18 RX ORDER — GUAIFENESIN 600 MG
600 TABLET, EXTENDED RELEASE 12 HR ORAL 2 TIMES DAILY
Status: DISCONTINUED | OUTPATIENT
Start: 2018-01-18 | End: 2018-01-24 | Stop reason: HOSPADM

## 2018-01-18 RX ORDER — LEVALBUTEROL 1.25 MG/.5ML
1.25 SOLUTION, CONCENTRATE RESPIRATORY (INHALATION)
Status: DISCONTINUED | OUTPATIENT
Start: 2018-01-18 | End: 2018-01-24 | Stop reason: HOSPADM

## 2018-01-18 RX ADMIN — ACETAMINOPHEN 975 MG: 325 TABLET ORAL at 13:30

## 2018-01-18 RX ADMIN — LEVALBUTEROL HYDROCHLORIDE 1.25 MG: 1.25 SOLUTION, CONCENTRATE RESPIRATORY (INHALATION) at 16:38

## 2018-01-18 RX ADMIN — INSULIN LISPRO 5 UNITS: 100 INJECTION, SOLUTION INTRAVENOUS; SUBCUTANEOUS at 14:16

## 2018-01-18 RX ADMIN — INSULIN LISPRO 1 UNITS: 100 INJECTION, SOLUTION INTRAVENOUS; SUBCUTANEOUS at 21:52

## 2018-01-18 RX ADMIN — Medication 1 TABLET: at 21:51

## 2018-01-18 RX ADMIN — INSULIN GLARGINE 12 UNITS: 100 INJECTION, SOLUTION SUBCUTANEOUS at 21:54

## 2018-01-18 RX ADMIN — BUDESONIDE AND FORMOTEROL FUMARATE DIHYDRATE 2 PUFF: 160; 4.5 AEROSOL RESPIRATORY (INHALATION) at 21:53

## 2018-01-18 RX ADMIN — HEPARIN SODIUM 5000 UNITS: 5000 INJECTION, SOLUTION INTRAVENOUS; SUBCUTANEOUS at 05:38

## 2018-01-18 RX ADMIN — GUAIFENESIN 200 MG: 100 SOLUTION ORAL at 08:58

## 2018-01-18 RX ADMIN — ASPIRIN 81 MG 81 MG: 81 TABLET ORAL at 08:58

## 2018-01-18 RX ADMIN — HEPARIN SODIUM 5000 UNITS: 5000 INJECTION, SOLUTION INTRAVENOUS; SUBCUTANEOUS at 13:30

## 2018-01-18 RX ADMIN — ACETAMINOPHEN 975 MG: 325 TABLET ORAL at 21:51

## 2018-01-18 RX ADMIN — BUDESONIDE AND FORMOTEROL FUMARATE DIHYDRATE 2 PUFF: 160; 4.5 AEROSOL RESPIRATORY (INHALATION) at 13:29

## 2018-01-18 RX ADMIN — ISODIUM CHLORIDE 3 ML: 0.03 SOLUTION RESPIRATORY (INHALATION) at 16:38

## 2018-01-18 RX ADMIN — CHOLECALCIFEROL TAB 25 MCG (1000 UNIT) 1000 UNITS: 25 TAB at 08:58

## 2018-01-18 RX ADMIN — HEPARIN SODIUM 5000 UNITS: 5000 INJECTION, SOLUTION INTRAVENOUS; SUBCUTANEOUS at 21:52

## 2018-01-18 RX ADMIN — GUAIFENESIN 600 MG: 600 TABLET, EXTENDED RELEASE ORAL at 18:15

## 2018-01-18 RX ADMIN — CITALOPRAM HYDROBROMIDE 10 MG: 20 TABLET ORAL at 11:25

## 2018-01-18 RX ADMIN — GUAIFENESIN 600 MG: 600 TABLET, EXTENDED RELEASE ORAL at 11:25

## 2018-01-18 RX ADMIN — ACETAMINOPHEN 975 MG: 325 TABLET ORAL at 05:35

## 2018-01-18 NOTE — CONSULTS
Consult - OB/GYN   Lor Segura 76 y o  female MRN: [id number]  Unit/Bed#: [facility] 2 -01 Encounter: [id number]    Chief complaint:  Vaginal Discharge noted by nurse    HPI:    76 y o  female s/p endotracheal intubation on 1/10/18 for acute hypoxic respiratory failure, multifactorial due to UTI with sepsis, fluid overload and HHNK with a list of comorbid conditions currently being managed, including acute metabolic encephalopathy (back to baseline), cognitive impairment, Diabetes mellitus on Lantus, NSTEMI type II (stable), Acute renal failure, Urinary retention, Depression on Citalopram, Hypokalemia s/p repletion, Dysphagia, Constipation- resolved s/p lactulose x2, and Sinus tachycardia- resolved s/p gentle IVF  Per nurse, pt had white vaginal discharge noted this morning  Pt denied pruritus  When speaking with the patient, she appeared unaware of vaginal discharge  She reported " I was told I have something wrong with my vagina and need it fixed down there " When asked about the vaginal discharge, pt denied any symptoms, such as pruritus and odor and stated it does not bother her  Pt poor historian at this time  Active Problems:  Patient Active Problem List   Diagnosis    Encephalopathy    Hypokalemia    [facility] [address] [facility]    Dehydration    Hypoalbuminemia    Risk for falls    Debility    Sepsis ([facility] [address] [facility]    Acute respiratory failure with hypoxia ([facility] [address] [facility]    NSTEMI (non-ST elevated myocardial infarction) ([facility] [address] [facility]    Diabetes ([facility] [address] )       PMH:  Past Medical History:   Diagnosis Date    Arthritis     Diabetes mellitus ([facility] [address] [facility]     Hypertension     Memory loss        PSH:  History reviewed  No pertinent surgical history  Social Hx:  N/A    Meds:  No current facility-administered medications on file prior to encounter  No current outpatient prescriptions on file prior to encounter  Allergies:   Allergies not on file    Physical Exam:  BP 92/65 (BP Location: Right arm)   Pulse 95   Temp 98 4 °F (36 9 °C) (Temporal)   Resp 18   Ht 5' 6" (1 676 m)   Wt 77 3 kg (170 lb 6 7 oz)   SpO2 95%   BMI 27 51 kg/m²     Physical Exam   Constitutional: No distress  HENT:   Head: Normocephalic and atraumatic  Genitourinary:   Genitourinary Comments: External genitalia appeared normal  No discharge noted at introitus or on michael  White powder appreciated (per nurse, unsure if nystatin)   Neurological: She is alert  Does not appear completely oriented         Assessment:   76 y o  female s/p endotracheal intubation on 1/10/18 for acute hypoxic respiratory failure, multifactorial due to UTI with sepsis, fluid overload and HHNK currently being managed inpatient  Per nurse white, vaginal discharge noted this morning    Plan:   1  Vaginal discharge, most likely normal physiologic discharge  -Noted by nurse, pt unaware and denies any symptoms  -Speculum exam deferred at this time, no discharge noted on external genitalia or on michael    -Advised pt that she can follow-up outpatient with her gynecologist if this persists or does not improve    Discussed with Dr Galina Cabello MD  OBGYN, PGY-1  1/18/2018 12:20 PM

## 2018-01-18 NOTE — PHYSICAL THERAPY NOTE
Attempted to see pt this AM for PT session, however, pt declined to participate in AM session  Ysabel Salmeron, PTA

## 2018-01-18 NOTE — PROGRESS NOTES
Progress Note -  Internal Medicine / Hospitalists  Chicho Krueger 76 y o  female MRN: [id number]  Unit/Bed#: [facility] 2 -01 Encounter: [id number]      ASSESSMENT AND PLAN:  1-Acute hypoxic respiratory failure-requiring endotracheal intubation-successfully extubated on 01/10/2017   multifactorial due to UTI with sepsis, fluid overload and  HHNK    This has resolved, completed 10 d levaquin/cefepine    Currently saturating at 95% on room air  Lungs are coarse and wheezy possible undiagnosed COPD  Will add nebulizers and Symbicort     2-Acute metabolic encephalopathy:improved-secondary to 1 and 5  Improved, mentation is back to baseline      3-Diabetes mellitis presenting with with HHNK-this is now resolved   Patient is on lantus 15u hs SSI  accuchecks  last 24h  Decrease lanuts to 12u hs     4-NSTEMI type II secondary to HHNK and UTI-stable     5-Sepsis with klebsiella UTI-see #1  Compeleted 10d levaquin     6-Acute renal failure with unknown baseline creatinine-2 2 POA; Currently 1 5; previous creatinine 2015 was approx 0 8     7- Urinary Retention - yesterday informed by nurse, pt did not urinate for several days  Coude needed and > 1000cc out  Void trial at [facility]     8-depression-appreciate geriatric input - citalopram 10mg      9-hypokalemia-3 5 today s/p repletion     10-Dysphagia - pureed  Re-eval when dentures are here     11- constipation - now resolved s/p lactulose x 2  Obs series, no signs of obstruction  Lactulose x 2     12-sinus tachycardia-resolved s/p gentle IVF    13-vaginal discharge - consult ob/gyn; per nursing it was white  The patient denies any itching      VTE Prophylaxis: Enoxaparin (Lovenox)    Dispo: Needs rehab, CM following; Appreciate Gyn input on vaginal discharge  ______________________________________________________________________    SUBJECTIVE:   Patient seen and examined  Patient is hungry and she has not had breakfast yet  She denies any abdominal pain    She has had a large bowel movement and feels better  No nausea or vomiting  She denies any fevers or chills  She still is coughing  No substernal chest pain  Mild shortness of breath    OBJECTIVE:   Principal Problem:    Encephalopathy  Active Problems:    Hypokalemia    Acute kidney injury ([address] )    Dehydration    Hypoalbuminemia    Risk for falls    Debility    Sepsis (HCC)    Acute respiratory failure with hypoxia (HCC)    NSTEMI (non-ST elevated myocardial infarction) (HCC)    Diabetes (HCC)      Vitals:   HR:  [95-98] 95  Resp:  [16-18] 18  BP: ()/(50-65) 92/65  SpO2:  [93 %-95 %] 95 %  Temp (24hrs), Av 5 °F (36 9 °C), Min:98 4 °F (36 9 °C), Max:98 8 °F (37 1 °C)  Current: Temperature: 98 4 °F (36 9 °C)    Intake/Output Summary (Last 24 hours) at 18 8167  Last data filed at 18 0530   Gross per 24 hour   Intake                0 ml   Output             1250 ml   Net            -1250 ml       Physical Exam:     General Appearance:    Alert, cooperative, no distress   Head:    Normocephalic, without obvious abnormality, atraumatic   Eyes:    Conjunctiva/corneas clear, EOM's intact       Neck:   Supple, no adenopathy, no JVD   Back:     Symmetric, no curvature, ROM normal, no CVA tenderness   Lungs:     Very coarse with expiratory wheezes throughout   Heart:    Regular rate and rhythm, S1 and S2 normal, no murmur, rub   or gallop   Abdomen:     Soft, mildly diffusely tender, no acute abdomen, bowel sounds active   : vang clear yellos   Extremities:   Extremities normal, atraumatic, no cyanosis or edema   Psych:   Normal Affect   Neurologic:   CNII-XII intact   Weak/deconditioned     Lab, Imaging and other studies:          Results from last 7 days  Lab Units 18  0447   SODIUM mmol/L 137   POTASSIUM mmol/L 3 5   CHLORIDE mmol/L 100   CO2 mmol/L 28   BUN mg/dL 28*   CREATININE mg/dL 1 55*   CALCIUM mg/dL 8 5   GLUCOSE RANDOM mg/dL 91         Lab Results   Component Value Date    BLOODCX No Growth After 5 Days  01/09/2018    BLOODCX No Growth After 5 Days  01/09/2018    BLOODCX Klebsiella pneumoniae (A) 01/07/2018    URINECX 50,000-59,000 cfu/ml Klebsiella pneumoniae (A) 01/07/2018       Scheduled Meds:    acetaminophen 975 mg Oral Q8H [address]   aspirin 81 mg Oral Daily   cholecalciferol 1,000 Units Oral Daily   guaiFENesin 200 mg Oral Q4H   heparin (porcine) 5,000 Units Subcutaneous Q8H [address]   insulin glargine 15 Units Subcutaneous HS   insulin lispro 1-6 Units Subcutaneous TID AC   insulin lispro 1-6 Units Subcutaneous HS   senna-docusate sodium 1 tablet Oral HS       Continuous Infusions:       PRN Meds:  melatonin      Counseling / Coordination of Care  Total floor / unit time spent today 30 minutes  minutes  Greater than 50% of total time was spent with the patient and / or family counseling and / or coordination of care         This note has been constructed using a voice recognition system

## 2018-01-18 NOTE — PROGRESS NOTES
Progress Note - Soco Amato 76 y o  female MRN: [id number]    Unit/Bed#: [place] [address] -01 Encounter: [id number]      Assessment/Plan:  76year old female with:     1  Toxic metabolic encephalopathy/delirium- multifactorial; resolved  Continued supportive care and management of acute and chronic medical conditions per primary team  Delirium precautions as previously outlined       2  Cognitive impairment- likely MCI in setting of #5  Patient would benefit from comprehensive geriatric and formal cognitive assessments outpatient, allowing for full resolution of #1- will place information for [facility] in discharge instructions  Recommend assistance for medication management to ensure compliance       3  Constipation- resolved- management per primary team; agree  Encouraged PO intake and hydration       4  Urinary retention- in the setting of constipation- continued treatment as above  Rutherford catheter placed; recommend voiding trial in next 24-48h following resolution/treatment of constipation  With history of stress urinary incontinence- recommend scheduled toileting Q2-3h while awake      5  Depression with anxiety- continued supportive care; started on citalopram per primary team; agree  If noted to have significant increase in daytime fatigue on citalopram, consider giving QHS, otherwise continue daytime dosing  Monitor, will need close outpatient follow up  Recommend assistance for medication management in this setting to ensure compliance      6  History of fall- PT/OT, fall precautions  Continue Vitamin D supplementation     7  Deconditioning- supportive care; nutritional support  Agree with recommendation for rehab at d/c      8  Visual impairment- supportive care; provide glasses at bedside and encourage use        Patient stable from a geriatric standpoint; will continue to follow peripherally   Please call with questions, we would be happy to reassess patient as needed      Subjective: Patient seen and examined for geriatric follow up  Started on citalopram today per primary team  Also seen by gyn for concern of vaginal discharge; notes reviewed  Patient complains of tiredness and fatigue throughout her hospitalization; she states she sleeps much of the afternoon as well as through the night  +bowel movement yesterday  Denies abdominal pain or discomfort today  All other ROS negative  Objective:     Vitals: Blood pressure 92/65, pulse 95, temperature 98 4 °F (36 9 °C), temperature source Temporal, resp  rate 18, height 5' 6" (1 676 m), weight 77 3 kg (170 lb 6 7 oz), SpO2 95 %  ,Body mass index is 27 51 kg/m²  Intake/Output Summary (Last 24 hours) at 01/18/18 1311  Last data filed at 01/18/18 0530   Gross per 24 hour   Intake                0 ml   Output             1250 ml   Net            -1250 ml       Physical Exam:   Sleeping reclined upright in bed, NAD  Easily awakes to voice  Pleasant, cooperative  NC/AT, MMM, oropharynx clear  RRR +S1 +S2  CTA b/l, no W/R/R  Abdomen soft, mildly distended, nontender +bowel sounds  Oriented to person, place, situation  No gross neuro deficits  Mildly anxious with flat affect, redirectable  Answers questions appropriately and follows commands  Invasive Devices     Peripheral Intravenous Line            Peripheral IV 01/16/18 Left Forearm 2 days          Drain            Urethral Catheter 16 Fr  1 day                Lab, Imaging and other studies: I have personally reviewed pertinent reports      VTE Pharmacologic Prophylaxis: Heparin

## 2018-01-18 NOTE — SOCIAL WORK
BARTOLO met with patient and friend, Olena to discuss additional rehab choices  [facility] unable to [facility]  Patient and Olena decided on [facility] [address], [place], and [facility]  [facility] do not contract with [employer]  [facility] has no available beds  [facility] can accept patient, pending bed availability  Liaison, Bryan Monreal will be in touch tomorrow with bed availability  BARTOLO also provided patient's friend, Olena with [phone] [employer] phone number as she inquired about becoming a caregiver for the patient  BARTOLO called patient's sister, Lyssa Cabral to discuss STR  Patient's sister is willing to sign admission paperwork for patient  CM called  [email or web address] to give sister's contact information  CM to follow

## 2018-01-19 LAB
ANION GAP SERPL CALCULATED.3IONS-SCNC: 9 MMOL/L (ref 4–13)
BUN SERPL-MCNC: 24 MG/DL (ref 5–25)
CALCIUM SERPL-MCNC: 7.5 MG/DL (ref 8.3–10.1)
CHLORIDE SERPL-SCNC: 101 MMOL/L (ref 100–108)
CO2 SERPL-SCNC: 25 MMOL/L (ref 21–32)
CREAT SERPL-MCNC: 1.05 MG/DL (ref 0.6–1.3)
GFR SERPL CREATININE-BSD FRML MDRD: 51 ML/MIN/1.73SQ M
GLUCOSE SERPL-MCNC: 100 MG/DL (ref 65–140)
GLUCOSE SERPL-MCNC: 155 MG/DL (ref 65–140)
GLUCOSE SERPL-MCNC: 229 MG/DL (ref 65–140)
GLUCOSE SERPL-MCNC: 261 MG/DL (ref 65–140)
GLUCOSE SERPL-MCNC: 87 MG/DL (ref 65–140)
POTASSIUM SERPL-SCNC: 3.6 MMOL/L (ref 3.5–5.3)
SODIUM SERPL-SCNC: 135 MMOL/L (ref 136–145)

## 2018-01-19 PROCEDURE — 92526 ORAL FUNCTION THERAPY: CPT

## 2018-01-19 PROCEDURE — 97535 SELF CARE MNGMENT TRAINING: CPT

## 2018-01-19 PROCEDURE — 97110 THERAPEUTIC EXERCISES: CPT

## 2018-01-19 PROCEDURE — 80048 BASIC METABOLIC PNL TOTAL CA: CPT | Performed by: PHYSICIAN ASSISTANT

## 2018-01-19 PROCEDURE — 94640 AIRWAY INHALATION TREATMENT: CPT

## 2018-01-19 PROCEDURE — 97530 THERAPEUTIC ACTIVITIES: CPT

## 2018-01-19 PROCEDURE — 97116 GAIT TRAINING THERAPY: CPT

## 2018-01-19 PROCEDURE — 94760 N-INVAS EAR/PLS OXIMETRY 1: CPT

## 2018-01-19 PROCEDURE — 82948 REAGENT STRIP/BLOOD GLUCOSE: CPT

## 2018-01-19 RX ORDER — POLYETHYLENE GLYCOL 3350 17 G/17G
17 POWDER, FOR SOLUTION ORAL DAILY
Status: DISCONTINUED | OUTPATIENT
Start: 2018-01-19 | End: 2018-01-19

## 2018-01-19 RX ADMIN — ACETAMINOPHEN 975 MG: 325 TABLET ORAL at 21:22

## 2018-01-19 RX ADMIN — CHOLECALCIFEROL TAB 25 MCG (1000 UNIT) 1000 UNITS: 25 TAB at 09:19

## 2018-01-19 RX ADMIN — BUDESONIDE AND FORMOTEROL FUMARATE DIHYDRATE 2 PUFF: 160; 4.5 AEROSOL RESPIRATORY (INHALATION) at 09:00

## 2018-01-19 RX ADMIN — BUDESONIDE AND FORMOTEROL FUMARATE DIHYDRATE 2 PUFF: 160; 4.5 AEROSOL RESPIRATORY (INHALATION) at 21:14

## 2018-01-19 RX ADMIN — LEVALBUTEROL HYDROCHLORIDE 1.25 MG: 1.25 SOLUTION, CONCENTRATE RESPIRATORY (INHALATION) at 08:05

## 2018-01-19 RX ADMIN — ISODIUM CHLORIDE 3 ML: 0.03 SOLUTION RESPIRATORY (INHALATION) at 08:05

## 2018-01-19 RX ADMIN — LEVALBUTEROL HYDROCHLORIDE 1.25 MG: 1.25 SOLUTION, CONCENTRATE RESPIRATORY (INHALATION) at 13:39

## 2018-01-19 RX ADMIN — GUAIFENESIN 600 MG: 600 TABLET, EXTENDED RELEASE ORAL at 17:25

## 2018-01-19 RX ADMIN — ISODIUM CHLORIDE 3 ML: 0.03 SOLUTION RESPIRATORY (INHALATION) at 13:39

## 2018-01-19 RX ADMIN — HEPARIN SODIUM 5000 UNITS: 5000 INJECTION, SOLUTION INTRAVENOUS; SUBCUTANEOUS at 05:34

## 2018-01-19 RX ADMIN — LEVALBUTEROL HYDROCHLORIDE 1.25 MG: 1.25 SOLUTION, CONCENTRATE RESPIRATORY (INHALATION) at 20:33

## 2018-01-19 RX ADMIN — ACETAMINOPHEN 975 MG: 325 TABLET ORAL at 13:31

## 2018-01-19 RX ADMIN — INSULIN GLARGINE 12 UNITS: 100 INJECTION, SOLUTION SUBCUTANEOUS at 21:24

## 2018-01-19 RX ADMIN — GUAIFENESIN 600 MG: 600 TABLET, EXTENDED RELEASE ORAL at 09:19

## 2018-01-19 RX ADMIN — HEPARIN SODIUM 5000 UNITS: 5000 INJECTION, SOLUTION INTRAVENOUS; SUBCUTANEOUS at 21:23

## 2018-01-19 RX ADMIN — CITALOPRAM HYDROBROMIDE 10 MG: 20 TABLET ORAL at 09:19

## 2018-01-19 RX ADMIN — ISODIUM CHLORIDE 3 ML: 0.03 SOLUTION RESPIRATORY (INHALATION) at 20:33

## 2018-01-19 RX ADMIN — ASPIRIN 81 MG 81 MG: 81 TABLET ORAL at 09:19

## 2018-01-19 RX ADMIN — INSULIN LISPRO 2 UNITS: 100 INJECTION, SOLUTION INTRAVENOUS; SUBCUTANEOUS at 21:26

## 2018-01-19 RX ADMIN — ACETAMINOPHEN 975 MG: 325 TABLET ORAL at 05:35

## 2018-01-19 RX ADMIN — Medication 1 TABLET: at 21:22

## 2018-01-19 RX ADMIN — HEPARIN SODIUM 5000 UNITS: 5000 INJECTION, SOLUTION INTRAVENOUS; SUBCUTANEOUS at 13:29

## 2018-01-19 RX ADMIN — INSULIN LISPRO 3 UNITS: 100 INJECTION, SOLUTION INTRAVENOUS; SUBCUTANEOUS at 17:24

## 2018-01-19 NOTE — SPEECH THERAPY NOTE
Speech Language/Pathology    Speech/Language Pathology Progress Note    Patient Name: Pia Villegas  IYVYM'D Date: 1/19/2018     Problem List  Patient Active Problem List   Diagnosis    Encephalopathy    Hypokalemia    Acute [facility] [address] [facility]    Dehydration    Hypoalbuminemia    Risk for falls    Debility    Sepsis ([facility] [address] [facility]    Acute respiratory failure with hypoxia ([facility] [address] [facility]    NSTEMI (non-ST elevated myocardial infarction) ([facility] [address] [facility]    Diabetes ([facility] [address] )        Past Medical History  Past Medical History:   Diagnosis Date    Arthritis     Diabetes mellitus ([facility] [address] [facility]     Hypertension     Memory loss         Past Surgical History  History reviewed  No pertinent surgical history  Subjective:  Pt seen at bedside, pt's best friend present for tx  Objective:  Pt had upper dentures in place for tx  She does not have lower dentures (not before admission either)  Pt drank consecutive sips of thin water by straw, and she ate softened cheerios in milk, 1/2 ham sandwich, and a shortbread cookie  Bolus retrieval and draw from straw was adequate, mastication was mildly slow, as was bolus formation and transfer  No significant oral residue noted  Swallows appear timely, with apparent adequate hyolaryngeal elevation  There were no clinical s/s of aspiration with any items trialed today  Reviewed recommendation for diet upgrade, aspiration and reflux precautions with the pt and her friend  Both verbalized understanding  Sign changed to reflect new diet recs  Spoke with RN and PA regarding diet recs  Assessment:  Mild oral dysphagia, no s/s of aspiration  Pt is appropriate for diet upgrade to dysphagia 3 and continue thin liquids  Plan/Recommendations:  ST to follow up x1 to ensure diet toleration

## 2018-01-19 NOTE — OCCUPATIONAL THERAPY NOTE
Occupational Therapy Treatment Note:         01/19/18 1220   ADL   Where Assessed Chair   Grooming Assistance 5  Supervision/Setup   Grooming Deficit Setup   UB Bathing Assistance 4  Minimal Assistance   UB Bathing Deficit Setup   LB Bathing Assistance 3  Moderate Assistance   LB Bathing Deficit Steadying;Setup;Verbal cueing;Supervision/safety; Increased time to complete   UB Dressing Assistance 4  Minimal Assistance   UB Dressing Deficit Setup   LB Dressing Assistance 4  Minimal Assistance   LB Dressing Deficit Setup;Steadying; Requires assistive device for steadying;Supervision/safety;Verbal cueing; Increased time to complete; Don/doff R sock; Don/doff L sock; Thread RLE into underwear; Thread LLE into underwear;Pull up over hips   Toileting Assistance  4  Minimal Assistance   Toileting Deficit Setup;Steadying;Supervison/safety;Verbal cueing;Clothing management down;Clothing management up;Perineal hygiene; Bedside commode   Transfers   Sit to Stand 4  Minimal assistance   Additional items Assist x 1   Stand to Sit 4  Minimal assistance   Additional items Assist x 1   Stand pivot 4  Minimal assistance   Toilet transfer 4  Minimal assistance   Additional items Assist x 1   Assessment   Assessment Pt was seen for skilled OT with focus on completion of self care tasks, functional transfers and review of RW safety  Pt with improved activity tolerance  Pt able to reach BLE without noted difficulty to don/doff pants and socks  See above levels of A required for all functional tasks  Pt will benefit from further rehab with focus on achieving optimal performance levels with all functional tasks  CRUZ Davila   Plan   Treatment Interventions ADL retraining;Functional transfer training; Endurance training;Cognitive reorientation   Goal Expiration Date 01/23/18   Treatment Day 3   OT Frequency 3-5x/wk   Recommendation   OT Discharge Recommendation Short Term Rehab   Hellen Davila [address]

## 2018-01-19 NOTE — SOCIAL WORK
CM obtained PT/OT notes/ evals for [facility] liasion Candie Schreiber will obtain authorization  CM received call from Michelle Hummel from the patient's insurance company for an update on [employer]  CM informed Michelle Hummel of plan   [phone]

## 2018-01-19 NOTE — PLAN OF CARE
Problem: Potential for Falls  Goal: Patient will remain free of falls  INTERVENTIONS:  - Assess patient frequently for physical needs  -  Identify cognitive and physical deficits and behaviors that affect risk of falls  -  [place]ler fall precautions as indicated by assessment   - Educate patient/family on patient safety including physical limitations  - Instruct patient to call for assistance with activity based on assessment  - Modify environment to reduce risk of injury  - Consider OT/PT consult to assist with strengthening/mobility   Outcome: Progressing      Problem: Prexisting or High Potential for Compromised Skin Integrity  Goal: Skin integrity is maintained or improved  INTERVENTIONS:  - Identify patients at risk for skin breakdown  - Assess and monitor skin integrity  - Assess and monitor nutrition and hydration status  - Monitor labs (i e  albumin)  - Assess for incontinence   - Turn and reposition patient  - Assist with mobility/ambulation  - Relieve pressure over bony prominences  - Avoid friction and shearing  - Provide appropriate hygiene as needed including keeping skin clean and dry  - Evaluate need for skin moisturizer/barrier cream  - Collaborate with interdisciplinary team (i e  Nutrition, Rehabilitation, etc )   - Patient/family teaching   Outcome: Progressing      Problem: Nutrition/Hydration-ADULT  Goal: Nutrient/Hydration intake appropriate for improving, restoring or maintaining nutritional needs  Monitor and assess patient's nutrition/hydration status for malnutrition (ex- brittle hair, bruises, dry skin, pale skin and conjunctiva, muscle wasting, smooth red tongue, and disorientation)  Collaborate with interdisciplinary team and initiate plan and interventions as ordered  Monitor patient's weight and dietary intake as ordered or per policy  Utilize nutrition screening tool and intervene per policy   Determine patient's food preferences and provide high-protein, high-caloric foods as appropriate  INTERVENTIONS:  - Monitor oral intake, urinary output, labs, and treatment plans  - Assess nutrition and hydration status and recommend course of action  - Evaluate amount of meals eaten  - Assist patient with eating if necessary   - Allow adequate time for meals  - Recommend/ encourage appropriate diets, oral nutritional supplements, and vitamin/mineral supplements  - Order, calculate, and assess calorie counts as needed  - Recommend, monitor, and adjust tube feedings and TPN/PPN based on assessed needs  - Assess need for intravenous fluids  - Provide specific nutrition/hydration education as appropriate  - Include patient/family/caregiver in decisions related to nutrition   Outcome: Progressing      Problem: NEUROSENSORY - ADULT  Goal: Achieves stable or improved neurological status  INTERVENTIONS  - Monitor and report changes in neurological status  - Initiate measures to prevent increased intracranial pressure  - Maintain blood pressure and fluid volume within ordered parameters to optimize cerebral perfusion  - Monitor temperature, glucose, and sodium or any other associated labs   Initiate appropriate interventions as ordered  - Monitor for seizure activity   - Administer anti-seizure medications as ordered   Outcome: Progressing      Problem: RESPIRATORY - ADULT  Goal: Achieves optimal ventilation and oxygenation  INTERVENTIONS:  - Assess for changes in respiratory status  - Assess for changes in mentation and behavior  - Position to facilitate oxygenation and minimize respiratory effort  - Oxygen administration by appropriate delivery method based on oxygen saturation (per order) or ABGs  - Initiate smoking cessation education as indicated  - Encourage broncho-pulmonary hygiene including cough, deep breathe, Incentive Spirometry  - Assess the need for suctioning and aspirate as needed  - Assess and instruct to report SOB or any respiratory difficulty  - Respiratory Therapy support as indicated   Outcome: Progressing      Problem: GASTROINTESTINAL - ADULT  Goal: Maintains or returns to baseline bowel function  INTERVENTIONS:  - Assess bowel function  - Encourage oral fluids to ensure adequate hydration  - Administer IV fluids as ordered to ensure adequate hydration  - Administer ordered medications as needed  - Encourage mobilization and activity  - Nutrition services referral to assist patient with appropriate food choices   Outcome: Progressing    Goal: Maintains adequate nutritional intake  INTERVENTIONS:  - Monitor percentage of each meal consumed  - Identify factors contributing to decreased intake, treat as appropriate  - Assist with meals as needed  - Monitor I&O, WT and lab values  - Obtain nutrition services referral as needed   Outcome: Progressing      Problem: METABOLIC, FLUID AND ELECTROLYTES - ADULT  Goal: Electrolytes maintained within normal limits  INTERVENTIONS:  - Monitor labs and assess patient for signs and symptoms of electrolyte imbalances  - Administer electrolyte replacement as ordered  - Monitor response to electrolyte replacements, including repeat lab results as appropriate  - Instruct patient on fluid and nutrition as appropriate   Outcome: Progressing    Goal: Fluid balance maintained  INTERVENTIONS:  - Monitor labs and assess for signs and symptoms of volume excess or deficit  - Monitor I/O and WT  - Instruct patient on fluid and nutrition as appropriate   Outcome: Progressing    Goal: Glucose maintained within target range  INTERVENTIONS:  - Monitor Blood Glucose as ordered  - Assess for signs and symptoms of hyperglycemia and hypoglycemia  - Administer ordered medications to maintain glucose within target range  - Assess nutritional intake and initiate nutrition service referral as needed   Outcome: Progressing      Problem: SKIN/TISSUE INTEGRITY - ADULT  Goal: Skin integrity remains intact  INTERVENTIONS  - Identify patients at risk for skin breakdown  - Assess and monitor skin integrity  - Assess and monitor nutrition and hydration status  - Monitor labs (i e  albumin)  - Assess for incontinence   - Turn and reposition patient  - Assist with mobility/ambulation  - Relieve pressure over bony prominences  - Avoid friction and shearing  - Provide appropriate hygiene as needed including keeping skin clean and dry  - Evaluate need for skin moisturizer/barrier cream  - Collaborate with interdisciplinary team (i e  Nutrition, Rehabilitation, etc )   - Patient/family teaching   Outcome: Progressing      Problem: DISCHARGE PLANNING - CARE MANAGEMENT  Goal: Discharge to post-acute care or home with appropriate resources  INTERVENTIONS:  - Conduct assessment to determine patient/family and health care team treatment goals, and need for post-acute services based on payer coverage, community resources, and patient preferences, and barriers to discharge  - Address psychosocial, clinical, and financial barriers to discharge as identified in assessment in conjunction with the patient/family and health care team  - Arrange appropriate level of post-acute services according to patients   needs and preference and payer coverage in collaboration with the physician and health care team  - Communicate with and update the patient/family, physician, and health care team regarding progress on the discharge plan  - Arrange appropriate transportation to post-acute venues    Outcome: Progressing

## 2018-01-19 NOTE — PROGRESS NOTES
Progress Note -  Internal Medicine / Hospitalists  Abel Leroy 76 y o  female MRN: [id number]  Unit/Bed#: [facility] 68 2 -01 Encounter: [id number]      ASSESSMENT AND PLAN:  1-Acute hypoxic respiratory failure-requiring endotracheal intubation-successfully extubated on 01/10/2017   multifactorial due to UTI with sepsis, fluid overload and  HHNK    This has resolved, completed 10 d levaquin/cefepine    Currently saturating at 94% on room air  Lungs are coarse with faint wheezes possible due to undiagnosed COPD  Improved with Symbicort and nebulizers     2-Acute metabolic encephalopathy:improved-secondary to 1 and 5  Improved, mentation is back to baseline      3-Diabetes mellitis presenting with with HHNK-this is now resolved   Patient is on lantus 15u hs SSI  accuchecks  last 24h  Continue Lantus 12u hs plus SSI     4-NSTEMI type II secondary to HHNK and UTI-stable     5-Sepsis with klebsiella UTI-see #1  Compeleted 10d levaquin     6-Acute renal failure with unknown baseline creatinine-2 2 POA;  wnl limits today; previous creatinine 2015 was approx 0 8     7- Urinary Retention - 2 days ago, Coude inserted  and > 1000cc out  Void trial today     8-depression-appreciate geriatric input - citalopram 10mg      9-hypokalemia-3 6 today s/p repletion     10-Dysphagia - pureed  Re-eval when dentures are here     11- constipation - resolving s/p lactulose x 2  Obs series, no signs of obstruction  Maintain bowel regimen & mobilize     12-sinus tachycardia-resolved s/p gentle IVF     13-vaginal discharge - appreciate consult by gyn; likely normal vaginal discharge, no intervention     VTE Prophylaxis: Enoxaparin (Lovenox)    Dispo: STR recommended, accepted at [facility], but insurance authorization pending  CM following  If remains stable & authorization approved d/c next 24-72h    Updated sister Devon Soriano -[id number]   Please keep informed on d/c plans  ______________________________________________________________________    SUBJECTIVE:   Patient seen and examined  Patient denies any fevers chills  The patient's breathing is improved  She did have a large bowel movement today  She denies any abdominal pain  She further denies any substernal chest pain or palpitations    OBJECTIVE:   Principal Problem:    Encephalopathy  Active Problems:    Hypokalemia    Acute kidney injury ([facility] )    Dehydration    Hypoalbuminemia    Risk for falls    Debility    Sepsis ([facility] )    Acute respiratory failure with hypoxia (HCC)    NSTEMI (non-ST elevated myocardial infarction) ([facility] )    Diabetes ([facility] )      Vitals:   HR:  [] 97  Resp:  [18-19] 19  BP: ()/(52-58) 103/58  SpO2:  [90 %-94 %] 93 %  Temp (24hrs), Av 5 °F (36 9 °C), Min:98 °F (36 7 °C), Max:99 °F (37 2 °C)  Current: Temperature: 98 6 °F (37 °C)    Intake/Output Summary (Last 24 hours) at 18 1404  Last data filed at 18 0658   Gross per 24 hour   Intake                0 ml   Output              850 ml   Net             -850 ml       Physical Exam:     General Appearance:    Alert, cooperative, no distress   Head:    Normocephalic, without obvious abnormality, atraumatic   Eyes:    Conjunctiva/corneas clear, EOM's intact       Neck:   Supple, no adenopathy, no JVD   Back:     Symmetric, no curvature, ROM normal, no CVA tenderness   Lungs:     Coarse with exp wheezes, but improving air exchange overall   Heart:    Regular rate and rhythm, S1 and S2 normal, no murmur, rub   or gallop   Abdomen:     Soft, mildy diffusely tender, no acute abdomen, bowel sounds active, rotund  [place]: clear yellow   Extremities:   Extremities normal, atraumatic, no cyanosis or edema   Psych:   Normal Affect   Neurologic:   CNII-XII intact   Normal strength, sensation and reflexes       Throughout     Lab, Imaging and other studies:          Results from last 7 days  Lab Units 18  0453   SODIUM mmol/L 135* POTASSIUM mmol/L 3 6   CHLORIDE mmol/L 101   CO2 mmol/L 25   BUN mg/dL 24   CREATININE mg/dL 1 05   CALCIUM mg/dL 7 5*   GLUCOSE RANDOM mg/dL 87         Lab Results   Component Value Date    BLOODCX No Growth After 5 Days  01/09/2018    BLOODCX No Growth After 5 Days  01/09/2018    BLOODCX Klebsiella pneumoniae (A) 01/07/2018    URINECX 50,000-59,000 cfu/ml Klebsiella pneumoniae (A) 01/07/2018       Scheduled Meds:    acetaminophen 975 mg Oral Q8H [address]   aspirin 81 mg Oral Daily   budesonide-formoterol 2 puff Inhalation BID   cholecalciferol 1,000 Units Oral Daily   citalopram 10 mg Oral Daily   guaiFENesin 600 mg Oral BID   heparin (porcine) 5,000 Units Subcutaneous Q8H [address]   insulin glargine 12 Units Subcutaneous HS   insulin lispro 1-6 Units Subcutaneous TID AC   insulin lispro 1-6 Units Subcutaneous HS   levalbuterol 1 25 mg Nebulization TID   senna-docusate sodium 1 tablet Oral HS   sodium chloride 3 mL Nebulization TID       Continuous Infusions:       PRN Meds:  melatonin      Counseling / Coordination of Care  Total floor / unit time spent today 30 minutes  minutes  Greater than 50% of total time was spent with the patient and / or family counseling and / or coordination of care         This note has been constructed using a voice recognition system

## 2018-01-19 NOTE — PLAN OF CARE
Problem: OCCUPATIONAL THERAPY ADULT  Goal: Performs self-care activities at highest level of function for planned discharge setting  See evaluation for individualized goals  Treatment Interventions: ADL retraining, Functional transfer training, UE strengthening/ROM, Endurance training, Cognitive reorientation, Patient/family training, Equipment evaluation/education, Compensatory technique education, Continued evaluation          See flowsheet documentation for full assessment, interventions and recommendations  Outcome: Progressing  Limitation: Decreased ADL status, Decreased UE strength, Decreased Safe judgement during ADL, Decreased cognition, Decreased endurance, Decreased high-level ADLs  Prognosis: Fair  Assessment:  Pt was seen for skilled OT with focus on completion of self care tasks, functional transfers and review of RW safety  Pt with improved activity tolerance  Pt able to reach BLE without noted difficulty to don/doff pants and socks  See above levels of A required for all functional tasks  Pt will benefit from further rehab with focus on achieving optimal performance levels with all functional tasks   Hellen Davila [address]     OT Discharge Recommendation: Short Term Rehab         Comments: Hellen Davila [address]

## 2018-01-19 NOTE — PLAN OF CARE
Problem: PHYSICAL THERAPY ADULT  Goal: Performs mobility at highest level of function for planned discharge setting  See evaluation for individualized goals  Treatment/Interventions: Functional transfer training, LE strengthening/ROM, Therapeutic exercise, Endurance training, Patient/family training, Equipment eval/education, Bed mobility, Gait training, Compensatory technique education, Continued evaluation, Spoke to nursing, OT, Spoke to case management  Equipment Recommended: Dajuan De Luna       See flowsheet documentation for full assessment, interventions and recommendations  Outcome: Progressing  Prognosis: Fair  Problem List: Decreased strength, Decreased endurance, Impaired balance, Decreased mobility, Decreased safety awareness, Impaired judgement, Pain  Assessment: Less assistance for all tasks  Stacie for bed mobiltiy, transfers and ambulation  Cues needed for hand placement for safety wtih transitions  Able to ambulate 20 ft with Stacie, gait with deviations of increased forward flexion, decresed foot clearance  Cues to stay within ZHANNA of RW  Tolerated seated ther ex with increased reps  As pt lives alone will continue to require STR at d/c due to impairments in strength, balance, activity tolerance and mobilty  Recommendation: Post acute IP rehab, Short-term skilled PT     PT - OK to Discharge: Yes (to rhb when medically clear )    See flowsheet documentation for full assessment

## 2018-01-20 LAB
ANION GAP SERPL CALCULATED.3IONS-SCNC: 10 MMOL/L (ref 4–13)
BUN SERPL-MCNC: 19 MG/DL (ref 5–25)
CALCIUM SERPL-MCNC: 7.6 MG/DL (ref 8.3–10.1)
CHLORIDE SERPL-SCNC: 98 MMOL/L (ref 100–108)
CO2 SERPL-SCNC: 24 MMOL/L (ref 21–32)
CREAT SERPL-MCNC: 0.81 MG/DL (ref 0.6–1.3)
GFR SERPL CREATININE-BSD FRML MDRD: 69 ML/MIN/1.73SQ M
GLUCOSE SERPL-MCNC: 149 MG/DL (ref 65–140)
GLUCOSE SERPL-MCNC: 164 MG/DL (ref 65–140)
GLUCOSE SERPL-MCNC: 180 MG/DL (ref 65–140)
GLUCOSE SERPL-MCNC: 202 MG/DL (ref 65–140)
GLUCOSE SERPL-MCNC: 319 MG/DL (ref 65–140)
PLATELET # BLD AUTO: 445 THOUSANDS/UL (ref 149–390)
PMV BLD AUTO: 10.6 FL (ref 8.9–12.7)
POTASSIUM SERPL-SCNC: 5 MMOL/L (ref 3.5–5.3)
SODIUM SERPL-SCNC: 132 MMOL/L (ref 136–145)

## 2018-01-20 PROCEDURE — 85049 AUTOMATED PLATELET COUNT: CPT | Performed by: NURSE PRACTITIONER

## 2018-01-20 PROCEDURE — 80048 BASIC METABOLIC PNL TOTAL CA: CPT | Performed by: PHYSICIAN ASSISTANT

## 2018-01-20 PROCEDURE — 94760 N-INVAS EAR/PLS OXIMETRY 1: CPT

## 2018-01-20 PROCEDURE — 82948 REAGENT STRIP/BLOOD GLUCOSE: CPT

## 2018-01-20 PROCEDURE — 94640 AIRWAY INHALATION TREATMENT: CPT

## 2018-01-20 RX ADMIN — INSULIN LISPRO 1 UNITS: 100 INJECTION, SOLUTION INTRAVENOUS; SUBCUTANEOUS at 12:27

## 2018-01-20 RX ADMIN — BUDESONIDE AND FORMOTEROL FUMARATE DIHYDRATE 2 PUFF: 160; 4.5 AEROSOL RESPIRATORY (INHALATION) at 21:13

## 2018-01-20 RX ADMIN — INSULIN LISPRO 1 UNITS: 100 INJECTION, SOLUTION INTRAVENOUS; SUBCUTANEOUS at 09:00

## 2018-01-20 RX ADMIN — LEVALBUTEROL HYDROCHLORIDE 1.25 MG: 1.25 SOLUTION, CONCENTRATE RESPIRATORY (INHALATION) at 08:10

## 2018-01-20 RX ADMIN — INSULIN LISPRO 2 UNITS: 100 INJECTION, SOLUTION INTRAVENOUS; SUBCUTANEOUS at 17:00

## 2018-01-20 RX ADMIN — Medication 1 TABLET: at 22:54

## 2018-01-20 RX ADMIN — HEPARIN SODIUM 5000 UNITS: 5000 INJECTION, SOLUTION INTRAVENOUS; SUBCUTANEOUS at 14:00

## 2018-01-20 RX ADMIN — LEVALBUTEROL HYDROCHLORIDE 1.25 MG: 1.25 SOLUTION, CONCENTRATE RESPIRATORY (INHALATION) at 14:22

## 2018-01-20 RX ADMIN — INSULIN LISPRO 5 UNITS: 100 INJECTION, SOLUTION INTRAVENOUS; SUBCUTANEOUS at 22:55

## 2018-01-20 RX ADMIN — ACETAMINOPHEN 975 MG: 325 TABLET ORAL at 05:38

## 2018-01-20 RX ADMIN — ASPIRIN 81 MG 81 MG: 81 TABLET ORAL at 09:00

## 2018-01-20 RX ADMIN — BUDESONIDE AND FORMOTEROL FUMARATE DIHYDRATE 2 PUFF: 160; 4.5 AEROSOL RESPIRATORY (INHALATION) at 09:00

## 2018-01-20 RX ADMIN — ISODIUM CHLORIDE 3 ML: 0.03 SOLUTION RESPIRATORY (INHALATION) at 08:10

## 2018-01-20 RX ADMIN — ISODIUM CHLORIDE 3 ML: 0.03 SOLUTION RESPIRATORY (INHALATION) at 20:00

## 2018-01-20 RX ADMIN — ISODIUM CHLORIDE 3 ML: 0.03 SOLUTION RESPIRATORY (INHALATION) at 14:22

## 2018-01-20 RX ADMIN — LEVALBUTEROL HYDROCHLORIDE 1.25 MG: 1.25 SOLUTION, CONCENTRATE RESPIRATORY (INHALATION) at 20:00

## 2018-01-20 RX ADMIN — HEPARIN SODIUM 5000 UNITS: 5000 INJECTION, SOLUTION INTRAVENOUS; SUBCUTANEOUS at 05:38

## 2018-01-20 RX ADMIN — HEPARIN SODIUM 5000 UNITS: 5000 INJECTION, SOLUTION INTRAVENOUS; SUBCUTANEOUS at 22:54

## 2018-01-20 RX ADMIN — GUAIFENESIN 600 MG: 600 TABLET, EXTENDED RELEASE ORAL at 09:00

## 2018-01-20 RX ADMIN — INSULIN GLARGINE 12 UNITS: 100 INJECTION, SOLUTION SUBCUTANEOUS at 22:54

## 2018-01-20 RX ADMIN — ACETAMINOPHEN 975 MG: 325 TABLET ORAL at 22:54

## 2018-01-20 RX ADMIN — CITALOPRAM HYDROBROMIDE 10 MG: 20 TABLET ORAL at 09:17

## 2018-01-20 RX ADMIN — CHOLECALCIFEROL TAB 25 MCG (1000 UNIT) 1000 UNITS: 25 TAB at 09:00

## 2018-01-20 NOTE — PROGRESS NOTES
[facility] Progress Note  Patient: Sukhwinder Encarnacion 76 y o  female   MRN: [id number]  PCP: Sharon Aguilera MD  Unit/Bed#: [facility] 2 -01 Encounter: [id number]  Date Of Visit: 01/20/18    Assessment:    Principal Problem:    Encephalopathy  Active Problems:    Hypokalemia    Acute kidney injury ([facility] )    Dehydration    Hypoalbuminemia    Risk for falls    Debility    Sepsis ([facility] )    Acute respiratory failure with hypoxia ([facility] )    NSTEMI (non-ST elevated myocardial infarction) ([facility] )    Diabetes ([facility] )      Plan:    · Encephalopathy improved mentation at baseline presented in relation to respiratory failure and urinary tract infection completed course of antibiotics  · Status post acute hypoxic respiratory failure status post intubation now extubated underlying COPD noted will continue nebulizer therapy  · NSTEMI was felt to be in relation to hyperosmolar state and UTI stable  · Sepsis resolved finished course of antibiotics  · Remains weak and debilitated will need extended care facility pending disposition  · Urine retention undergoing void trial creatinine has improved to 0 81 bladder scan only noted 200 cc residual  · Hyponatremia hyperkalemia today will monitor repeat hyponatremia could be in relation to recent being placed on subtle low plan if continues would discontinue  · Dysphagia on pureed diet  · Constipation resolved      VTE Pharmacologic Prophylaxis:   Pharmacologic: Heparin  Mechanical VTE Prophylaxis in Place: Yes    Discussions with Specialists or Other Care Team Provider: * with nursing staff    Time Spent for Care: 30 minutes  More than 50% of total time spent on counseling and coordination of care as described above  Subjective:   * she had a large bowel movement yesterday and is voiding on her own although still has some residual bladder scan volume of about 200 cc she denies abdominal discomfort    I want to help myself so I can but get better    Objective:     Vitals: Temp (24hrs), Av 9 °F (36 1 °C), Min:96 5 °F (35 8 °C), Max:97 2 °F (36 2 °C)    HR:  [88-93] 88  Resp:  [18-20] 18  BP: ()/(50-86) 118/63  SpO2:  [90 %-93 %] 92 %  Body mass index is 27 51 kg/m²  Input and Output Summary (last 24 hours): Intake/Output Summary (Last 24 hours) at 18 1052  Last data filed at 18 0020   Gross per 24 hour   Intake              180 ml   Output              800 ml   Net             -620 ml       Physical Exam:     Physical Exam:   General appearance: alert, appears stated age and cooperative  Head: Normocephalic, without obvious abnormality, atraumatic  Lungs: clear to auscultation bilaterally  Heart: regular rate and rhythm  Abdomen: soft, non-tender; bowel sounds normal; no masses,  no organomegaly  Back: negative  Extremities: extremities normal, atraumatic, no cyanosis or edema  Neurologic: Grossly normal      Additional Data:     Labs:      Results from last 7 days  Lab Units 18  0619   PLATELETS Thousands/uL 445*       Results from last 7 days  Lab Units 18  0619   SODIUM mmol/L 132*   POTASSIUM mmol/L 5 0   CHLORIDE mmol/L 98*   CO2 mmol/L 24   BUN mg/dL 19   CREATININE mg/dL 0 81   CALCIUM mg/dL 7 6*   GLUCOSE RANDOM mg/dL 149*           * I Have Reviewed All Lab Data Listed Above  * Additional Pertinent Lab Tests Reviewed: All Labs For Current Hospital Admission Reviewed    Imaging:  Ct Abdomen Pelvis Wo Contrast    Result Date: 2018  Narrative: CT ABDOMEN AND PELVIS WITHOUT IV CONTRAST INDICATION:  Abdominal pain COMPARISON: 2015 TECHNIQUE:  CT examination of the abdomen and pelvis was performed without intravenous contrast   Reformatted images were created in axial, sagittal, and coronal planes  Radiation dose length product (DLP) for this visit:  914 mGy-cm     This examination, like all CT scans performed in the [facility], was performed utilizing techniques to minimize radiation dose exposure, including the use of iterative reconstruction and automated exposure control  Enteric contrast was not administered  FINDINGS: ABDOMEN LOWER CHEST:  The distal esophagus appears thick walled  Correlate for esophagitis symptoms or difficulty swallowing  LIVER/BILIARY TREE:  There are calcifications of the liver which are stable, possibly old granulomatous disease  No suspicious new liver findings are appreciated on this noncontrast exam  GALLBLADDER:  Gallbladder is surgically absent  SPLEEN:  Unremarkable  PANCREAS:  Unremarkable  ADRENAL GLANDS:  Unremarkable  KIDNEYS/URETERS:  Unremarkable  No hydronephrosis  STOMACH AND BOWEL:  Stomach is moderately distended with fluid and gas  There is no bowel obstruction  There are some scattered colonic diverticula  No acute diverticulitis  APPENDIX:  No findings to suggest appendicitis  ABDOMINOPELVIC CAVITY:  No ascites or free intraperitoneal air  No lymphadenopathy  VESSELS:  Atherosclerotic changes are present  No evidence of aneurysm  PELVIS REPRODUCTIVE ORGANS:  Unremarkable for patient's age  URINARY BLADDER:  There is a right-sided bladder diverticulum which is stable appearing from the previous study  There is a small bubble of gas in the bladder lumen  Correlate for possible urinary tract infection or any history of instrumentation or catheterization  This exam does not demonstrate any evidence to suggest fistula to the bladder  ABDOMINAL WALL/INGUINAL REGIONS:  Unremarkable  OSSEOUS STRUCTURES:  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  There is also osteoarthritis along the spine  No fractures are seen  Impression: Tiny gas bubble in the urinary bladder  Correlate with urinalysis to exclude UTI  Stable appearing bladder diverticulum  Thickened appearance of the distal esophageal wall which might indicate esophagitis versus less likely neoplasm  Correlate for any pain or difficulty swallowing    Further workup with upper endoscopy or esophagram would be helpful  Colonic diverticulosis without diverticulitis  Workstation performed: [id number]     Xr Chest Portable    Result Date: 1/10/2018  Narrative: CHEST INDICATION:  dyspnea  History taken directly from the electronic ordering system  COMPARISON:  1/7/2018 VIEWS:   AP frontal IMAGES:  1 FINDINGS:     Cardiomediastinal silhouette appears stable  Small bibasilar atelectasis/infiltrate  Visualized osseous structures appear within normal limits for the patient's age  Impression: Small bibasilar atelectasis/infiltrate  Workstation performed: [id number]     Xr Chest Portable    Result Date: 1/7/2018  Narrative: CHEST INDICATION:  Intubation  COMPARISON:  Chest x-ray from 1/7/2018 at 12:02 PM  VIEWS:   AP frontal IMAGES:  1 FINDINGS:  Endotracheal tube has been inserted with tip 5 cm above the ramos  Nasogastric tube has been inserted with tip in the stomach  Cardiomediastinal silhouette appears unremarkable  Unchanged linear atelectasis in the right lower lobe, otherwise clear lungs  No pneumothorax or pleural effusion  Visualized osseous structures appear within normal limits for the patient's age  Impression: Endotracheal tube has been inserted with tip 5 cm above the ramos  Nasogastric tube has been inserted with tip in the stomach  Unchanged linear atelectasis in the right lower lobe, otherwise clear lungs  Workstation performed: [id number]     Xr Chest 1 View Portable    Result Date: 1/7/2018  Narrative: CHEST INDICATION:  Altered mental status COMPARISON:  11/30/2015 VIEWS:   AP frontal IMAGES:  1 FINDINGS:     Heart shadow appears unremarkable  Atherosclerotic vascular calcifications are noted  There is a small infiltrate medially in the right lung base infrahilar location appears new may represent pneumonia  Left lung is clear  No pleural fluid or pneumothorax  Visualized osseous structures appear within normal limits for the patient's age       Impression: Right-sided infiltrate which may represent pneumonia  Recommend follow-up Workstation performed: [id number]     Xr Abdomen Obstruction Series    Result Date: 1/17/2018  Narrative: OBSTRUCTION SERIES INDICATION:  Abdominal tenderness  Absent bowel movement for several days  COMPARISON: CT abdomen pelvis January 7, 2018 VIEWS:  (Supine, erect abdomen and upright chest) IMAGES:  6 FINDINGS: There is a nonobstructive bowel gas pattern  No free air beneath the hemidiaphragms  No pathologic calcifications or soft tissue masses evident  Coude tip catheter coiled in the bladder  Examination of the chest reveals a mildly enlarged cardiomediastinal silhouette  Atherosclerotic changes in the aorta  Lung volumes diminished  Bibasilar atelectasis  Degenerative changes in the bilateral shoulders, lumbar spine and bilateral hips  Impression: Nonobstructive bowel gas pattern  Workstation performed: [id number]     Ct Head Without Contrast    Result Date: 1/7/2018  Narrative: CT BRAIN - WITHOUT CONTRAST INDICATION:  Change in mental status  Nausea  Failure to thrive  COMPARISON:  None  TECHNIQUE:  CT examination of the brain was performed  In addition to axial images, coronal reformatted images were created and submitted for interpretation  Radiation dose length product (DLP) for this visit:  1047 mGy-cm   This examination, like all CT scans performed in the [facility], was performed utilizing techniques to minimize radiation dose exposure, including the use of iterative reconstruction and automated exposure control  IMAGE QUALITY:  Diagnostic  FINDINGS:  PARENCHYMA:  Decreased attenuation is noted in the supratentorial white matter demonstrating an appearance most consistent with moderate microangiopathic change  No intracranial mass, mass effect or midline shift  No CT signs of acute infarction  There is no parenchymal hemorrhage  Atherosclerotic vascular calcifications are noted   VENTRICLES AND EXTRA-AXIAL SPACES:  Prominent commensurate with the degree of volume loss  VISUALIZED ORBITS AND PARANASAL SINUSES:  Orbits appear normal   Mild scattered sinus mucosal thickening is noted  No fluid levels are seen  CALVARIUM AND EXTRACRANIAL SOFT TISSUES:   Normal      Impression: No acute intracranial abnormality  Microangiopathic changes  Workstation performed: [id number]     Imaging Reports Reviewed Today Include: * reviewed all pertinent left radiologic imaging  Imaging Personally Reviewed by Myself Includes:  no  No results found  Recent Cultures (last 7 days):           Last 24 Hours Medication List:     acetaminophen 975 mg Oral Q8H [facility]   aspirin 81 mg Oral Daily   budesonide-formoterol 2 puff Inhalation BID   cholecalciferol 1,000 Units Oral Daily   citalopram 10 mg Oral Daily   guaiFENesin 600 mg Oral BID   heparin (porcine) 5,000 Units Subcutaneous Q8H [facility]   insulin glargine 12 Units Subcutaneous HS   insulin lispro 1-6 Units Subcutaneous TID AC   insulin lispro 1-6 Units Subcutaneous HS   levalbuterol 1 25 mg Nebulization TID   senna-docusate sodium 1 tablet Oral HS   sodium chloride 3 mL Nebulization TID        Today, Patient Was Seen By: Bonnie Stephens MD    ** Please Note: Dragon 360 Dictation voice to text software may have been used in the creation of this document   **

## 2018-01-21 LAB
ANION GAP SERPL CALCULATED.3IONS-SCNC: 10 MMOL/L (ref 4–13)
BUN SERPL-MCNC: 15 MG/DL (ref 5–25)
CALCIUM SERPL-MCNC: 7.8 MG/DL (ref 8.3–10.1)
CHLORIDE SERPL-SCNC: 103 MMOL/L (ref 100–108)
CO2 SERPL-SCNC: 27 MMOL/L (ref 21–32)
CREAT SERPL-MCNC: 0.83 MG/DL (ref 0.6–1.3)
GFR SERPL CREATININE-BSD FRML MDRD: 67 ML/MIN/1.73SQ M
GLUCOSE SERPL-MCNC: 108 MG/DL (ref 65–140)
GLUCOSE SERPL-MCNC: 122 MG/DL (ref 65–140)
GLUCOSE SERPL-MCNC: 141 MG/DL (ref 65–140)
GLUCOSE SERPL-MCNC: 222 MG/DL (ref 65–140)
GLUCOSE SERPL-MCNC: 264 MG/DL (ref 65–140)
POTASSIUM SERPL-SCNC: 3.5 MMOL/L (ref 3.5–5.3)
SODIUM SERPL-SCNC: 140 MMOL/L (ref 136–145)

## 2018-01-21 PROCEDURE — 94760 N-INVAS EAR/PLS OXIMETRY 1: CPT

## 2018-01-21 PROCEDURE — 80048 BASIC METABOLIC PNL TOTAL CA: CPT | Performed by: INTERNAL MEDICINE

## 2018-01-21 PROCEDURE — 82948 REAGENT STRIP/BLOOD GLUCOSE: CPT

## 2018-01-21 PROCEDURE — 94640 AIRWAY INHALATION TREATMENT: CPT

## 2018-01-21 RX ADMIN — ISODIUM CHLORIDE 3 ML: 0.03 SOLUTION RESPIRATORY (INHALATION) at 14:58

## 2018-01-21 RX ADMIN — LEVALBUTEROL HYDROCHLORIDE 1.25 MG: 1.25 SOLUTION, CONCENTRATE RESPIRATORY (INHALATION) at 07:40

## 2018-01-21 RX ADMIN — LEVALBUTEROL HYDROCHLORIDE 1.25 MG: 1.25 SOLUTION, CONCENTRATE RESPIRATORY (INHALATION) at 14:57

## 2018-01-21 RX ADMIN — HEPARIN SODIUM 5000 UNITS: 5000 INJECTION, SOLUTION INTRAVENOUS; SUBCUTANEOUS at 14:24

## 2018-01-21 RX ADMIN — LEVALBUTEROL HYDROCHLORIDE 1.25 MG: 1.25 SOLUTION, CONCENTRATE RESPIRATORY (INHALATION) at 19:12

## 2018-01-21 RX ADMIN — INSULIN LISPRO 2 UNITS: 100 INJECTION, SOLUTION INTRAVENOUS; SUBCUTANEOUS at 17:00

## 2018-01-21 RX ADMIN — CITALOPRAM HYDROBROMIDE 10 MG: 20 TABLET ORAL at 09:30

## 2018-01-21 RX ADMIN — ISODIUM CHLORIDE 3 ML: 0.03 SOLUTION RESPIRATORY (INHALATION) at 07:40

## 2018-01-21 RX ADMIN — ACETAMINOPHEN 975 MG: 325 TABLET ORAL at 21:43

## 2018-01-21 RX ADMIN — GUAIFENESIN 600 MG: 600 TABLET, EXTENDED RELEASE ORAL at 09:30

## 2018-01-21 RX ADMIN — Medication 1 TABLET: at 21:43

## 2018-01-21 RX ADMIN — INSULIN LISPRO 3 UNITS: 100 INJECTION, SOLUTION INTRAVENOUS; SUBCUTANEOUS at 21:49

## 2018-01-21 RX ADMIN — HEPARIN SODIUM 5000 UNITS: 5000 INJECTION, SOLUTION INTRAVENOUS; SUBCUTANEOUS at 21:42

## 2018-01-21 RX ADMIN — CHOLECALCIFEROL TAB 25 MCG (1000 UNIT) 1000 UNITS: 25 TAB at 09:30

## 2018-01-21 RX ADMIN — BUDESONIDE AND FORMOTEROL FUMARATE DIHYDRATE 2 PUFF: 160; 4.5 AEROSOL RESPIRATORY (INHALATION) at 21:04

## 2018-01-21 RX ADMIN — ACETAMINOPHEN 975 MG: 325 TABLET ORAL at 14:23

## 2018-01-21 RX ADMIN — BUDESONIDE AND FORMOTEROL FUMARATE DIHYDRATE 2 PUFF: 160; 4.5 AEROSOL RESPIRATORY (INHALATION) at 09:00

## 2018-01-21 RX ADMIN — ISODIUM CHLORIDE 3 ML: 0.03 SOLUTION RESPIRATORY (INHALATION) at 19:12

## 2018-01-21 RX ADMIN — ASPIRIN 81 MG 81 MG: 81 TABLET ORAL at 09:30

## 2018-01-21 RX ADMIN — INSULIN GLARGINE 12 UNITS: 100 INJECTION, SOLUTION SUBCUTANEOUS at 21:43

## 2018-01-21 RX ADMIN — ACETAMINOPHEN 975 MG: 325 TABLET ORAL at 06:13

## 2018-01-21 RX ADMIN — HEPARIN SODIUM 5000 UNITS: 5000 INJECTION, SOLUTION INTRAVENOUS; SUBCUTANEOUS at 06:13

## 2018-01-21 NOTE — PROGRESS NOTES
[facility] Progress Note  Patient: Ashvin Maxwell 76 y o  female   MRN: [id number]  PCP: Cathy Chase MD  Unit/Bed#: [facility] 2 - Encounter: [id number]  Date Of Visit: 18    Assessment:    Principal Problem:    Encephalopathy  Active Problems:    Hypokalemia    Acute kidney injury ([facility] )    Dehydration    Hypoalbuminemia    Risk for falls    Debility    Sepsis ([facility] )    Acute respiratory failure with hypoxia ([facility] )    NSTEMI (non-ST elevated myocardial infarction) ([facility] )    Diabetes ([facility] )      Plan:    · Encephalopathy improved mentation at baseline presented in relation to respiratory failure and urinary tract infection completed course of antibiotics  · Status post acute hypoxic respiratory failure status post intubation now extubated underlying COPD noted will continue nebulizer therapy  · NSTEMI was felt to be in relation to hyperosmolar state and UTI stable NOT ACS  · Sepsis resolved finished course of antibiotics  · Remains weak and debilitated will need extended care facility pending disposition  · Urine retention undergoing void trial creatinine has improved to 0 81 bladder scan only noted 200 cc residual  · Hyponatremia RESOLVED  · Hypokalemia will replete today  · Dysphagia ,on pureed diet  · Constipation resolved      VTE Pharmacologic Prophylaxis:   Pharmacologic: Heparin  Mechanical VTE Prophylaxis in Place: Yes    Discussions with Specialists or Other Care Team Provider: * with nursing staff    Time Spent for Care: 30 minutes  More than 50% of total time spent on counseling and coordination of care as described above  Subjective:   * she had a large bowel movement yesterday and is voiding on her own although still has some residual bladder scan volume of about 200 cc she denies abdominal discomfort  I want to help myself so I can  get better  [place] [facility] [place]      Objective:     Vitals:   Temp (24hrs), Av 5 °F (36 4 °C), Min:96 7 °F (35 9 °C), Max:97 8 °F (36 6 °C)    HR:  [80-93] 80  Resp:  [18] 18  BP: (112-150)/(55-71) 112/55  SpO2:  [92 %-98 %] 94 %  Body mass index is 27 51 kg/m²  Input and Output Summary (last 24 hours): Intake/Output Summary (Last 24 hours) at 01/21/18 1122  Last data filed at 01/20/18 2315   Gross per 24 hour   Intake                0 ml   Output             1550 ml   Net            -1550 ml       Physical Exam:     Physical Exam:   General appearance: alert, appears stated age and cooperative  Head: Normocephalic, without obvious abnormality, atraumatic  Lungs: clear to auscultation bilaterally  Heart: regular rate and rhythm  Abdomen: soft, non-tender; bowel sounds normal; no masses,  no organomegaly  Back: negative  Extremities: extremities normal, atraumatic, no cyanosis or edema  Neurologic: Grossly normal      Additional Data:     Labs:      Results from last 7 days  Lab Units 01/20/18  0619   PLATELETS Thousands/uL 445*       Results from last 7 days  Lab Units 01/21/18  0624   SODIUM mmol/L 140   POTASSIUM mmol/L 3 5   CHLORIDE mmol/L 103   CO2 mmol/L 27   BUN mg/dL 15   CREATININE mg/dL 0 83   CALCIUM mg/dL 7 8*   GLUCOSE RANDOM mg/dL 122           * I Have Reviewed All Lab Data Listed Above  * Additional Pertinent Lab Tests Reviewed: All Labs For Current Hospital Admission Reviewed    Imaging:  Ct Abdomen Pelvis Wo Contrast    Result Date: 1/7/2018  Narrative: CT ABDOMEN AND PELVIS WITHOUT IV CONTRAST INDICATION:  Abdominal pain COMPARISON: April 2, 2015 TECHNIQUE:  CT examination of the abdomen and pelvis was performed without intravenous contrast   Reformatted images were created in axial, sagittal, and coronal planes  Radiation dose length product (DLP) for this visit:  914 mGy-cm     This examination, like all CT scans performed in the [facility], was performed utilizing techniques to minimize radiation dose exposure, including the use of iterative reconstruction and automated exposure control  Enteric contrast was not administered  FINDINGS: ABDOMEN LOWER CHEST:  The distal esophagus appears thick walled  Correlate for esophagitis symptoms or difficulty swallowing  LIVER/BILIARY TREE:  There are calcifications of the liver which are stable, possibly old granulomatous disease  No suspicious new liver findings are appreciated on this noncontrast exam  GALLBLADDER:  Gallbladder is surgically absent  SPLEEN:  Unremarkable  PANCREAS:  Unremarkable  ADRENAL GLANDS:  Unremarkable  KIDNEYS/URETERS:  Unremarkable  No hydronephrosis  STOMACH AND BOWEL:  Stomach is moderately distended with fluid and gas  There is no bowel obstruction  There are some scattered colonic diverticula  No acute diverticulitis  APPENDIX:  No findings to suggest appendicitis  ABDOMINOPELVIC CAVITY:  No ascites or free intraperitoneal air  No lymphadenopathy  VESSELS:  Atherosclerotic changes are present  No evidence of aneurysm  PELVIS REPRODUCTIVE ORGANS:  Unremarkable for patient's age  URINARY BLADDER:  There is a right-sided bladder diverticulum which is stable appearing from the previous study  There is a small bubble of gas in the bladder lumen  Correlate for possible urinary tract infection or any history of instrumentation or catheterization  This exam does not demonstrate any evidence to suggest fistula to the bladder  ABDOMINAL WALL/INGUINAL REGIONS:  Unremarkable  OSSEOUS STRUCTURES:  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  There is also osteoarthritis along the spine  No fractures are seen  Impression: Tiny gas bubble in the urinary bladder  Correlate with urinalysis to exclude UTI  Stable appearing bladder diverticulum  Thickened appearance of the distal esophageal wall which might indicate esophagitis versus less likely neoplasm  Correlate for any pain or difficulty swallowing  Further workup with upper endoscopy or esophagram would be helpful  Colonic diverticulosis without diverticulitis  Workstation performed: [id number]     Xr Chest Portable    Result Date: 1/10/2018  Narrative: CHEST INDICATION:  dyspnea  History taken directly from the electronic ordering system  COMPARISON:  1/7/2018 VIEWS:   AP frontal IMAGES:  1 FINDINGS:     Cardiomediastinal silhouette appears stable  Small bibasilar atelectasis/infiltrate  Visualized osseous structures appear within normal limits for the patient's age  Impression: Small bibasilar atelectasis/infiltrate  Workstation performed: [id number]     Xr Chest Portable    Result Date: 1/7/2018  Narrative: CHEST INDICATION:  Intubation  COMPARISON:  Chest x-ray from 1/7/2018 at 12:02 PM  VIEWS:   AP frontal IMAGES:  1 FINDINGS:  Endotracheal tube has been inserted with tip 5 cm above the ramos  Nasogastric tube has been inserted with tip in the stomach  Cardiomediastinal silhouette appears unremarkable  Unchanged linear atelectasis in the right lower lobe, otherwise clear lungs  No pneumothorax or pleural effusion  Visualized osseous structures appear within normal limits for the patient's age  Impression: Endotracheal tube has been inserted with tip 5 cm above the ramos  Nasogastric tube has been inserted with tip in the stomach  Unchanged linear atelectasis in the right lower lobe, otherwise clear lungs  Workstation performed: [id number]     Xr Chest 1 View Portable    Result Date: 1/7/2018  Narrative: CHEST INDICATION:  Altered mental status COMPARISON:  11/30/2015 VIEWS:   AP frontal IMAGES:  1 FINDINGS:     Heart shadow appears unremarkable  Atherosclerotic vascular calcifications are noted  There is a small infiltrate medially in the right lung base infrahilar location appears new may represent pneumonia  Left lung is clear  No pleural fluid or pneumothorax  Visualized osseous structures appear within normal limits for the patient's age  Impression: Right-sided infiltrate which may represent pneumonia    Recommend follow-up Workstation performed: [id number]     Xr Abdomen Obstruction Series    Result Date: 1/17/2018  Narrative: OBSTRUCTION SERIES INDICATION:  Abdominal tenderness  Absent bowel movement for several days  COMPARISON: CT abdomen pelvis January 7, 2018 VIEWS:  (Supine, erect abdomen and upright chest) IMAGES:  6 FINDINGS: There is a nonobstructive bowel gas pattern  No free air beneath the hemidiaphragms  No pathologic calcifications or soft tissue masses evident  Coude tip catheter coiled in the bladder  Examination of the chest reveals a mildly enlarged cardiomediastinal silhouette  Atherosclerotic changes in the aorta  Lung volumes diminished  Bibasilar atelectasis  Degenerative changes in the bilateral shoulders, lumbar spine and bilateral hips  Impression: Nonobstructive bowel gas pattern  Workstation performed: [id number]     Ct Head Without Contrast    Result Date: 1/7/2018  Narrative: CT BRAIN - WITHOUT CONTRAST INDICATION:  Change in mental status  Nausea  Failure to thrive  COMPARISON:  None  TECHNIQUE:  CT examination of the brain was performed  In addition to axial images, coronal reformatted images were created and submitted for interpretation  Radiation dose length product (DLP) for this visit:  1047 mGy-cm   This examination, like all CT scans performed in the [facility], was performed utilizing techniques to minimize radiation dose exposure, including the use of iterative reconstruction and automated exposure control  IMAGE QUALITY:  Diagnostic  FINDINGS:  PARENCHYMA:  Decreased attenuation is noted in the supratentorial white matter demonstrating an appearance most consistent with moderate microangiopathic change  No intracranial mass, mass effect or midline shift  No CT signs of acute infarction  There is no parenchymal hemorrhage  Atherosclerotic vascular calcifications are noted  VENTRICLES AND EXTRA-AXIAL SPACES:  Prominent commensurate with the degree of volume loss   VISUALIZED ORBITS AND PARANASAL SINUSES:  Orbits appear normal   Mild scattered sinus mucosal thickening is noted  No fluid levels are seen  CALVARIUM AND EXTRACRANIAL SOFT TISSUES:   Normal      Impression: No acute intracranial abnormality  Microangiopathic changes  Workstation performed: [id number]     Imaging Reports Reviewed Today Include: * reviewed all pertinent left radiologic imaging  Imaging Personally Reviewed by Myself Includes:  no  No results found  Recent Cultures (last 7 days):           Last 24 Hours Medication List:     acetaminophen 975 mg Oral Q8H [address]   aspirin 81 mg Oral Daily   budesonide-formoterol 2 puff Inhalation BID   cholecalciferol 1,000 Units Oral Daily   citalopram 10 mg Oral Daily   guaiFENesin 600 mg Oral BID   heparin (porcine) 5,000 Units Subcutaneous Q8H [address]   insulin glargine 12 Units Subcutaneous HS   insulin lispro 1-6 Units Subcutaneous TID AC   insulin lispro 1-6 Units Subcutaneous HS   levalbuterol 1 25 mg Nebulization TID   senna-docusate sodium 1 tablet Oral HS   sodium chloride 3 mL Nebulization TID        Today, Patient Was Seen By: Sabine Gonzalez MD    ** Please Note: Dragon 360 Dictation voice to text software may have been used in the creation of this document   **

## 2018-01-21 NOTE — PROGRESS NOTES
[facility] Internal Medicine Progress Note  Patient: Fran Sher 76 y o  female   MRN: [id number]  PCP: Sherine Grijalva MD  Unit/Bed#: [facility] 2 -01 Encounter: [id number]  Date Of Visit: 01/21/18    Assessment:    Principal Problem:    Encephalopathy  Active Problems:    Hypokalemia    Acute kidney injury ([facility] )    Dehydration    Hypoalbuminemia    Risk for falls    Debility    Sepsis ([facility] )    Acute respiratory failure with hypoxia ([facility] )    NSTEMI (non-ST elevated myocardial infarction) ([facility] )    Diabetes ([facility] )      Plan:    · Encephalopathy improved mentation at baseline presented in relation to respiratory failure and urinary tract infection completed course of antibiotics  · Status post acute hypoxic respiratory failure status post intubation now extubated underlying COPD noted will continue nebulizer therapy  · NSTEMI was felt to be in relation to hyperosmolar state and UTI stable NOT ACS  · Sepsis resolved finished course of antibiotics  · Remains weak and debilitated will need extended care facility pending disposition  · Urine retention undergoing void trial creatinine has improved to 0 81 but on numerous bladder scans continued to have retention of significant amounts of volume over to 450 cc and numerous straight caths will need to reinsert [place]  · Hyponatremia RESOLVED  · Hypokalemia will replete today  · Dysphagia ,on pureed diet  · Constipation resolved      VTE Pharmacologic Prophylaxis:   Pharmacologic: Heparin  Mechanical VTE Prophylaxis in Place: Yes    Discussions with Specialists or Other Care Team Provider: * with nursing staff    Time Spent for Care: 30 minutes  More than 50% of total time spent on counseling and coordination of care as described above  Subjective:   * she had a large bowel movement yesterday and is voiding on her own although still has some residual bladder scan volume of about 200 cc she denies abdominal discomfort    I want to help myself so I can  get better  She is awaiting disposition to [facility] in a m  Objective:     Vitals:   Temp (24hrs), Av 5 °F (36 4 °C), Min:96 7 °F (35 9 °C), Max:97 8 °F (36 6 °C)    HR:  [80-93] 80  Resp:  [18] 18  BP: (112-150)/(55-71) 112/55  SpO2:  [92 %-98 %] 94 %  Body mass index is 27 51 kg/m²  Input and Output Summary (last 24 hours): Intake/Output Summary (Last 24 hours) at 18 1159  Last data filed at 18 2315   Gross per 24 hour   Intake                0 ml   Output             1550 ml   Net            -1550 ml       Physical Exam:     Physical Exam:   General appearance: alert, appears stated age and cooperative  Head: Normocephalic, without obvious abnormality, atraumatic  Lungs: clear to auscultation bilaterally  Heart: regular rate and rhythm  Abdomen: soft, non-tender; bowel sounds normal; no masses,  no organomegaly  Back: negative  Extremities: extremities normal, atraumatic, no cyanosis or edema  Neurologic: Grossly normal      Additional Data:     Labs:      Results from last 7 days  Lab Units 18  0619   PLATELETS Thousands/uL 445*       Results from last 7 days  Lab Units 18  0624   SODIUM mmol/L 140   POTASSIUM mmol/L 3 5   CHLORIDE mmol/L 103   CO2 mmol/L 27   BUN mg/dL 15   CREATININE mg/dL 0 83   CALCIUM mg/dL 7 8*   GLUCOSE RANDOM mg/dL 122           * I Have Reviewed All Lab Data Listed Above  * Additional Pertinent Lab Tests Reviewed: All Labs For Current Hospital Admission Reviewed    Imaging:  Ct Abdomen Pelvis Wo Contrast    Result Date: 2018  Narrative: CT ABDOMEN AND PELVIS WITHOUT IV CONTRAST INDICATION:  Abdominal pain COMPARISON: 2015 TECHNIQUE:  CT examination of the abdomen and pelvis was performed without intravenous contrast   Reformatted images were created in axial, sagittal, and coronal planes  Radiation dose length product (DLP) for this visit:  914 mGy-cm     This examination, like all CT scans performed in the [facility], was performed utilizing techniques to minimize radiation dose exposure, including the use of iterative reconstruction and automated exposure control  Enteric contrast was not administered  FINDINGS: ABDOMEN LOWER CHEST:  The distal esophagus appears thick walled  Correlate for esophagitis symptoms or difficulty swallowing  LIVER/BILIARY TREE:  There are calcifications of the liver which are stable, possibly old granulomatous disease  No suspicious new liver findings are appreciated on this noncontrast exam  GALLBLADDER:  Gallbladder is surgically absent  SPLEEN:  Unremarkable  PANCREAS:  Unremarkable  ADRENAL GLANDS:  Unremarkable  KIDNEYS/URETERS:  Unremarkable  No hydronephrosis  STOMACH AND BOWEL:  Stomach is moderately distended with fluid and gas  There is no bowel obstruction  There are some scattered colonic diverticula  No acute diverticulitis  APPENDIX:  No findings to suggest appendicitis  ABDOMINOPELVIC CAVITY:  No ascites or free intraperitoneal air  No lymphadenopathy  VESSELS:  Atherosclerotic changes are present  No evidence of aneurysm  PELVIS REPRODUCTIVE ORGANS:  Unremarkable for patient's age  URINARY BLADDER:  There is a right-sided bladder diverticulum which is stable appearing from the previous study  There is a small bubble of gas in the bladder lumen  Correlate for possible urinary tract infection or any history of instrumentation or catheterization  This exam does not demonstrate any evidence to suggest fistula to the bladder  ABDOMINAL WALL/INGUINAL REGIONS:  Unremarkable  OSSEOUS STRUCTURES:  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  There is also osteoarthritis along the spine  No fractures are seen  Impression: Tiny gas bubble in the urinary bladder  Correlate with urinalysis to exclude UTI  Stable appearing bladder diverticulum  Thickened appearance of the distal esophageal wall which might indicate esophagitis versus less likely neoplasm    Correlate for any pain or difficulty swallowing  Further workup with upper endoscopy or esophagram would be helpful  Colonic diverticulosis without diverticulitis  Workstation performed: [id number]     Xr Chest Portable    Result Date: 1/10/2018  Narrative: CHEST INDICATION:  dyspnea  History taken directly from the electronic ordering system  COMPARISON:  1/7/2018 VIEWS:   AP frontal IMAGES:  1 FINDINGS:     Cardiomediastinal silhouette appears stable  Small bibasilar atelectasis/infiltrate  Visualized osseous structures appear within normal limits for the patient's age  Impression: Small bibasilar atelectasis/infiltrate  Workstation performed: [id number]     Xr Chest Portable    Result Date: 1/7/2018  Narrative: CHEST INDICATION:  Intubation  COMPARISON:  Chest x-ray from 1/7/2018 at 12:02 PM  VIEWS:   AP frontal IMAGES:  1 FINDINGS:  Endotracheal tube has been inserted with tip 5 cm above the ramos  Nasogastric tube has been inserted with tip in the stomach  Cardiomediastinal silhouette appears unremarkable  Unchanged linear atelectasis in the right lower lobe, otherwise clear lungs  No pneumothorax or pleural effusion  Visualized osseous structures appear within normal limits for the patient's age  Impression: Endotracheal tube has been inserted with tip 5 cm above the ramos  Nasogastric tube has been inserted with tip in the stomach  Unchanged linear atelectasis in the right lower lobe, otherwise clear lungs  Workstation performed: [id number]     Xr Chest 1 View Portable    Result Date: 1/7/2018  Narrative: CHEST INDICATION:  Altered mental status COMPARISON:  11/30/2015 VIEWS:   AP frontal IMAGES:  1 FINDINGS:     Heart shadow appears unremarkable  Atherosclerotic vascular calcifications are noted  There is a small infiltrate medially in the right lung base infrahilar location appears new may represent pneumonia  Left lung is clear  No pleural fluid or pneumothorax   Visualized osseous structures appear within normal limits for the patient's age  Impression: Right-sided infiltrate which may represent pneumonia  Recommend follow-up Workstation performed: [id number]     Xr Abdomen Obstruction Series    Result Date: 1/17/2018  Narrative: OBSTRUCTION SERIES INDICATION:  Abdominal tenderness  Absent bowel movement for several days  COMPARISON: CT abdomen pelvis January 7, 2018 VIEWS:  (Supine, erect abdomen and upright chest) IMAGES:  6 FINDINGS: There is a nonobstructive bowel gas pattern  No free air beneath the hemidiaphragms  No pathologic calcifications or soft tissue masses evident  Coude tip catheter coiled in the bladder  Examination of the chest reveals a mildly enlarged cardiomediastinal silhouette  Atherosclerotic changes in the aorta  Lung volumes diminished  Bibasilar atelectasis  Degenerative changes in the bilateral shoulders, lumbar spine and bilateral hips  Impression: Nonobstructive bowel gas pattern  Workstation performed: [id number]     Ct Head Without Contrast    Result Date: 1/7/2018  Narrative: CT BRAIN - WITHOUT CONTRAST INDICATION:  Change in mental status  Nausea  Failure to thrive  COMPARISON:  None  TECHNIQUE:  CT examination of the brain was performed  In addition to axial images, coronal reformatted images were created and submitted for interpretation  Radiation dose length product (DLP) for this visit:  1047 mGy-cm   This examination, like all CT scans performed in the [facility], was performed utilizing techniques to minimize radiation dose exposure, including the use of iterative reconstruction and automated exposure control  IMAGE QUALITY:  Diagnostic  FINDINGS:  PARENCHYMA:  Decreased attenuation is noted in the supratentorial white matter demonstrating an appearance most consistent with moderate microangiopathic change  No intracranial mass, mass effect or midline shift  No CT signs of acute infarction  There is no parenchymal hemorrhage    Atherosclerotic vascular calcifications are noted  VENTRICLES AND EXTRA-AXIAL SPACES:  Prominent commensurate with the degree of volume loss  VISUALIZED ORBITS AND PARANASAL SINUSES:  Orbits appear normal   Mild scattered sinus mucosal thickening is noted  No fluid levels are seen  CALVARIUM AND EXTRACRANIAL SOFT TISSUES:   Normal      Impression: No acute intracranial abnormality  Microangiopathic changes  Workstation performed: [id number]     Imaging Reports Reviewed Today Include: * reviewed all pertinent left radiologic imaging  Imaging Personally Reviewed by Myself Includes:  no  No results found  Recent Cultures (last 7 days):           Last 24 Hours Medication List:     acetaminophen 975 mg Oral Q8H [address]   aspirin 81 mg Oral Daily   budesonide-formoterol 2 puff Inhalation BID   cholecalciferol 1,000 Units Oral Daily   citalopram 10 mg Oral Daily   guaiFENesin 600 mg Oral BID   heparin (porcine) 5,000 Units Subcutaneous Q8H [address]   insulin glargine 12 Units Subcutaneous HS   insulin lispro 1-6 Units Subcutaneous TID AC   insulin lispro 1-6 Units Subcutaneous HS   levalbuterol 1 25 mg Nebulization TID   senna-docusate sodium 1 tablet Oral HS   sodium chloride 3 mL Nebulization TID        Today, Patient Was Seen By: Mono Hansen MD    ** Please Note: Dragon 360 Dictation voice to text software may have been used in the creation of this document   **

## 2018-01-22 LAB
GLUCOSE SERPL-MCNC: 181 MG/DL (ref 65–140)
GLUCOSE SERPL-MCNC: 206 MG/DL (ref 65–140)
GLUCOSE SERPL-MCNC: 212 MG/DL (ref 65–140)
GLUCOSE SERPL-MCNC: 95 MG/DL (ref 65–140)

## 2018-01-22 PROCEDURE — 97116 GAIT TRAINING THERAPY: CPT

## 2018-01-22 PROCEDURE — 94640 AIRWAY INHALATION TREATMENT: CPT

## 2018-01-22 PROCEDURE — 97535 SELF CARE MNGMENT TRAINING: CPT

## 2018-01-22 PROCEDURE — 97110 THERAPEUTIC EXERCISES: CPT

## 2018-01-22 PROCEDURE — 94760 N-INVAS EAR/PLS OXIMETRY 1: CPT

## 2018-01-22 PROCEDURE — 92526 ORAL FUNCTION THERAPY: CPT

## 2018-01-22 PROCEDURE — 97530 THERAPEUTIC ACTIVITIES: CPT

## 2018-01-22 PROCEDURE — 82948 REAGENT STRIP/BLOOD GLUCOSE: CPT

## 2018-01-22 RX ADMIN — ACETAMINOPHEN 975 MG: 325 TABLET ORAL at 22:15

## 2018-01-22 RX ADMIN — LEVALBUTEROL HYDROCHLORIDE 1.25 MG: 1.25 SOLUTION, CONCENTRATE RESPIRATORY (INHALATION) at 14:18

## 2018-01-22 RX ADMIN — GUAIFENESIN 600 MG: 600 TABLET, EXTENDED RELEASE ORAL at 09:29

## 2018-01-22 RX ADMIN — Medication 1 TABLET: at 22:21

## 2018-01-22 RX ADMIN — CHOLECALCIFEROL TAB 25 MCG (1000 UNIT) 1000 UNITS: 25 TAB at 09:29

## 2018-01-22 RX ADMIN — LEVALBUTEROL HYDROCHLORIDE 1.25 MG: 1.25 SOLUTION, CONCENTRATE RESPIRATORY (INHALATION) at 08:15

## 2018-01-22 RX ADMIN — BUDESONIDE AND FORMOTEROL FUMARATE DIHYDRATE 2 PUFF: 160; 4.5 AEROSOL RESPIRATORY (INHALATION) at 09:30

## 2018-01-22 RX ADMIN — HEPARIN SODIUM 5000 UNITS: 5000 INJECTION, SOLUTION INTRAVENOUS; SUBCUTANEOUS at 06:55

## 2018-01-22 RX ADMIN — CITALOPRAM HYDROBROMIDE 10 MG: 20 TABLET ORAL at 09:28

## 2018-01-22 RX ADMIN — INSULIN GLARGINE 12 UNITS: 100 INJECTION, SOLUTION SUBCUTANEOUS at 22:17

## 2018-01-22 RX ADMIN — LEVALBUTEROL HYDROCHLORIDE 1.25 MG: 1.25 SOLUTION, CONCENTRATE RESPIRATORY (INHALATION) at 19:01

## 2018-01-22 RX ADMIN — ACETAMINOPHEN 975 MG: 325 TABLET ORAL at 14:00

## 2018-01-22 RX ADMIN — ISODIUM CHLORIDE 3 ML: 0.03 SOLUTION RESPIRATORY (INHALATION) at 08:15

## 2018-01-22 RX ADMIN — INSULIN LISPRO 1 UNITS: 100 INJECTION, SOLUTION INTRAVENOUS; SUBCUTANEOUS at 22:16

## 2018-01-22 RX ADMIN — ASPIRIN 81 MG 81 MG: 81 TABLET ORAL at 09:30

## 2018-01-22 RX ADMIN — GUAIFENESIN 600 MG: 600 TABLET, EXTENDED RELEASE ORAL at 17:44

## 2018-01-22 RX ADMIN — HEPARIN SODIUM 5000 UNITS: 5000 INJECTION, SOLUTION INTRAVENOUS; SUBCUTANEOUS at 22:17

## 2018-01-22 RX ADMIN — BUDESONIDE AND FORMOTEROL FUMARATE DIHYDRATE 2 PUFF: 160; 4.5 AEROSOL RESPIRATORY (INHALATION) at 19:44

## 2018-01-22 RX ADMIN — ISODIUM CHLORIDE 3 ML: 0.03 SOLUTION RESPIRATORY (INHALATION) at 14:18

## 2018-01-22 RX ADMIN — ISODIUM CHLORIDE 3 ML: 0.03 SOLUTION RESPIRATORY (INHALATION) at 19:01

## 2018-01-22 RX ADMIN — INSULIN LISPRO 2 UNITS: 100 INJECTION, SOLUTION INTRAVENOUS; SUBCUTANEOUS at 17:46

## 2018-01-22 RX ADMIN — ACETAMINOPHEN 975 MG: 325 TABLET ORAL at 07:38

## 2018-01-22 NOTE — SPEECH THERAPY NOTE
Speech Language/Pathology    Speech/Language Pathology Progress Note    Patient Name: Jeni Han  YFTMB'E Date: 1/22/2018     Problem List  Patient Active Problem List   Diagnosis    Encephalopathy    Hypokalemia    [facility]    Dehydration    Hypoalbuminemia    Risk for falls    Debility    Sepsis ([facility] )    Acute respiratory failure with hypoxia ([facility] )    [facility]    Diabetes [facility]        Past Medical History  Past Medical History:   Diagnosis Date    Arthritis     Diabetes mellitus ([facility] )     Hypertension     Memory loss         Past Surgical History  History reviewed  No pertinent surgical history  Subjective:  Pt seen this morning for dysphagia tx  Pt sitting up at side of bed, pleasant and cooperative, mildly confused at times  Objective:  Pt fed herself scrambled eggs, and a blueberry muffin  She drank 4 oz oj by straw, with consecutive swallows  Bolus retrieval and draw from straw was adequate  Mastication was mildly prolonged (pt only has upper dentures, and is edentulous on bottom)  Bolus formation and transfer appear WFL  Swallows were timely, with hyoaryngeal elevation appearing WFL  There were no clinical s/s of aspiration  Pt did spit out a few blueberries that she stated were too hard for her to masticate  Pt reports that she does not eat hard to chew foods at home, including lettuce, dry, tough meat, etc    Pt was educated on current diet of dysphagia 3 foods, and encouraged to continue with soft easy to chew foods at home, always sit upright for PO intake, and to take her time while eating  She verbalized and demonstrated understanding  Assessment:  Good toleration of dysphagia 3 diet and thin liquids  There were no s/s of aspiration  This appears to be appropriate diet for discharge, and is reportedly baseline for pt  Plan/Recommendations:  Continue dysphagia 3 diet and thin liquids    No further ST warranted at this time    Discharge from [address] [place] [address]

## 2018-01-22 NOTE — PHYSICAL THERAPY NOTE
Physical Therapy Treatment Note       01/22/18 1215   Pain Assessment   Pain Assessment No/denies pain   Restrictions/Precautions   Weight Bearing Precautions Per Order No   Other Precautions Chair Alarm;Cognitive; Fall Risk  ((+) taj   )   General   Chart Reviewed Yes   Response to Previous Treatment Patient with no complaints from previous session  Family/Caregiver Present No   Cognition   Overall Cognitive Status Impaired   Arousal/Participation Alert; Cooperative   Attention Attends with cues to redirect   Orientation Level Oriented to person;Oriented to place;Oriented to situation   Following Commands Follows one step commands with increased time or repetition   Subjective   Subjective PT offers no complaints  Bed Mobility   Supine to Sit 5  Supervision   Additional items Assist x 1;HOB elevated; Bedrails; Increased time required;Verbal cues   Transfers   Sit to Stand 4  Minimal assistance   Additional items Assist x 1; Armrests; Increased time required;Verbal cues   Stand to Sit 4  Minimal assistance   Additional items Assist x 1; Armrests; Increased time required;Verbal cues   Balance   Static Sitting Fair +   Dynamic Sitting Fair   Static Standing Fair -   Ambulatory Poor +   Endurance Deficit   Endurance Deficit Yes   Endurance Deficit Description (fatigue)   Activity Tolerance   Activity Tolerance Patient limited by fatigue   Nurse Made Aware Lidia LEE   Exercises   Hip Flexion Sitting;15 reps;AROM; Bilateral   Hip Abduction Sitting;15 reps;AROM; Bilateral   Hip Adduction Sitting;15 reps;AROM; Bilateral  (isometric hip add and arom adduction  )   Knee AROM Long Arc Quad Sitting;15 reps;AROM; Bilateral   Ankle Pumps Sitting;15 reps;AROM; Bilateral   Assessment   Prognosis Fair   Problem List Decreased strength;Decreased endurance; Impaired balance;Decreased mobility; Decreased safety awareness; Impaired judgement;Pain   Assessment Less assistance for bed mobility, pT progressed to supervision with bed mobility, and continues to require min assist for transfers and ambulation  PT progressed with ambulation distances to 100' with min assist and 2 standing rest breaks  Pt requires verbal cues for improved posture and safety awareness during transfers and ambulation  Pt requires cues to step up into walker bounds and maintain close distance to walker at all times and cues to maintain use of walker at all times especially when turning and backing up to the chair to sit  Pt  Requires brief rests between sets of b/l le arom exercises and cues for correct exercise techniques  Pt remains at increased risk for falls due to distractability and decreased focus to task and attention  PT will benefit from inpt rehab at d/c [facility] to maximize safety, functional mobility and I     Goals   Patient Goals to get stronger   STG Expiration Date 01/26/18   Treatment Day 4   Plan   Treatment/Interventions Functional transfer training;LE strengthening/ROM; Endurance training; Therapeutic exercise;Patient/family training;Equipment eval/education; Bed mobility;Gait training;Spoke to nursing;OT   Progress Progressing toward goals   Recommendation   Recommendation Post acute IP rehab   PT - OK to Discharge Yes  (rehab)       Jennifer Qureshi PTA

## 2018-01-22 NOTE — OCCUPATIONAL THERAPY NOTE
Occupational Therapy Treatment Note:         01/22/18 1225   Restrictions/Precautions   Weight Bearing Precautions Per Order No   Other Precautions Fall Risk; Chair Alarm;Pain;Bed Alarm   Pain Assessment   Pain Assessment No/denies pain   Pain Score No Pain   Pain Type Acute pain   Pain Location Back   Pain Orientation Bilateral   ADL   Where Assessed Chair   Grooming Assistance 5  Supervision/Setup   Grooming Deficit Steadying;Verbal cueing;Supervision/safety; Increased time to complete;Wash/dry hands; Wash/dry face   LB Dressing Assistance 3  Moderate Assistance   LB Dressing Deficit Setup;Steadying; Requires assistive device for steadying;Verbal cueing;Supervision/safety; Increased time to complete; Don/doff R sock; Don/doff L sock; Thread LLE into underwear; Thread RLE into underwear;Pull up over hips   LB Dressing Comments cues required to achieve afety with activity  Functional Standing Tolerance   Time 5 mins  Activity functional mobility   Comments Pt with increased fatigue following activity    Bed Mobility   Rolling R 4  Minimal assistance   Additional items Assist x 1; Increased time required;Verbal cues; Bedrails;LE management   Rolling L 4  Minimal assistance   Additional items Assist x 1; Increased time required;LE management;Verbal cues; Bedrails   Supine to Sit 4  Minimal assistance   Additional items Assist x 1;Bedrails; Increased time required;Verbal cues;LE management   Sit to Supine 4  Minimal assistance   Additional items Assist x 1; Increased time required;Verbal cues;LE management; Bedrails   Additional Comments Increased A required to bring self to EOB  Transfers   Sit to Stand 4  Minimal assistance   Additional items Assist x 1; Increased time required;Armrests; Verbal cues   Stand to Sit 4  Minimal assistance   Additional items Assist x 1; Armrests; Increased time required;Verbal cues   Stand pivot 4  Minimal assistance   Additional items Assist x 1; Increased time required;Verbal cues;Armrests Functional Mobility   Functional Mobility 4  Minimal assistance   Additional Comments x1   Additional items Rolling walker   Cognition   Overall Cognitive Status Impaired   Arousal/Participation Alert; Cooperative   Attention Attends with cues to redirect   Orientation Level Oriented to person;Oriented to place   Memory Decreased short term memory;Decreased recall of precautions;Decreased recall of recent events   Following Commands Follows one step commands with increased time or repetition   Comments Pt with poor ST memory with review of current plan of care and review of current medical status  Cognition Assessment Tools ACLS   Score 4   Activity Tolerance   Activity Tolerance Patient limited by fatigue   Medical Staff Made Aware Reported all findings to nursing staff  Pt OOB with chair alarm in place  Assessment   Assessment Pt was seen for skilled OT with focus on completion of self care tasks, bed mobility, review of current plan of care and completion of the [facility] Cognitive Level Screening  Pt scored 4 0/6 0 on the ACLS indicating 24 hour supervision is recommended to remove dangerous objects and solve problems due to minor changes in routine activities  Behavior:  May be disoriented to day/date  Memorization of new tasks will be extremely slow  Long term repetitive training is required for all new tasks  Allow 2-3x average rate for completion of tasks  Medications:  Initiates taking familiar dose of medication at regular time of day  Recognizes prescriptions and may recognize a problem, but will not seek assistance  May not understand need or purpose of medications  Changes in routine will confuse the individual and disrupt compliance  Provide frequent supervision  Pt will benefit from further rehab with focus on achieving optmal performance levels with all functional tasks  Plan   Treatment Interventions ADL retraining;Functional transfer training; Endurance training   Goal Expiration Date 01/23/18   Treatment Day 4   OT Frequency 3-5x/wk   Recommendation   OT Discharge Recommendation Short Term Rehab   Yvette Eubanks

## 2018-01-22 NOTE — PROGRESS NOTES
Progress Note -  Internal Medicine / Hospitalists  Ainsley Mckenna 76 y o  female MRN: [id number]  Unit/Bed#: [place] [address] -01 Encounter: [id number]      ASSESSMENT AND PLAN:  1-Acute hypoxic respiratory failure-requiring endotracheal intubation-successfully extubated on 01/10/2017   multifactorial due to UTI with sepsis, fluid overload and  HHNK    This has resolved, completed 10 d levaquin/cefepine    Currently saturating at 96% on room air   Probable undiagnosed COPD contributing   Continue Symbicort and nebulizers     2-Acute metabolic encephalopathy:improved-secondary to 1 and 5  Improved, mentation is back to baseline      3-Diabetes mellitis presenting with with HHNK-this is now resolved  Accuchecks  last 24h  Now on lantus 12u hs plus SSI     4-NSTEMI type II secondary to HHNK and UTI-stable  Not ACS     5-Sepsis with klebsiella UTI-see #1  Compeleted 10d levaquin     6-Acute renal failure with unknown baseline creatinine-2 2 POA;  now wnl      7- Urinary Retention - failed voiding trial & Rutherford re-inserted  Follow up with Urology     8-depression-appreciate geriatric input - citalopram 10mg      9-hypokalemia- stable s/p repletion     10-Dysphagia - dental soft/thin liquids     11- constipation - resolved  Maintain bowel regimen & mobilize       VTE Prophylaxis: Heparin    Dispo: d/w [facility]/Nilsa CASTRO Care needs PT/OT re-eval  Hopeful d/c next 24 hours  ______________________________________________________________________    SUBJECTIVE:   Patient seen and examined  Patient complains of fatigue  Mild abdominal pain, discussed with nursing the patient is moving her bowels  No nausea or vomiting  The patient denies any fevers chills  The patient further denies any chest pain shortness of breath or palpitations      OBJECTIVE:   Principal Problem:    Encephalopathy  Active Problems:    Hypokalemia    Acute kidney injury ([address] )    Dehydration    Hypoalbuminemia    Risk for falls    Debility    Sepsis ([facility] )    Acute respiratory failure with hypoxia ([facility] )    NSTEMI (non-ST elevated myocardial infarction) ([facility] )    Diabetes ([place] [address] )      Vitals:   HR:  [69-87] 87  Resp:  [18-20] 19  BP: (134-151)/(60-69) 151/68  SpO2:  [92 %-96 %] 96 %  Temp (24hrs), Av 3 °F (36 3 °C), Min:96 7 °F (35 9 °C), Max:98 2 °F (36 8 °C)  Current: Temperature: 98 2 °F (36 8 °C)    Intake/Output Summary (Last 24 hours) at 18 1033  Last data filed at 18 1830   Gross per 24 hour   Intake                0 ml   Output             1000 ml   Net            -1000 ml       Physical Exam:     General Appearance:    Alert, cooperative, no distress   Head:    Normocephalic, without obvious abnormality, atraumatic   Eyes:    Conjunctiva/corneas clear, EOM's intact       Neck:   Supple, no adenopathy, no JVD   Back:     Symmetric, no curvature, ROM normal, no CVA tenderness   Lungs:     Coarse but Clear to auscultation bilaterally, no wheezing or rhonchi   Heart:    Regular rate and rhythm, S1 and S2 normal, no murmur, rub   or gallop   Abdomen:     Soft, mildy diffusely tender, bowel sounds active, rotund  : vang clear yellow   Extremities:   Extremities normal, atraumatic, no cyanosis or edema   Psych:   Flat Affect   Neurologic:   CNII-XII intact  Weak/deconditioned     Lab, Imaging and other studies:      Results from last 7 days  Lab Units 18  0619   PLATELETS Thousands/uL 445*       Results from last 7 days  Lab Units 18  0624   SODIUM mmol/L 140   POTASSIUM mmol/L 3 5   CHLORIDE mmol/L 103   CO2 mmol/L 27   BUN mg/dL 15   CREATININE mg/dL 0 83   CALCIUM mg/dL 7 8*   GLUCOSE RANDOM mg/dL 122         Lab Results   Component Value Date    BLOODCX No Growth After 5 Days  2018    BLOODCX No Growth After 5 Days   2018    BLOODCX Klebsiella pneumoniae (A) 2018    URINECX 50,000-59,000 cfu/ml Klebsiella pneumoniae (A) 2018       Scheduled Meds:    acetaminophen 975 mg Oral Q8H SERGIO   aspirin 81 mg Oral Daily   budesonide-formoterol 2 puff Inhalation BID   cholecalciferol 1,000 Units Oral Daily   citalopram 10 mg Oral Daily   guaiFENesin 600 mg Oral BID   heparin (porcine) 5,000 Units Subcutaneous Q8H [address]   insulin glargine 12 Units Subcutaneous HS   insulin lispro 1-6 Units Subcutaneous TID AC   insulin lispro 1-6 Units Subcutaneous HS   levalbuterol 1 25 mg Nebulization TID   senna-docusate sodium 1 tablet Oral HS   sodium chloride 3 mL Nebulization TID       Continuous Infusions:       PRN Meds:  melatonin      Counseling / Coordination of Care  Total floor / unit time spent today 30 minutes  minutes  Greater than 50% of total time was spent with the patient and / or family counseling and / or coordination of care         This note has been constructed using a voice recognition system

## 2018-01-22 NOTE — CASE MANAGEMENT
Continued Stay Review    Date: 01/21/18    Vital Signs: /68 (BP Location: Right arm)   Pulse 87   Temp 98 2 °F (36 8 °C) (Tympanic)   Resp 19   Ht 5' 6" (1 676 m)   Wt 77 3 kg (170 lb 6 7 oz)   SpO2 96%   BMI 27 51 kg/m²     Medications:   Scheduled Meds:   acetaminophen 975 mg Oral Q8H SERGIO   aspirin 81 mg Oral Daily   budesonide-formoterol 2 puff Inhalation BID   cholecalciferol 1,000 Units Oral Daily   citalopram 10 mg Oral Daily   guaiFENesin 600 mg Oral BID   heparin (porcine) 5,000 Units Subcutaneous Q8H [facility]   insulin glargine 12 Units Subcutaneous HS   insulin lispro 1-6 Units Subcutaneous TID AC   insulin lispro 1-6 Units Subcutaneous HS   levalbuterol 1 25 mg Nebulization TID   senna-docusate sodium 1 tablet Oral HS   sodium chloride 3 mL Nebulization TID     Continuous Infusions:    PRN Meds: melatonin    Abnormal Labs/Diagnostic Results:   PLATELETS 645*     CALCIUM 7 8*     Age/Sex: 76 y o  female     Assessment:     Principal Problem:    Encephalopathy  Active Problems:    Hypokalemia    Acute kidney injury ([facility] )    Dehydration    Hypoalbuminemia    Risk for falls    Debility    Sepsis (HCC)    Acute respiratory failure with hypoxia (HCC)    NSTEMI (non-ST elevated myocardial infarction) ([facility] )    Diabetes ([facility] )        Plan:     · Encephalopathy improved mentation at baseline presented in relation to respiratory failure and urinary tract infection completed course of antibiotics  · Status post acute hypoxic respiratory failure status post intubation now extubated underlying COPD noted will continue nebulizer therapy  · NSTEMI was felt to be in relation to hyperosmolar state and UTI stable NOT ACS  · Sepsis resolved finished course of antibiotics  · Remains weak and debilitated will need extended care facility pending disposition  · Urine retention undergoing void trial creatinine has improved to 0 81 but on numerous bladder scans continued to have retention of significant amounts of volume over to 450 cc and numerous straight caths will need to reinsert Rutherford  · Hyponatremia RESOLVED  · Hypokalemia will replete today  · Dysphagia ,on pureed diet  · Constipation resolved        VTE Pharmacologic Prophylaxis:   Pharmacologic: Heparin  Mechanical VTE Prophylaxis in Place: Yes       Discharge Plan: TBD      Thank you,  [facility] in the [facility] by Vel Dempsey for 2017  Network Utilization Review Department  Phone: [phone]; Fax [phone]  ATTENTION: The Network Utilization Review Department is now centralized for our 7 Facilities  Make a note that we have a new phone and fax numbers for our Department  Please call with any questions or concerns to [phone] and carefully follow the prompts so that you are directed to the right person  All voicemails are confidential  Fax any determinations, approvals, denials, and requests for initial or continue stay review clinical to [phone]  Due to HIGH CALL volume, it would be easier if you could please send faxed requests to expedite your requests and in part, help us provide discharge notifications faster

## 2018-01-22 NOTE — PLAN OF CARE
Problem: PHYSICAL THERAPY ADULT  Goal: Performs mobility at highest level of function for planned discharge setting  See evaluation for individualized goals  Treatment/Interventions: Functional transfer training, LE strengthening/ROM, Therapeutic exercise, Endurance training, Patient/family training, Equipment eval/education, Bed mobility, Gait training, Compensatory technique education, Continued evaluation, Spoke to nursing, OT, Spoke to case management  Equipment Recommended: Enoc Paiz       See flowsheet documentation for full assessment, interventions and recommendations  Outcome: Progressing  Prognosis: Fair  Problem List: Decreased strength, Decreased endurance, Impaired balance, Decreased mobility, Decreased safety awareness, Impaired judgement, Pain  Assessment: Less assistance for bed mobility, pT progressed to supervision with bed mobility, and continues to require min assist for transfers and ambulation  PT progressed with ambulation distances to 100' with min assist and 2 standing rest breaks  Pt requires verbal cues for improved posture and safety awareness during transfers and ambulation  Pt requires cues to step up into walker bounds and maintain close distance to walker at all times and cues to maintain use of walker at all times especially when turning and backing up to the chair to sit  Pt  Requires brief rests between sets of b/l le arom exercises and cues for correct exercise techniques  Pt remains at increased risk for falls due to distractability and decreased focus to task and attention  PT will benefit from inpt rehab at d/c [facility] to maximize safety, functional mobility and I          Recommendation: Post acute IP rehab     PT - OK to Discharge: Yes (rehab)    See flowsheet documentation for full assessment

## 2018-01-23 LAB
GLUCOSE SERPL-MCNC: 100 MG/DL (ref 65–140)
GLUCOSE SERPL-MCNC: 124 MG/DL (ref 65–140)
GLUCOSE SERPL-MCNC: 197 MG/DL (ref 65–140)
GLUCOSE SERPL-MCNC: 207 MG/DL (ref 65–140)

## 2018-01-23 PROCEDURE — 94640 AIRWAY INHALATION TREATMENT: CPT

## 2018-01-23 PROCEDURE — 94760 N-INVAS EAR/PLS OXIMETRY 1: CPT

## 2018-01-23 PROCEDURE — 82948 REAGENT STRIP/BLOOD GLUCOSE: CPT

## 2018-01-23 RX ADMIN — CITALOPRAM HYDROBROMIDE 10 MG: 20 TABLET ORAL at 09:13

## 2018-01-23 RX ADMIN — ISODIUM CHLORIDE 3 ML: 0.03 SOLUTION RESPIRATORY (INHALATION) at 20:18

## 2018-01-23 RX ADMIN — HEPARIN SODIUM 5000 UNITS: 5000 INJECTION, SOLUTION INTRAVENOUS; SUBCUTANEOUS at 21:42

## 2018-01-23 RX ADMIN — ACETAMINOPHEN 975 MG: 325 TABLET ORAL at 21:42

## 2018-01-23 RX ADMIN — ACETAMINOPHEN 975 MG: 325 TABLET ORAL at 05:19

## 2018-01-23 RX ADMIN — GUAIFENESIN 600 MG: 600 TABLET, EXTENDED RELEASE ORAL at 09:13

## 2018-01-23 RX ADMIN — LEVALBUTEROL HYDROCHLORIDE 1.25 MG: 1.25 SOLUTION, CONCENTRATE RESPIRATORY (INHALATION) at 20:18

## 2018-01-23 RX ADMIN — GUAIFENESIN 600 MG: 600 TABLET, EXTENDED RELEASE ORAL at 17:36

## 2018-01-23 RX ADMIN — INSULIN LISPRO 2 UNITS: 100 INJECTION, SOLUTION INTRAVENOUS; SUBCUTANEOUS at 17:36

## 2018-01-23 RX ADMIN — Medication 1 TABLET: at 21:42

## 2018-01-23 RX ADMIN — CHOLECALCIFEROL TAB 25 MCG (1000 UNIT) 1000 UNITS: 25 TAB at 09:13

## 2018-01-23 RX ADMIN — HEPARIN SODIUM 5000 UNITS: 5000 INJECTION, SOLUTION INTRAVENOUS; SUBCUTANEOUS at 15:00

## 2018-01-23 RX ADMIN — INSULIN GLARGINE 12 UNITS: 100 INJECTION, SOLUTION SUBCUTANEOUS at 21:42

## 2018-01-23 RX ADMIN — HEPARIN SODIUM 5000 UNITS: 5000 INJECTION, SOLUTION INTRAVENOUS; SUBCUTANEOUS at 05:18

## 2018-01-23 RX ADMIN — BUDESONIDE AND FORMOTEROL FUMARATE DIHYDRATE 2 PUFF: 160; 4.5 AEROSOL RESPIRATORY (INHALATION) at 21:42

## 2018-01-23 RX ADMIN — LEVALBUTEROL HYDROCHLORIDE 1.25 MG: 1.25 SOLUTION, CONCENTRATE RESPIRATORY (INHALATION) at 08:41

## 2018-01-23 RX ADMIN — ASPIRIN 81 MG 81 MG: 81 TABLET ORAL at 09:13

## 2018-01-23 RX ADMIN — INSULIN LISPRO 2 UNITS: 100 INJECTION, SOLUTION INTRAVENOUS; SUBCUTANEOUS at 21:42

## 2018-01-23 RX ADMIN — ISODIUM CHLORIDE 3 ML: 0.03 SOLUTION RESPIRATORY (INHALATION) at 13:04

## 2018-01-23 RX ADMIN — BUDESONIDE AND FORMOTEROL FUMARATE DIHYDRATE 2 PUFF: 160; 4.5 AEROSOL RESPIRATORY (INHALATION) at 09:00

## 2018-01-23 RX ADMIN — ISODIUM CHLORIDE 3 ML: 0.03 SOLUTION RESPIRATORY (INHALATION) at 08:41

## 2018-01-23 RX ADMIN — ACETAMINOPHEN 975 MG: 325 TABLET ORAL at 15:00

## 2018-01-23 RX ADMIN — LEVALBUTEROL HYDROCHLORIDE 1.25 MG: 1.25 SOLUTION, CONCENTRATE RESPIRATORY (INHALATION) at 13:03

## 2018-01-23 NOTE — PROGRESS NOTES
Progress Note -  Internal Medicine / Hospitalists  Pedor Dale 76 y o  female MRN: [id number]  Unit/Bed#: [place] 2 -01 Encounter: [id number]      ASSESSMENT AND PLAN:  1-Acute hypoxic respiratory failure-requiring endotracheal intubation-successfully extubated on 01/10/2017   multifactorial due to UTI with sepsis, fluid overload and  HHNK    This has resolved, completed 10 d levaquin/cefepine   Tolerating RA for several days   Probable undiagnosed COPD contributing   Continue Symbicort and nebulizers     2-Acute metabolic encephalopathy:improved-secondary to 1 and 5  Improved, mentation is back to baseline      3-Diabetes mellitis presenting with with HHNK-this is now resolved  Accuchecks 124- 212 last 24h  on lantus 12u hs plus SSI     4-NSTEMI type II secondary to HHNK and UTI-stable  Not ACS     5-Sepsis with klebsiella UTI-see #1  Compeleted 10d levaquin     6-Acute renal failure with unknown baseline creatinine-2 2 POA;  now wnl      7- Urinary Retention - failed voiding trial & Rutherford re-inserted  Follow up with Urology on d/c     8-depression-appreciate geriatric input - citalopram 10mg      9-hypokalemia- stable s/p repletion     10-Dysphagia - dental soft/thin liquids     11- constipation - resolved  Maintain bowel regimen & mobilize        VTE Prophylaxis: Heparin     Dispo: d/w CM/Tisha - Awaiting placement/bed availability at Rehab  Hopeful d/c next 24 hours    ______________________________________________________________________    SUBJECTIVE:   Patient seen and examined  Patient is fatigued  Denies any chest pain, Palpitations coughing fevers or chills  Mild chronic abdominal pain but moving her bowels without difficulty per nursing no nausea or vomiting    Tolerating her diet     OBJECTIVE:   Principal Problem:    Encephalopathy  Active Problems:    Hypokalemia    Acute kidney injury ([address] )    Dehydration    Hypoalbuminemia    Risk for falls    Debility    Sepsis ([facility] )    Acute respiratory failure with hypoxia (HCC)    NSTEMI (non-ST elevated myocardial infarction) ([facility] )    Diabetes ([facility] )      Vitals:   HR:  [] 95  Resp:  [18] 18  BP: (119-137)/(58-66) 124/66  SpO2:  [90 %-95 %] 93 %  Temp (24hrs), Av 1 °F (36 2 °C), Min:97 °F (36 1 °C), Max:97 2 °F (36 2 °C)  Current: Temperature: (!) 97 2 °F (36 2 °C)    Intake/Output Summary (Last 24 hours) at 18 1529  Last data filed at 18 0841   Gross per 24 hour   Intake                0 ml   Output             1700 ml   Net            -1700 ml       Physical Exam:     General Appearance:    Alert, cooperative, no distress   Head:    Normocephalic, without obvious abnormality, atraumatic   Eyes:    Conjunctiva/corneas clear, EOM's intact       Neck:   Supple, no adenopathy, no JVD   Back:     Symmetric, no curvature, ROM normal, no CVA tenderness   Lungs:     Coarse but Clear to auscultation bilaterally, no wheezing or rhonchi   Heart:    Regular rate and rhythm, S1 and S2 normal, no murmur, rub   or gallop   Abdomen:     Soft mildly diffusely tender, no acute abdomen, bowel sounds active, rotund  : clear yellow urine   Extremities:   Extremities normal, atraumatic, no cyanosis or edema   Psych:   Flat Affect   Neurologic:   CNII-XII intact  Weak/deconditioned     Lab, Imaging and other studies:      Results from last 7 days  Lab Units 18  0619   PLATELETS Thousands/uL 445*       Results from last 7 days  Lab Units 18  0624   SODIUM mmol/L 140   POTASSIUM mmol/L 3 5   CHLORIDE mmol/L 103   CO2 mmol/L 27   BUN mg/dL 15   CREATININE mg/dL 0 83   CALCIUM mg/dL 7 8*   GLUCOSE RANDOM mg/dL 122         Lab Results   Component Value Date    BLOODCX No Growth After 5 Days  2018    BLOODCX No Growth After 5 Days   2018    BLOODCX Klebsiella pneumoniae (A) 2018    URINECX 50,000-59,000 cfu/ml Klebsiella pneumoniae (A) 2018       Scheduled Meds:    acetaminophen 975 mg Oral Q8H [facility]   aspirin 81 mg Oral Daily budesonide-formoterol 2 puff Inhalation BID   cholecalciferol 1,000 Units Oral Daily   citalopram 10 mg Oral Daily   guaiFENesin 600 mg Oral BID   heparin (porcine) 5,000 Units Subcutaneous Q8H [address]   insulin glargine 12 Units Subcutaneous HS   insulin lispro 1-6 Units Subcutaneous TID AC   insulin lispro 1-6 Units Subcutaneous HS   levalbuterol 1 25 mg Nebulization TID   senna-docusate sodium 1 tablet Oral HS   sodium chloride 3 mL Nebulization TID       Continuous Infusions:       PRN Meds:  melatonin      Counseling / Coordination of Care  Total floor / unit time spent today 30 minutes  minutes  Greater than 50% of total time was spent with the patient and / or family counseling and / or coordination of care         This note has been constructed using a voice recognition system

## 2018-01-23 NOTE — PROCEDURES
Procedures by STORM Wong  at 1/8/2018 12:40 PM      Author:  STORM Wong Service:  Critical Care/ICU Author Type:  Nurse Practitioner    Filed:  1/8/2018 12:45 PM Date of Service:  1/8/2018 12:40 PM Status:  Signed    :  Robbie Espinal (Nurse Practitioner)  Cosigner:  Bradford Stack MD at 1/8/2018 12:53 PM      Procedure Orders:       1  Intubation [[id number]] ordered by STORM Wong at 01/08/18 1240                 Post-procedure Diagnoses:       1  Encephalopathy [G93 40]                 Intubation  Date/Time: 1/7/2018 7:20 PM  Performed by: Ld Salcido by: Lehman Schwab     Patient location:  Bedside  Other Assisting Provider:  No    Consent:     Consent obtained:  Emergent situation  Universal protocol:     Patient identity confirmed:  Arm band and hospital-assigned identification number  Pre-procedure details:     Patient status:  Unresponsive    Mallampati score:  1    Pretreatment medications:  Etomidate    Paralytics:  None    Sedation type (ED):  Moderate (conscious) sedation (See separate ED Procedural Sedation form)  Indications:     Indications for intubation: airway protection    Procedure details:     Preoxygenation:  Bag valve mask    CPR in progress: no      Intubation method:  Oral    Oral intubation technique:  Direct    Tube size (mm):  8 0    Tube type:  Cuffed    Number of attempts:  1    Ventilation between attempts: no      Cricoid pressure: no      Tube visualized through cords: yes    Placement assessment:     Tube secured with:  ETT munoz    Breath sounds:  Equal    Placement verification: CXR verification, equal breath sounds and ETCO2 detector      CXR findings:  ETT in proper place  Post-procedure details:     Patient tolerance of procedure:   Tolerated well, no immediate complications                     Received for:Provider  EPIC   Jan 8 2018 12:53PM [facility] Standard Time

## 2018-01-24 VITALS
WEIGHT: 170.42 LBS | OXYGEN SATURATION: 97 % | BODY MASS INDEX: 27.39 KG/M2 | HEIGHT: 66 IN | DIASTOLIC BLOOD PRESSURE: 56 MMHG | RESPIRATION RATE: 18 BRPM | SYSTOLIC BLOOD PRESSURE: 115 MMHG | TEMPERATURE: 98.7 F | HEART RATE: 95 BPM

## 2018-01-24 PROBLEM — I21.4 NSTEMI (NON-ST ELEVATED MYOCARDIAL INFARCTION) (HCC): Status: RESOLVED | Noted: 2018-01-16 | Resolved: 2018-01-24

## 2018-01-24 PROBLEM — A41.9 SEPSIS (HCC): Status: RESOLVED | Noted: 2018-01-15 | Resolved: 2018-01-24

## 2018-01-24 PROBLEM — E88.09 HYPOALBUMINEMIA: Status: RESOLVED | Noted: 2018-01-15 | Resolved: 2018-01-24

## 2018-01-24 PROBLEM — E87.6 HYPOKALEMIA: Status: RESOLVED | Noted: 2018-01-07 | Resolved: 2018-01-24

## 2018-01-24 PROBLEM — N17.9 ACUTE KIDNEY INJURY (HCC): Status: RESOLVED | Noted: 2018-01-07 | Resolved: 2018-01-24

## 2018-01-24 PROBLEM — J96.01 ACUTE RESPIRATORY FAILURE WITH HYPOXIA (HCC): Status: RESOLVED | Noted: 2018-01-15 | Resolved: 2018-01-24

## 2018-01-24 PROBLEM — E86.0 DEHYDRATION: Status: RESOLVED | Noted: 2018-01-07 | Resolved: 2018-01-24

## 2018-01-24 LAB
GLUCOSE SERPL-MCNC: 113 MG/DL (ref 65–140)
GLUCOSE SERPL-MCNC: 120 MG/DL (ref 65–140)

## 2018-01-24 PROCEDURE — 99239 HOSP IP/OBS DSCHRG MGMT >30: CPT | Performed by: PHYSICIAN ASSISTANT

## 2018-01-24 PROCEDURE — 94760 N-INVAS EAR/PLS OXIMETRY 1: CPT

## 2018-01-24 PROCEDURE — 82948 REAGENT STRIP/BLOOD GLUCOSE: CPT

## 2018-01-24 PROCEDURE — 94640 AIRWAY INHALATION TREATMENT: CPT

## 2018-01-24 RX ORDER — LEVALBUTEROL 1.25 MG/.5ML
1.25 SOLUTION, CONCENTRATE RESPIRATORY (INHALATION) EVERY 8 HOURS PRN
Qty: 1 EACH | Refills: 0 | Status: SHIPPED | OUTPATIENT
Start: 2018-01-24 | End: 2019-01-01

## 2018-01-24 RX ORDER — LANOLIN ALCOHOL/MO/W.PET/CERES
3 CREAM (GRAM) TOPICAL
Qty: 30 TABLET | Refills: 0 | Status: SHIPPED | OUTPATIENT
Start: 2018-01-24 | End: 2019-01-01

## 2018-01-24 RX ORDER — GUAIFENESIN 600 MG
600 TABLET, EXTENDED RELEASE 12 HR ORAL 2 TIMES DAILY
Qty: 60 TABLET | Refills: 0 | Status: SHIPPED | OUTPATIENT
Start: 2018-01-24 | End: 2019-01-01

## 2018-01-24 RX ORDER — INSULIN GLARGINE 100 [IU]/ML
12 INJECTION, SOLUTION SUBCUTANEOUS
Qty: 10 ML | Refills: 0 | Status: SHIPPED | OUTPATIENT
Start: 2018-01-24 | End: 2019-01-01 | Stop reason: HOSPADM

## 2018-01-24 RX ORDER — PANTOPRAZOLE SODIUM 40 MG/1
40 TABLET, DELAYED RELEASE ORAL
Status: DISCONTINUED | OUTPATIENT
Start: 2018-01-25 | End: 2018-01-24 | Stop reason: HOSPADM

## 2018-01-24 RX ORDER — BUDESONIDE AND FORMOTEROL FUMARATE DIHYDRATE 160; 4.5 UG/1; UG/1
2 AEROSOL RESPIRATORY (INHALATION)
Qty: 1 INHALER | Refills: 0 | Status: SHIPPED | OUTPATIENT
Start: 2018-01-24 | End: 2019-01-01

## 2018-01-24 RX ORDER — PANTOPRAZOLE SODIUM 40 MG/1
40 TABLET, DELAYED RELEASE ORAL
Qty: 30 TABLET | Refills: 0 | Status: SHIPPED | OUTPATIENT
Start: 2018-01-25 | End: 2019-01-01

## 2018-01-24 RX ORDER — ACETAMINOPHEN 325 MG/1
650 TABLET ORAL EVERY 6 HOURS PRN
Qty: 30 TABLET | Refills: 0 | Status: SHIPPED | OUTPATIENT
Start: 2018-01-24 | End: 2019-01-01

## 2018-01-24 RX ORDER — CITALOPRAM 10 MG/1
10 TABLET ORAL DAILY
Qty: 30 TABLET | Refills: 0 | Status: SHIPPED | OUTPATIENT
Start: 2018-01-25 | End: 2019-01-01 | Stop reason: HOSPADM

## 2018-01-24 RX ORDER — AMOXICILLIN 250 MG
1 CAPSULE ORAL
Qty: 30 TABLET | Refills: 0 | Status: SHIPPED | OUTPATIENT
Start: 2018-01-24 | End: 2019-01-01

## 2018-01-24 RX ORDER — ASPIRIN 81 MG/1
81 TABLET, CHEWABLE ORAL DAILY
Qty: 30 TABLET | Refills: 0 | Status: SHIPPED | OUTPATIENT
Start: 2018-01-25 | End: 2019-01-01

## 2018-01-24 RX ADMIN — ACETAMINOPHEN 975 MG: 325 TABLET ORAL at 06:38

## 2018-01-24 RX ADMIN — ASPIRIN 81 MG 81 MG: 81 TABLET ORAL at 10:21

## 2018-01-24 RX ADMIN — ISODIUM CHLORIDE 3 ML: 0.03 SOLUTION RESPIRATORY (INHALATION) at 13:11

## 2018-01-24 RX ADMIN — ACETAMINOPHEN 975 MG: 325 TABLET ORAL at 15:12

## 2018-01-24 RX ADMIN — LEVALBUTEROL HYDROCHLORIDE 1.25 MG: 1.25 SOLUTION, CONCENTRATE RESPIRATORY (INHALATION) at 13:10

## 2018-01-24 RX ADMIN — LEVALBUTEROL HYDROCHLORIDE 1.25 MG: 1.25 SOLUTION, CONCENTRATE RESPIRATORY (INHALATION) at 07:21

## 2018-01-24 RX ADMIN — BUDESONIDE AND FORMOTEROL FUMARATE DIHYDRATE 2 PUFF: 160; 4.5 AEROSOL RESPIRATORY (INHALATION) at 10:22

## 2018-01-24 RX ADMIN — CHOLECALCIFEROL TAB 25 MCG (1000 UNIT) 1000 UNITS: 25 TAB at 10:21

## 2018-01-24 RX ADMIN — HEPARIN SODIUM 5000 UNITS: 5000 INJECTION, SOLUTION INTRAVENOUS; SUBCUTANEOUS at 06:39

## 2018-01-24 RX ADMIN — GUAIFENESIN 600 MG: 600 TABLET, EXTENDED RELEASE ORAL at 10:21

## 2018-01-24 RX ADMIN — ISODIUM CHLORIDE 3 ML: 0.03 SOLUTION RESPIRATORY (INHALATION) at 07:21

## 2018-01-24 RX ADMIN — CITALOPRAM HYDROBROMIDE 10 MG: 20 TABLET ORAL at 10:21

## 2018-01-24 RX ADMIN — HEPARIN SODIUM 5000 UNITS: 5000 INJECTION, SOLUTION INTRAVENOUS; SUBCUTANEOUS at 15:12

## 2018-01-24 NOTE — NURSING NOTE
AVS faxed to [facility]; reported called to receiving facility; IV removed; AVS and jacqueline of care given to patient's transfer (Olena) to give to facility; patient belongings sent with her

## 2018-01-24 NOTE — DISCHARGE SUMMARY
Discharge Summary - [facility] Internal Medicine / Hospitalists  Indigo Knee 76 y o  female MRN: [id number]    [facility] Room / Bed: [facility] 2 /South 2 M* Encounter: [id number]      Admitting Provider: Michelle Nava DO  Discharge Provider: Yuridia Chao PA-C  Admission Date: 1/7/2018     Discharge Date: 1/24/18  Primary Care Physician at Discharge: Maria T Jackson [phone]    Primary Discharge Diagnosis  Principal Problem:    Encephalopathy  Active Problems:    Risk for falls    Debility    Diabetes [facility])  Resolved Problems:    Hypokalemia    Acute kidney injury ([facility] )    Dehydration    Hypoalbuminemia    Sepsis ([facility] )    Acute respiratory failure with hypoxia ([facility] )    NSTEMI (non-ST elevated myocardial infarction) [facility])      Other Problems Addressed  Patient Active Problem List    Diagnosis Date Noted    Diabetes ([facility] ) 01/16/2018    Risk for falls 01/15/2018    Debility 01/15/2018    Encephalopathy 01/07/2018       Consulting Providers   Dr Liz Salter - critical care    Diagnostic Procedures Performed  CT Head - no acute intracranial abnormality  CT Ab/Pelvis - Tiny gas bubble in the urinary bladder  Correlate with urinalysis to exclude UTI  Stable appearing bladder diverticulum  Thickened appearance of the distal esophageal wall which might indicate esophagitis versus less likely neoplasm  Further workup with upper endoscopy or esophagram would be helpful  Colonic diverticulosis without diverticulitis  CXR - Right-sided infiltrate which may represent pneumonia    Repeat CXR - Small bibasilar atelectasis/infiltrate  Obs series - non obstructive bowel gas pattern    Treatments   Antibiotics    Procedures   Intubation & Extubation    Other Pertinent Test Results    Results from last 7 days  Lab Units 01/20/18  0619   PLATELETS Thousands/uL 445*       Results from last 7 days  Lab Units 01/21/18  0624   SODIUM mmol/L 140   POTASSIUM mmol/L 3 5   CHLORIDE mmol/L 103 CO2 mmol/L 27   BUN mg/dL 15   CREATININE mg/dL 0 83   CALCIUM mg/dL 7 8*   GLUCOSE RANDOM mg/dL 122         Lab Results   Component Value Date    BLOODCX No Growth After 5 Days  01/09/2018    BLOODCX No Growth After 5 Days  01/09/2018    BLOODCX Klebsiella pneumoniae (A) 01/07/2018    URINECX 50,000-59,000 cfu/ml Klebsiella pneumoniae (A) 01/07/2018         Presenting Problem/History of Present Illness  Encephalopathy    HOSPITAL COURSE:    Mercedez Lopez is a 76 y o  female who was found on the floor on 1/7/18 and confused by a neighbor  Neighbor states that she checks on the patient several times a day and estimates that she was down for approximately 2 hours  EMS was activated and patient was transported to [address] [place] [address] Emergency Department for further evaluation and treatment  She was noted to be septic with a lactic acid of 12 6 with severe dehydration and HHNK as well as acute kidney injury  Patient was admitted to critical care and intubated  She was successfully extubated on 01/10/2017  The patient had a prolonged hospital course, see the full medical record for details  However briefly her discharge diagnosis are as follows:     1-Acute hypoxic respiratory failure-requiring endotracheal intubation-successfully extubated on 01/10/2017   multifactorial due to UTI with sepsis, fluid overload and  HHNK    This has resolved, completed 10 d levaquin/cefepine   Tolerating RA for several days   Probable undiagnosed COPD contributing   Continue Symbicort and nebulizers     2-Acute metabolic encephalopathy:improved-secondary to 1 and 5  Improved, mentation is back to baseline      3-Diabetes mellitis presenting with with HHNK-this is now resolved  Accuchecks 124- 127 last 24h, continue lantus 12u hs      4-NSTEMI type II secondary to HHNK and UTI-stable  Not ACS     5-Sepsis with klebsiella UTI-see #1   Compeleted 10d levaquin     6-Acute renal failure with unknown baseline creatinine-2 2 POA;  now wnl      7- Urinary Retention - failed voiding trial & Rutherford re-inserted  Follow up with Urology on d/c for voiding trial     8-depression-appreciate geriatric input - citalopram 10mg  [facility]     9-hypokalemia- stable s/p repletion     10-Dysphagia - dental soft/thin liquids; probable GERD contributing  PPI  Speech therapy  If no improvement follow up GI     11- constipation - resolved  Maintain bowel regimen & mobilize      PHYSICAL EXAM:  Vitals:   Temp:  [96 7 °F (35 9 °C)-98 7 °F (37 1 °C)] 98 7 °F (37 1 °C)  HR:  [87-95] 95  Resp:  [18] 18  BP: (115-124)/(56-73) 115/56    General Appearance:    Alert, cooperative, no distress   Head:    Normocephalic, without obvious abnormality, atraumatic   Eyes:    Conjunctiva/corneas clear, EOM's intact      Neck:   Supple, no adenopathy, no JVD   Back:     Symmetric, no curvature, ROM normal, no CVA tenderness   Lungs:     Clear to auscultation bilaterally, no wheezing or rhonchi   Heart:    Regular rate and rhythm, S1 and S2 normal, no murmur, rub   or gallop   Abdomen:     Soft, non-tender, bowel sounds active    Extremities:   Extremities normal, atraumatic, no cyanosis or edema   Psych:   Normal Affect   Neurologic:   CNII-XII intact  Normal strength, sensation and reflexes       Throughout       Discharge Disposition: [facility]        Test Results Pending at Discharge  none    Allergies  Allergies not on file    Diet restrictions: dysphagia 3/thin  Activity restrictions: as tolerate  Discharge Condition: good      Outpatient Follow-Up  Mahesh Carson  [address] [phone] Ivana Aguilar,[facility] [place] [address]     Next Steps: Call in 1 month(s)      Instructions: Please call to schedule an appointment for memory testing and comprehensive geriatric assessment  Edwin Mason MD   Urology [phone] [facility] Main     Next Steps:  Follow up Instructions: follow up within 1-2 weeks for voiding trial      Urvashi Mariscal MD  PCP - General Internal Medicine [phone] [facility] Internal [facility] [address]     Next Steps: Follow up in 1 week(s)      Instructions: follow up in 1 week                      Code Status: Level 3 - DNAR and DNI    Discharge Statement   I spent 33 minutes discharging the patient  This time was spent on the day of discharge  Greater than 50% of total time was spent with the patient and / or family counseling and / or coordination of care  Discharge Medications:  See after visit summary for reconciled discharge medications provided to patient and family        This note has been constructed using a voice recognition system

## 2018-01-24 NOTE — DISCHARGE INSTRUCTIONS
Onur Villagomez is a 76 y o  female who was found on the floor on 1/7/18 and confused by a neighbor  Neighbor states that she checks on the patient several times a day and estimates that she was down for approximately 2 hours  EMS was activated and patient was transported [place] [facility] [place] further evaluation and treatment  She was noted to be septic with a lactic acid of 12 6 with severe dehydration and HHNK as well as acute kidney injury  Patient was admitted to critical care and intubated  She was successfully extubated on 01/10/2017  The patient had a prolonged hospital course, see the full medical record for details  However briefly her discharge diagnosis are as follows:     1-Acute hypoxic respiratory failure-requiring endotracheal intubation-successfully extubated on 01/10/2017   multifactorial due to UTI with sepsis, fluid overload and  HHNK    This has resolved, completed 10 d levaquin/cefepine   Tolerating RA for several days   Probable undiagnosed COPD contributing   Continue Symbicort and nebulizers     2-Acute metabolic encephalopathy:improved-secondary to 1 and 5  Improved, mentation is back to baseline      3-Diabetes mellitis presenting with with HHNK-this is now resolved  Accuchecks 124- 127 last 24h, continue lantus 12u hs      4-NSTEMI type II secondary to HHNK and UTI-stable  Not ACS     5-Sepsis with klebsiella UTI-see #1  Compeleted 10d levaquin     6-Acute renal failure with unknown baseline creatinine-2 2 POA;  now wnl      7- Urinary Retention - failed voiding trial & Rutherford re-inserted  Follow up with Urology on d/c for voiding trial     8-depression-appreciate geriatric input - citalopram 10mg  Follow up center [facility]     9-hypokalemia- stable s/p repletion     10-Dysphagia - dental soft/thin liquids; probable GERD contributing  PPI  Speech therapy  If no improvement follow up GI     11- constipation - resolved   Maintain bowel regimen & mobilize

## 2018-01-24 NOTE — SOCIAL WORK
Authorization received by DERIK MCFARLAND called sister and friend, Antonina  Sister does not drive, Antonina will  patient at 4:00  Informed: PA, RN, unit clerk, and patient was informed when IMM was presented and explained to patient

## 2018-01-25 ENCOUNTER — TELEPHONE (OUTPATIENT)
Dept: UROLOGY | Facility: AMBULATORY SURGERY CENTER | Age: 78
End: 2018-01-25

## 2018-01-30 NOTE — CASE MANAGEMENT
Notification of Discharge  This is a Notification of Discharge from our facility [address]  Please be advised that this patient has been discharge from our facility  Below you will find the admission and discharge date and time including the patients disposition  PRESENTATION DATE: 1/7/2018  9:08 AM  IP ADMISSION DATE: 1/7/18 1215  DISCHARGE DATE: 1/24/2018  4:00 PM  DISPOSITION: Released to SNF/TCU/SNU [address] [place] [address] in the [facility] by Vel Dempsey for 2017  Network Utilization Review Department  Phone: [phone]; Fax [phone]  ATTENTION: The Network Utilization Review Department is now centralized for our 7 Facilities  Make a note that we have a new phone and fax numbers for our Department  Please call with any questions or concerns to [phone] and carefully follow the prompts so that you are directed to the right person  All voicemails are confidential  Fax any determinations, approvals, denials, and requests for initial or continue stay review clinical to [phone]  Due to HIGH CALL volume, it would be easier if you could please send faxed requests to expedite your requests and in part, help us provide discharge notifications faster

## 2018-01-30 NOTE — TELEPHONE ENCOUNTER
Spoke to [place] at [facility] [place]  Appt set for 2/2  Pt  Has failed multiple voiding trials  Rutherford in place

## 2018-02-02 ENCOUNTER — OFFICE VISIT (OUTPATIENT)
Dept: UROLOGY | Facility: MEDICAL CENTER | Age: 78
End: 2018-02-02
Payer: COMMERCIAL

## 2018-02-02 VITALS
WEIGHT: 170 LBS | SYSTOLIC BLOOD PRESSURE: 118 MMHG | DIASTOLIC BLOOD PRESSURE: 60 MMHG | BODY MASS INDEX: 33.38 KG/M2 | HEIGHT: 60 IN

## 2018-02-02 DIAGNOSIS — R33.9 URINARY RETENTION: Primary | ICD-10-CM

## 2018-02-02 PROCEDURE — 99203 OFFICE O/P NEW LOW 30 MIN: CPT | Performed by: UROLOGY

## 2018-02-02 NOTE — PROGRESS NOTES
Assessment/Plan:   No prior voiding history, we have removed the Rutherford catheter today  If the patient should have any troubles voiding while at the rehab facility that can replace Rutherford and give him one more week  Follow-up only if any issues with voiding, otherwise just as needed  There are no diagnoses linked to this encounter  Subjective:     Patient ID: Vianey Nolasco is a 76 y o  female  HPI   Recently discharged from [address] [address] after a long hospitalization for sepsis, possibly urinary source  Patient denies any prior urinary troubles whatsoever, no voiding issues, infections, incontinence  Review of Systems   Constitutional: Negative  Respiratory: Negative  Gastrointestinal: Negative  Genitourinary: Negative  Objective:     Physical Exam   Constitutional: She appears well-developed  Abdominal: Soft       hospital records reviewed in detail

## 2019-01-01 ENCOUNTER — APPOINTMENT (EMERGENCY)
Dept: RADIOLOGY | Facility: HOSPITAL | Age: 79
DRG: 283 | End: 2019-01-01
Payer: COMMERCIAL

## 2019-01-01 ENCOUNTER — HOSPITAL ENCOUNTER (INPATIENT)
Facility: HOSPITAL | Age: 79
LOS: 10 days | DRG: 283 | End: 2019-02-27
Attending: EMERGENCY MEDICINE | Admitting: INTERNAL MEDICINE
Payer: COMMERCIAL

## 2019-01-01 ENCOUNTER — APPOINTMENT (INPATIENT)
Dept: NON INVASIVE DIAGNOSTICS | Facility: HOSPITAL | Age: 79
DRG: 283 | End: 2019-01-01
Attending: INTERNAL MEDICINE
Payer: COMMERCIAL

## 2019-01-01 ENCOUNTER — APPOINTMENT (INPATIENT)
Dept: NON INVASIVE DIAGNOSTICS | Facility: HOSPITAL | Age: 79
DRG: 283 | End: 2019-01-01
Payer: COMMERCIAL

## 2019-01-01 VITALS
BODY MASS INDEX: 32.64 KG/M2 | HEIGHT: 60 IN | DIASTOLIC BLOOD PRESSURE: 60 MMHG | TEMPERATURE: 97.3 F | RESPIRATION RATE: 18 BRPM | HEART RATE: 62 BPM | WEIGHT: 166.23 LBS | OXYGEN SATURATION: 91 % | SYSTOLIC BLOOD PRESSURE: 95 MMHG

## 2019-01-01 DIAGNOSIS — R77.8 ELEVATED TROPONIN: ICD-10-CM

## 2019-01-01 DIAGNOSIS — R11.2 NAUSEA & VOMITING: ICD-10-CM

## 2019-01-01 DIAGNOSIS — I21.3 STEMI (ST ELEVATION MYOCARDIAL INFARCTION) (HCC): Primary | ICD-10-CM

## 2019-01-01 LAB
ALBUMIN SERPL BCP-MCNC: 2.4 G/DL (ref 3.5–5)
ALP SERPL-CCNC: 72 U/L (ref 46–116)
ALT SERPL W P-5'-P-CCNC: 32 U/L (ref 12–78)
ANION GAP BLD CALC-SCNC: 18 MMOL/L (ref 4–13)
ANION GAP SERPL CALCULATED.3IONS-SCNC: 10 MMOL/L (ref 4–13)
ANION GAP SERPL CALCULATED.3IONS-SCNC: 10 MMOL/L (ref 4–13)
ANION GAP SERPL CALCULATED.3IONS-SCNC: 11 MMOL/L (ref 4–13)
ANION GAP SERPL CALCULATED.3IONS-SCNC: 9 MMOL/L (ref 4–13)
APTT PPP: 119 SECONDS (ref 26–38)
APTT PPP: 39 SECONDS (ref 26–38)
APTT PPP: 56 SECONDS (ref 26–38)
APTT PPP: 57 SECONDS (ref 26–38)
APTT PPP: 58 SECONDS (ref 26–38)
APTT PPP: 59 SECONDS (ref 26–38)
APTT PPP: 67 SECONDS (ref 26–38)
APTT PPP: 68 SECONDS (ref 26–38)
APTT PPP: 72 SECONDS (ref 26–38)
APTT PPP: 74 SECONDS (ref 26–38)
AST SERPL W P-5'-P-CCNC: 148 U/L (ref 5–45)
ATRIAL RATE: 84 BPM
ATRIAL RATE: 90 BPM
ATRIAL RATE: 94 BPM
BACTERIA UR CULT: NORMAL
BACTERIA UR QL AUTO: ABNORMAL /HPF
BASOPHILS # BLD AUTO: 0.05 THOUSANDS/ΜL (ref 0–0.1)
BASOPHILS # BLD AUTO: 0.06 THOUSANDS/ΜL (ref 0–0.1)
BASOPHILS NFR BLD AUTO: 0 % (ref 0–1)
BASOPHILS NFR BLD AUTO: 0 % (ref 0–1)
BILIRUB SERPL-MCNC: 1.11 MG/DL (ref 0.2–1)
BILIRUB UR QL STRIP: NEGATIVE
BUN BLD-MCNC: 50 MG/DL (ref 5–25)
BUN SERPL-MCNC: 25 MG/DL (ref 5–25)
BUN SERPL-MCNC: 32 MG/DL (ref 5–25)
BUN SERPL-MCNC: 50 MG/DL (ref 5–25)
BUN SERPL-MCNC: 58 MG/DL (ref 5–25)
CA-I BLD-SCNC: 0.97 MMOL/L (ref 1.12–1.32)
CALCIUM SERPL-MCNC: 7.9 MG/DL (ref 8.3–10.1)
CALCIUM SERPL-MCNC: 8.2 MG/DL (ref 8.3–10.1)
CALCIUM SERPL-MCNC: 8.4 MG/DL (ref 8.3–10.1)
CALCIUM SERPL-MCNC: 8.6 MG/DL (ref 8.3–10.1)
CHLORIDE BLD-SCNC: 93 MMOL/L (ref 100–108)
CHLORIDE SERPL-SCNC: 101 MMOL/L (ref 100–108)
CHLORIDE SERPL-SCNC: 102 MMOL/L (ref 100–108)
CHLORIDE SERPL-SCNC: 105 MMOL/L (ref 100–108)
CHLORIDE SERPL-SCNC: 95 MMOL/L (ref 100–108)
CHOLEST SERPL-MCNC: 134 MG/DL (ref 50–200)
CLARITY UR: ABNORMAL
CO2 SERPL-SCNC: 24 MMOL/L (ref 21–32)
CO2 SERPL-SCNC: 24 MMOL/L (ref 21–32)
CO2 SERPL-SCNC: 26 MMOL/L (ref 21–32)
CO2 SERPL-SCNC: 29 MMOL/L (ref 21–32)
COLOR UR: YELLOW
CREAT BLD-MCNC: 0.9 MG/DL (ref 0.6–1.3)
CREAT SERPL-MCNC: 0.65 MG/DL (ref 0.6–1.3)
CREAT SERPL-MCNC: 0.68 MG/DL (ref 0.6–1.3)
CREAT SERPL-MCNC: 0.8 MG/DL (ref 0.6–1.3)
CREAT SERPL-MCNC: 0.93 MG/DL (ref 0.6–1.3)
EOSINOPHIL # BLD AUTO: 0.09 THOUSAND/ΜL (ref 0–0.61)
EOSINOPHIL # BLD AUTO: 0.2 THOUSAND/ΜL (ref 0–0.61)
EOSINOPHIL NFR BLD AUTO: 1 % (ref 0–6)
EOSINOPHIL NFR BLD AUTO: 2 % (ref 0–6)
ERYTHROCYTE [DISTWIDTH] IN BLOOD BY AUTOMATED COUNT: 12.8 % (ref 11.6–15.1)
ERYTHROCYTE [DISTWIDTH] IN BLOOD BY AUTOMATED COUNT: 13.1 % (ref 11.6–15.1)
ERYTHROCYTE [DISTWIDTH] IN BLOOD BY AUTOMATED COUNT: 13.1 % (ref 11.6–15.1)
ERYTHROCYTE [DISTWIDTH] IN BLOOD BY AUTOMATED COUNT: 13.2 % (ref 11.6–15.1)
ERYTHROCYTE [DISTWIDTH] IN BLOOD BY AUTOMATED COUNT: 13.3 % (ref 11.6–15.1)
FLUAV AG SPEC QL: NOT DETECTED
FLUBV AG SPEC QL: NOT DETECTED
GFR SERPL CREATININE-BSD FRML MDRD: 58 ML/MIN/1.73SQ M
GFR SERPL CREATININE-BSD FRML MDRD: 61 ML/MIN/1.73SQ M
GFR SERPL CREATININE-BSD FRML MDRD: 70 ML/MIN/1.73SQ M
GFR SERPL CREATININE-BSD FRML MDRD: 83 ML/MIN/1.73SQ M
GFR SERPL CREATININE-BSD FRML MDRD: 84 ML/MIN/1.73SQ M
GLUCOSE SERPL-MCNC: 110 MG/DL (ref 65–140)
GLUCOSE SERPL-MCNC: 116 MG/DL (ref 65–140)
GLUCOSE SERPL-MCNC: 122 MG/DL (ref 65–140)
GLUCOSE SERPL-MCNC: 124 MG/DL (ref 65–140)
GLUCOSE SERPL-MCNC: 126 MG/DL (ref 65–140)
GLUCOSE SERPL-MCNC: 126 MG/DL (ref 65–140)
GLUCOSE SERPL-MCNC: 127 MG/DL (ref 65–140)
GLUCOSE SERPL-MCNC: 127 MG/DL (ref 65–140)
GLUCOSE SERPL-MCNC: 129 MG/DL (ref 65–140)
GLUCOSE SERPL-MCNC: 140 MG/DL (ref 65–140)
GLUCOSE SERPL-MCNC: 141 MG/DL (ref 65–140)
GLUCOSE SERPL-MCNC: 147 MG/DL (ref 65–140)
GLUCOSE SERPL-MCNC: 150 MG/DL (ref 65–140)
GLUCOSE SERPL-MCNC: 151 MG/DL (ref 65–140)
GLUCOSE SERPL-MCNC: 153 MG/DL (ref 65–140)
GLUCOSE SERPL-MCNC: 153 MG/DL (ref 65–140)
GLUCOSE SERPL-MCNC: 155 MG/DL (ref 65–140)
GLUCOSE SERPL-MCNC: 156 MG/DL (ref 65–140)
GLUCOSE SERPL-MCNC: 157 MG/DL (ref 65–140)
GLUCOSE SERPL-MCNC: 157 MG/DL (ref 65–140)
GLUCOSE SERPL-MCNC: 158 MG/DL (ref 65–140)
GLUCOSE SERPL-MCNC: 158 MG/DL (ref 65–140)
GLUCOSE SERPL-MCNC: 170 MG/DL (ref 65–140)
GLUCOSE SERPL-MCNC: 179 MG/DL (ref 65–140)
GLUCOSE SERPL-MCNC: 180 MG/DL (ref 65–140)
GLUCOSE SERPL-MCNC: 184 MG/DL (ref 65–140)
GLUCOSE SERPL-MCNC: 188 MG/DL (ref 65–140)
GLUCOSE SERPL-MCNC: 189 MG/DL (ref 65–140)
GLUCOSE SERPL-MCNC: 193 MG/DL (ref 65–140)
GLUCOSE SERPL-MCNC: 201 MG/DL (ref 65–140)
GLUCOSE SERPL-MCNC: 209 MG/DL (ref 65–140)
GLUCOSE SERPL-MCNC: 216 MG/DL (ref 65–140)
GLUCOSE SERPL-MCNC: 229 MG/DL (ref 65–140)
GLUCOSE SERPL-MCNC: 238 MG/DL (ref 65–140)
GLUCOSE SERPL-MCNC: 248 MG/DL (ref 65–140)
GLUCOSE SERPL-MCNC: 322 MG/DL (ref 65–140)
GLUCOSE SERPL-MCNC: 93 MG/DL (ref 65–140)
GLUCOSE SERPL-MCNC: 93 MG/DL (ref 65–140)
GLUCOSE SERPL-MCNC: 97 MG/DL (ref 65–140)
GLUCOSE SERPL-MCNC: 98 MG/DL (ref 65–140)
GLUCOSE UR STRIP-MCNC: NEGATIVE MG/DL
HCT VFR BLD AUTO: 34.1 % (ref 34.8–46.1)
HCT VFR BLD AUTO: 34.9 % (ref 34.8–46.1)
HCT VFR BLD AUTO: 35.9 % (ref 34.8–46.1)
HCT VFR BLD AUTO: 37.4 % (ref 34.8–46.1)
HCT VFR BLD AUTO: 38.6 % (ref 34.8–46.1)
HCT VFR BLD CALC: 38 % (ref 34.8–46.1)
HDLC SERPL-MCNC: 31 MG/DL (ref 40–60)
HGB BLD-MCNC: 11 G/DL (ref 11.5–15.4)
HGB BLD-MCNC: 11.1 G/DL (ref 11.5–15.4)
HGB BLD-MCNC: 11.5 G/DL (ref 11.5–15.4)
HGB BLD-MCNC: 11.7 G/DL (ref 11.5–15.4)
HGB BLD-MCNC: 12.7 G/DL (ref 11.5–15.4)
HGB BLDA-MCNC: 12.9 G/DL (ref 11.5–15.4)
HGB UR QL STRIP.AUTO: ABNORMAL
IMM GRANULOCYTES # BLD AUTO: 0.1 THOUSAND/UL (ref 0–0.2)
IMM GRANULOCYTES # BLD AUTO: 0.11 THOUSAND/UL (ref 0–0.2)
IMM GRANULOCYTES NFR BLD AUTO: 1 % (ref 0–2)
IMM GRANULOCYTES NFR BLD AUTO: 1 % (ref 0–2)
INR PPP: 1.17 (ref 0.86–1.17)
KETONES UR STRIP-MCNC: NEGATIVE MG/DL
LDLC SERPL CALC-MCNC: 85 MG/DL (ref 0–100)
LEUKOCYTE ESTERASE UR QL STRIP: ABNORMAL
LIPASE SERPL-CCNC: 271 U/L (ref 73–393)
LYMPHOCYTES # BLD AUTO: 1.77 THOUSANDS/ΜL (ref 0.6–4.47)
LYMPHOCYTES # BLD AUTO: 1.78 THOUSANDS/ΜL (ref 0.6–4.47)
LYMPHOCYTES NFR BLD AUTO: 10 % (ref 14–44)
LYMPHOCYTES NFR BLD AUTO: 15 % (ref 14–44)
MAGNESIUM SERPL-MCNC: 1.7 MG/DL (ref 1.6–2.6)
MCH RBC QN AUTO: 28.2 PG (ref 26.8–34.3)
MCH RBC QN AUTO: 28.6 PG (ref 26.8–34.3)
MCH RBC QN AUTO: 28.7 PG (ref 26.8–34.3)
MCH RBC QN AUTO: 29.3 PG (ref 26.8–34.3)
MCH RBC QN AUTO: 29.4 PG (ref 26.8–34.3)
MCHC RBC AUTO-ENTMCNC: 30.9 G/DL (ref 31.4–37.4)
MCHC RBC AUTO-ENTMCNC: 31.3 G/DL (ref 31.4–37.4)
MCHC RBC AUTO-ENTMCNC: 32.3 G/DL (ref 31.4–37.4)
MCHC RBC AUTO-ENTMCNC: 32.9 G/DL (ref 31.4–37.4)
MCHC RBC AUTO-ENTMCNC: 33 G/DL (ref 31.4–37.4)
MCV RBC AUTO: 89 FL (ref 82–98)
MCV RBC AUTO: 91 FL (ref 82–98)
MCV RBC AUTO: 92 FL (ref 82–98)
MONOCYTES # BLD AUTO: 1.2 THOUSAND/ΜL (ref 0.17–1.22)
MONOCYTES # BLD AUTO: 1.23 THOUSAND/ΜL (ref 0.17–1.22)
MONOCYTES NFR BLD AUTO: 10 % (ref 4–12)
MONOCYTES NFR BLD AUTO: 7 % (ref 4–12)
NEUTROPHILS # BLD AUTO: 15.29 THOUSANDS/ΜL (ref 1.85–7.62)
NEUTROPHILS # BLD AUTO: 8.61 THOUSANDS/ΜL (ref 1.85–7.62)
NEUTS SEG NFR BLD AUTO: 72 % (ref 43–75)
NEUTS SEG NFR BLD AUTO: 81 % (ref 43–75)
NITRITE UR QL STRIP: NEGATIVE
NON-SQ EPI CELLS URNS QL MICRO: ABNORMAL /HPF
NONHDLC SERPL-MCNC: 103 MG/DL
NRBC BLD AUTO-RTO: 0 /100 WBCS
NRBC BLD AUTO-RTO: 0 /100 WBCS
NT-PROBNP SERPL-MCNC: 6562 PG/ML
P AXIS: 54 DEGREES
P AXIS: 59 DEGREES
P AXIS: 61 DEGREES
PCO2 BLD: 27 MMOL/L (ref 21–32)
PH UR STRIP.AUTO: 8.5 [PH] (ref 4.5–8)
PLATELET # BLD AUTO: 316 THOUSANDS/UL (ref 149–390)
PLATELET # BLD AUTO: 344 THOUSANDS/UL (ref 149–390)
PLATELET # BLD AUTO: 349 THOUSANDS/UL (ref 149–390)
PLATELET # BLD AUTO: 363 THOUSANDS/UL (ref 149–390)
PLATELET # BLD AUTO: 442 THOUSANDS/UL (ref 149–390)
PMV BLD AUTO: 10.5 FL (ref 8.9–12.7)
PMV BLD AUTO: 11 FL (ref 8.9–12.7)
PMV BLD AUTO: 11.3 FL (ref 8.9–12.7)
PMV BLD AUTO: 11.3 FL (ref 8.9–12.7)
PMV BLD AUTO: 11.4 FL (ref 8.9–12.7)
POTASSIUM BLD-SCNC: 3.1 MMOL/L (ref 3.5–5.3)
POTASSIUM SERPL-SCNC: 2.9 MMOL/L (ref 3.5–5.3)
POTASSIUM SERPL-SCNC: 3.5 MMOL/L (ref 3.5–5.3)
POTASSIUM SERPL-SCNC: 3.7 MMOL/L (ref 3.5–5.3)
POTASSIUM SERPL-SCNC: 3.9 MMOL/L (ref 3.5–5.3)
PR INTERVAL: 134 MS
PR INTERVAL: 136 MS
PR INTERVAL: 138 MS
PROT SERPL-MCNC: 7.1 G/DL (ref 6.4–8.2)
PROT UR STRIP-MCNC: ABNORMAL MG/DL
PROTHROMBIN TIME: 15 SECONDS (ref 11.8–14.2)
QRS AXIS: 103 DEGREES
QRS AXIS: 117 DEGREES
QRS AXIS: 127 DEGREES
QRSD INTERVAL: 138 MS
QRSD INTERVAL: 140 MS
QRSD INTERVAL: 96 MS
QT INTERVAL: 380 MS
QT INTERVAL: 388 MS
QT INTERVAL: 410 MS
QTC INTERVAL: 449 MS
QTC INTERVAL: 474 MS
QTC INTERVAL: 512 MS
RBC # BLD AUTO: 3.85 MILLION/UL (ref 3.81–5.12)
RBC # BLD AUTO: 3.91 MILLION/UL (ref 3.81–5.12)
RBC # BLD AUTO: 3.93 MILLION/UL (ref 3.81–5.12)
RBC # BLD AUTO: 4.07 MILLION/UL (ref 3.81–5.12)
RBC # BLD AUTO: 4.33 MILLION/UL (ref 3.81–5.12)
RBC #/AREA URNS AUTO: ABNORMAL /HPF
RSV B RNA SPEC QL NAA+PROBE: NOT DETECTED
SODIUM BLD-SCNC: 134 MMOL/L (ref 136–145)
SODIUM SERPL-SCNC: 134 MMOL/L (ref 136–145)
SODIUM SERPL-SCNC: 135 MMOL/L (ref 136–145)
SODIUM SERPL-SCNC: 138 MMOL/L (ref 136–145)
SODIUM SERPL-SCNC: 139 MMOL/L (ref 136–145)
SP GR UR STRIP.AUTO: 1.01 (ref 1–1.03)
SPECIMEN SOURCE: ABNORMAL
T WAVE AXIS: 28 DEGREES
T WAVE AXIS: 29 DEGREES
T WAVE AXIS: 38 DEGREES
TRIGL SERPL-MCNC: 91 MG/DL
TROPONIN I SERPL-MCNC: 34.82 NG/ML
TROPONIN I SERPL-MCNC: 38.91 NG/ML
TROPONIN I SERPL-MCNC: 39.2 NG/ML
UROBILINOGEN UR QL STRIP.AUTO: 2 E.U./DL
VENTRICULAR RATE: 84 BPM
VENTRICULAR RATE: 90 BPM
VENTRICULAR RATE: 94 BPM
WBC # BLD AUTO: 11.96 THOUSAND/UL (ref 4.31–10.16)
WBC # BLD AUTO: 12.42 THOUSAND/UL (ref 4.31–10.16)
WBC # BLD AUTO: 12.93 THOUSAND/UL (ref 4.31–10.16)
WBC # BLD AUTO: 13.34 THOUSAND/UL (ref 4.31–10.16)
WBC # BLD AUTO: 18.53 THOUSAND/UL (ref 4.31–10.16)
WBC #/AREA URNS AUTO: ABNORMAL /HPF

## 2019-01-01 PROCEDURE — 82948 REAGENT STRIP/BLOOD GLUCOSE: CPT

## 2019-01-01 PROCEDURE — 85610 PROTHROMBIN TIME: CPT | Performed by: EMERGENCY MEDICINE

## 2019-01-01 PROCEDURE — 83880 ASSAY OF NATRIURETIC PEPTIDE: CPT | Performed by: EMERGENCY MEDICINE

## 2019-01-01 PROCEDURE — 84484 ASSAY OF TROPONIN QUANT: CPT | Performed by: HOSPITALIST

## 2019-01-01 PROCEDURE — 71046 X-RAY EXAM CHEST 2 VIEWS: CPT

## 2019-01-01 PROCEDURE — 85730 THROMBOPLASTIN TIME PARTIAL: CPT | Performed by: EMERGENCY MEDICINE

## 2019-01-01 PROCEDURE — 83735 ASSAY OF MAGNESIUM: CPT | Performed by: HOSPITALIST

## 2019-01-01 PROCEDURE — 99232 SBSQ HOSP IP/OBS MODERATE 35: CPT | Performed by: NURSE PRACTITIONER

## 2019-01-01 PROCEDURE — 85014 HEMATOCRIT: CPT

## 2019-01-01 PROCEDURE — 80048 BASIC METABOLIC PNL TOTAL CA: CPT | Performed by: NURSE PRACTITIONER

## 2019-01-01 PROCEDURE — 85730 THROMBOPLASTIN TIME PARTIAL: CPT | Performed by: HOSPITALIST

## 2019-01-01 PROCEDURE — 36415 COLL VENOUS BLD VENIPUNCTURE: CPT

## 2019-01-01 PROCEDURE — 99232 SBSQ HOSP IP/OBS MODERATE 35: CPT | Performed by: INTERNAL MEDICINE

## 2019-01-01 PROCEDURE — 97535 SELF CARE MNGMENT TRAINING: CPT

## 2019-01-01 PROCEDURE — 99222 1ST HOSP IP/OBS MODERATE 55: CPT | Performed by: INTERNAL MEDICINE

## 2019-01-01 PROCEDURE — 80048 BASIC METABOLIC PNL TOTAL CA: CPT | Performed by: INTERNAL MEDICINE

## 2019-01-01 PROCEDURE — 87086 URINE CULTURE/COLONY COUNT: CPT | Performed by: NURSE PRACTITIONER

## 2019-01-01 PROCEDURE — 93005 ELECTROCARDIOGRAM TRACING: CPT

## 2019-01-01 PROCEDURE — 90662 IIV NO PRSV INCREASED AG IM: CPT | Performed by: HOSPITALIST

## 2019-01-01 PROCEDURE — 93308 TTE F-UP OR LMTD: CPT | Performed by: INTERNAL MEDICINE

## 2019-01-01 PROCEDURE — 83690 ASSAY OF LIPASE: CPT | Performed by: EMERGENCY MEDICINE

## 2019-01-01 PROCEDURE — 85730 THROMBOPLASTIN TIME PARTIAL: CPT | Performed by: INTERNAL MEDICINE

## 2019-01-01 PROCEDURE — 93321 DOPPLER ECHO F-UP/LMTD STD: CPT | Performed by: INTERNAL MEDICINE

## 2019-01-01 PROCEDURE — 80047 BASIC METABLC PNL IONIZED CA: CPT

## 2019-01-01 PROCEDURE — 99285 EMERGENCY DEPT VISIT HI MDM: CPT

## 2019-01-01 PROCEDURE — 80048 BASIC METABOLIC PNL TOTAL CA: CPT | Performed by: HOSPITALIST

## 2019-01-01 PROCEDURE — 87631 RESP VIRUS 3-5 TARGETS: CPT | Performed by: EMERGENCY MEDICINE

## 2019-01-01 PROCEDURE — 85025 COMPLETE CBC W/AUTO DIFF WBC: CPT | Performed by: INTERNAL MEDICINE

## 2019-01-01 PROCEDURE — 97116 GAIT TRAINING THERAPY: CPT

## 2019-01-01 PROCEDURE — G8988 SELF CARE GOAL STATUS: HCPCS

## 2019-01-01 PROCEDURE — 99231 SBSQ HOSP IP/OBS SF/LOW 25: CPT | Performed by: INTERNAL MEDICINE

## 2019-01-01 PROCEDURE — 84484 ASSAY OF TROPONIN QUANT: CPT | Performed by: EMERGENCY MEDICINE

## 2019-01-01 PROCEDURE — 85025 COMPLETE CBC W/AUTO DIFF WBC: CPT | Performed by: EMERGENCY MEDICINE

## 2019-01-01 PROCEDURE — 93010 ELECTROCARDIOGRAM REPORT: CPT | Performed by: INTERNAL MEDICINE

## 2019-01-01 PROCEDURE — 97530 THERAPEUTIC ACTIVITIES: CPT

## 2019-01-01 PROCEDURE — 99223 1ST HOSP IP/OBS HIGH 75: CPT | Performed by: HOSPITALIST

## 2019-01-01 PROCEDURE — 97110 THERAPEUTIC EXERCISES: CPT

## 2019-01-01 PROCEDURE — 99233 SBSQ HOSP IP/OBS HIGH 50: CPT | Performed by: INTERNAL MEDICINE

## 2019-01-01 PROCEDURE — 97166 OT EVAL MOD COMPLEX 45 MIN: CPT

## 2019-01-01 PROCEDURE — 85027 COMPLETE CBC AUTOMATED: CPT | Performed by: INTERNAL MEDICINE

## 2019-01-01 PROCEDURE — G8987 SELF CARE CURRENT STATUS: HCPCS

## 2019-01-01 PROCEDURE — 80061 LIPID PANEL: CPT | Performed by: HOSPITALIST

## 2019-01-01 PROCEDURE — 93325 DOPPLER ECHO COLOR FLOW MAPG: CPT | Performed by: INTERNAL MEDICINE

## 2019-01-01 PROCEDURE — 96360 HYDRATION IV INFUSION INIT: CPT

## 2019-01-01 PROCEDURE — G8978 MOBILITY CURRENT STATUS: HCPCS

## 2019-01-01 PROCEDURE — 81001 URINALYSIS AUTO W/SCOPE: CPT | Performed by: INTERNAL MEDICINE

## 2019-01-01 PROCEDURE — 93308 TTE F-UP OR LMTD: CPT

## 2019-01-01 PROCEDURE — G8979 MOBILITY GOAL STATUS: HCPCS

## 2019-01-01 PROCEDURE — G0008 ADMIN INFLUENZA VIRUS VAC: HCPCS | Performed by: HOSPITALIST

## 2019-01-01 PROCEDURE — 97163 PT EVAL HIGH COMPLEX 45 MIN: CPT

## 2019-01-01 PROCEDURE — 85027 COMPLETE CBC AUTOMATED: CPT | Performed by: HOSPITALIST

## 2019-01-01 PROCEDURE — 80053 COMPREHEN METABOLIC PANEL: CPT | Performed by: EMERGENCY MEDICINE

## 2019-01-01 RX ORDER — ATORVASTATIN CALCIUM 10 MG/1
20 TABLET, FILM COATED ORAL EVERY EVENING
Status: DISCONTINUED | OUTPATIENT
Start: 2019-01-01 | End: 2019-01-01 | Stop reason: HOSPADM

## 2019-01-01 RX ORDER — INSULIN GLARGINE 100 [IU]/ML
15 INJECTION, SOLUTION SUBCUTANEOUS
Status: DISCONTINUED | OUTPATIENT
Start: 2019-01-01 | End: 2019-01-01 | Stop reason: HOSPADM

## 2019-01-01 RX ORDER — INSULIN GLARGINE 100 [IU]/ML
12 INJECTION, SOLUTION SUBCUTANEOUS
Status: DISCONTINUED | OUTPATIENT
Start: 2019-01-01 | End: 2019-01-01

## 2019-01-01 RX ORDER — ASPIRIN 81 MG/1
324 TABLET, CHEWABLE ORAL DAILY
Status: DISCONTINUED | OUTPATIENT
Start: 2019-01-01 | End: 2019-01-01

## 2019-01-01 RX ORDER — LISINOPRIL 2.5 MG/1
2.5 TABLET ORAL DAILY
Status: DISCONTINUED | OUTPATIENT
Start: 2019-01-01 | End: 2019-01-01 | Stop reason: HOSPADM

## 2019-01-01 RX ORDER — HALOPERIDOL 5 MG/ML
1 INJECTION INTRAMUSCULAR EVERY 6 HOURS PRN
Status: DISCONTINUED | OUTPATIENT
Start: 2019-01-01 | End: 2019-01-01

## 2019-01-01 RX ORDER — ASPIRIN 81 MG/1
81 TABLET, CHEWABLE ORAL DAILY
Status: DISCONTINUED | OUTPATIENT
Start: 2019-01-01 | End: 2019-01-01 | Stop reason: HOSPADM

## 2019-01-01 RX ORDER — CITALOPRAM 20 MG/1
10 TABLET ORAL DAILY
Status: DISCONTINUED | OUTPATIENT
Start: 2019-01-01 | End: 2019-01-01 | Stop reason: HOSPADM

## 2019-01-01 RX ORDER — HEPARIN SODIUM 1000 [USP'U]/ML
2000 INJECTION, SOLUTION INTRAVENOUS; SUBCUTANEOUS AS NEEDED
Status: DISCONTINUED | OUTPATIENT
Start: 2019-01-01 | End: 2019-01-01

## 2019-01-01 RX ORDER — ALPRAZOLAM 0.25 MG/1
0.25 TABLET ORAL 3 TIMES DAILY PRN
Status: DISCONTINUED | OUTPATIENT
Start: 2019-01-01 | End: 2019-01-01 | Stop reason: HOSPADM

## 2019-01-01 RX ORDER — ATORVASTATIN CALCIUM 40 MG/1
40 TABLET, FILM COATED ORAL EVERY EVENING
Status: DISCONTINUED | OUTPATIENT
Start: 2019-01-01 | End: 2019-01-01

## 2019-01-01 RX ORDER — COLCHICINE 0.6 MG/1
1.2 TABLET ORAL 2 TIMES DAILY
Status: DISCONTINUED | OUTPATIENT
Start: 2019-01-01 | End: 2019-01-01

## 2019-01-01 RX ORDER — CARVEDILOL 6.25 MG/1
3.12 TABLET ORAL 2 TIMES DAILY WITH MEALS
Status: DISCONTINUED | OUTPATIENT
Start: 2019-01-01 | End: 2019-01-01 | Stop reason: HOSPADM

## 2019-01-01 RX ORDER — HEPARIN SODIUM 10000 [USP'U]/100ML
3-20 INJECTION, SOLUTION INTRAVENOUS
Status: DISCONTINUED | OUTPATIENT
Start: 2019-01-01 | End: 2019-01-01

## 2019-01-01 RX ORDER — HEPARIN SODIUM 1000 [USP'U]/ML
4000 INJECTION, SOLUTION INTRAVENOUS; SUBCUTANEOUS ONCE
Status: COMPLETED | OUTPATIENT
Start: 2019-01-01 | End: 2019-01-01

## 2019-01-01 RX ORDER — INSULIN GLARGINE 100 [IU]/ML
14 INJECTION, SOLUTION SUBCUTANEOUS
Status: DISCONTINUED | OUTPATIENT
Start: 2019-01-01 | End: 2019-01-01

## 2019-01-01 RX ORDER — CEPHALEXIN 500 MG/1
500 CAPSULE ORAL EVERY 6 HOURS SCHEDULED
Status: COMPLETED | OUTPATIENT
Start: 2019-01-01 | End: 2019-01-01

## 2019-01-01 RX ORDER — ASPIRIN 81 MG/1
324 TABLET, CHEWABLE ORAL ONCE
Status: COMPLETED | OUTPATIENT
Start: 2019-01-01 | End: 2019-01-01

## 2019-01-01 RX ORDER — ASPIRIN 81 MG/1
324 TABLET, CHEWABLE ORAL 2 TIMES DAILY
Status: DISCONTINUED | OUTPATIENT
Start: 2019-01-01 | End: 2019-01-01

## 2019-01-01 RX ORDER — COLCHICINE 0.6 MG/1
0.6 TABLET ORAL 2 TIMES DAILY
Status: DISCONTINUED | OUTPATIENT
Start: 2019-01-01 | End: 2019-01-01

## 2019-01-01 RX ORDER — OMEPRAZOLE 20 MG/1
20 CAPSULE, DELAYED RELEASE ORAL DAILY
COMMUNITY
End: 2019-01-01 | Stop reason: HOSPADM

## 2019-01-01 RX ORDER — LACTOBACILLUS ACIDOPHILUS / LACTOBACILLUS BULGARICUS 100 MILLION CFU STRENGTH
1 GRANULES ORAL
Status: DISCONTINUED | OUTPATIENT
Start: 2019-01-01 | End: 2019-01-01 | Stop reason: HOSPADM

## 2019-01-01 RX ORDER — POTASSIUM CHLORIDE 20 MEQ/1
40 TABLET, EXTENDED RELEASE ORAL 2 TIMES DAILY
Status: COMPLETED | OUTPATIENT
Start: 2019-01-01 | End: 2019-01-01

## 2019-01-01 RX ORDER — HEPARIN SODIUM 1000 [USP'U]/ML
4000 INJECTION, SOLUTION INTRAVENOUS; SUBCUTANEOUS AS NEEDED
Status: DISCONTINUED | OUTPATIENT
Start: 2019-01-01 | End: 2019-01-01

## 2019-01-01 RX ORDER — ASPIRIN 81 MG/1
81 TABLET, CHEWABLE ORAL DAILY
Status: DISCONTINUED | OUTPATIENT
Start: 2019-01-01 | End: 2019-01-01

## 2019-01-01 RX ORDER — ATORVASTATIN CALCIUM 80 MG/1
80 TABLET, FILM COATED ORAL EVERY EVENING
Status: DISCONTINUED | OUTPATIENT
Start: 2019-01-01 | End: 2019-01-01

## 2019-01-01 RX ORDER — NITROGLYCERIN 0.4 MG/1
0.4 TABLET SUBLINGUAL
Status: DISCONTINUED | OUTPATIENT
Start: 2019-01-01 | End: 2019-01-01 | Stop reason: HOSPADM

## 2019-01-01 RX ORDER — CLOPIDOGREL BISULFATE 75 MG/1
300 TABLET ORAL ONCE
Status: COMPLETED | OUTPATIENT
Start: 2019-01-01 | End: 2019-01-01

## 2019-01-01 RX ORDER — ACETAMINOPHEN 325 MG/1
325 TABLET ORAL EVERY 8 HOURS PRN
Status: DISCONTINUED | OUTPATIENT
Start: 2019-01-01 | End: 2019-01-01 | Stop reason: HOSPADM

## 2019-01-01 RX ORDER — CLOPIDOGREL BISULFATE 75 MG/1
75 TABLET ORAL DAILY
Status: DISCONTINUED | OUTPATIENT
Start: 2019-01-01 | End: 2019-01-01 | Stop reason: HOSPADM

## 2019-01-01 RX ORDER — LANOLIN ALCOHOL/MO/W.PET/CERES
6 CREAM (GRAM) TOPICAL
Status: DISCONTINUED | OUTPATIENT
Start: 2019-01-01 | End: 2019-01-01 | Stop reason: HOSPADM

## 2019-01-01 RX ORDER — FAMOTIDINE 20 MG/1
20 TABLET, FILM COATED ORAL 2 TIMES DAILY
Status: DISCONTINUED | OUTPATIENT
Start: 2019-01-01 | End: 2019-01-01 | Stop reason: HOSPADM

## 2019-01-01 RX ADMIN — CEPHALEXIN 500 MG: 500 CAPSULE ORAL at 11:58

## 2019-01-01 RX ADMIN — INSULIN GLARGINE 12 UNITS: 100 INJECTION, SOLUTION SUBCUTANEOUS at 22:50

## 2019-01-01 RX ADMIN — LISINOPRIL 2.5 MG: 2.5 TABLET ORAL at 09:29

## 2019-01-01 RX ADMIN — ENOXAPARIN SODIUM 30 MG: 30 INJECTION SUBCUTANEOUS at 18:01

## 2019-01-01 RX ADMIN — SODIUM CHLORIDE 100 ML: 0.9 INJECTION, SOLUTION INTRAVENOUS at 09:15

## 2019-01-01 RX ADMIN — FAMOTIDINE 20 MG: 20 TABLET ORAL at 17:32

## 2019-01-01 RX ADMIN — FAMOTIDINE 20 MG: 20 TABLET ORAL at 17:56

## 2019-01-01 RX ADMIN — FAMOTIDINE 20 MG: 20 TABLET ORAL at 09:00

## 2019-01-01 RX ADMIN — CLOPIDOGREL BISULFATE 75 MG: 75 TABLET ORAL at 09:04

## 2019-01-01 RX ADMIN — MELATONIN TAB 3 MG 6 MG: 3 TAB at 00:28

## 2019-01-01 RX ADMIN — FAMOTIDINE 20 MG: 20 TABLET ORAL at 18:28

## 2019-01-01 RX ADMIN — ATORVASTATIN CALCIUM 20 MG: 10 TABLET, FILM COATED ORAL at 18:10

## 2019-01-01 RX ADMIN — HEPARIN SODIUM 2000 UNITS: 1000 INJECTION INTRAVENOUS; SUBCUTANEOUS at 07:04

## 2019-01-01 RX ADMIN — HEPARIN SODIUM AND DEXTROSE 12 UNITS/KG/HR: 10000; 5 INJECTION INTRAVENOUS at 18:41

## 2019-01-01 RX ADMIN — ASPIRIN 81 MG 81 MG: 81 TABLET ORAL at 11:23

## 2019-01-01 RX ADMIN — CEPHALEXIN 500 MG: 500 CAPSULE ORAL at 23:02

## 2019-01-01 RX ADMIN — FAMOTIDINE 20 MG: 20 TABLET ORAL at 11:22

## 2019-01-01 RX ADMIN — INSULIN LISPRO 2 UNITS: 100 INJECTION, SOLUTION INTRAVENOUS; SUBCUTANEOUS at 11:57

## 2019-01-01 RX ADMIN — LACTOBACILLUS ACIDOPHILUS / LACTOBACILLUS BULGARICUS 1 PACKET: 100 MILLION CFU STRENGTH GRANULES at 14:15

## 2019-01-01 RX ADMIN — INSULIN LISPRO 1 UNITS: 100 INJECTION, SOLUTION INTRAVENOUS; SUBCUTANEOUS at 14:23

## 2019-01-01 RX ADMIN — ATORVASTATIN CALCIUM 40 MG: 40 TABLET, FILM COATED ORAL at 20:58

## 2019-01-01 RX ADMIN — INSULIN GLARGINE 15 UNITS: 100 INJECTION, SOLUTION SUBCUTANEOUS at 21:51

## 2019-01-01 RX ADMIN — MORPHINE SULFATE 1 MG: 2 INJECTION, SOLUTION INTRAMUSCULAR; INTRAVENOUS at 09:12

## 2019-01-01 RX ADMIN — CARVEDILOL 3.12 MG: 6.25 TABLET, FILM COATED ORAL at 11:23

## 2019-01-01 RX ADMIN — ASPIRIN 81 MG 81 MG: 81 TABLET ORAL at 09:30

## 2019-01-01 RX ADMIN — MELATONIN TAB 3 MG 6 MG: 3 TAB at 22:21

## 2019-01-01 RX ADMIN — FAMOTIDINE 20 MG: 20 TABLET ORAL at 08:42

## 2019-01-01 RX ADMIN — FAMOTIDINE 20 MG: 20 TABLET ORAL at 08:29

## 2019-01-01 RX ADMIN — ATORVASTATIN CALCIUM 40 MG: 40 TABLET, FILM COATED ORAL at 16:58

## 2019-01-01 RX ADMIN — INSULIN GLARGINE 12 UNITS: 100 INJECTION, SOLUTION SUBCUTANEOUS at 22:14

## 2019-01-01 RX ADMIN — LACTOBACILLUS ACIDOPHILUS / LACTOBACILLUS BULGARICUS 1 PACKET: 100 MILLION CFU STRENGTH GRANULES at 18:28

## 2019-01-01 RX ADMIN — LISINOPRIL 2.5 MG: 2.5 TABLET ORAL at 08:41

## 2019-01-01 RX ADMIN — ATORVASTATIN CALCIUM 80 MG: 80 TABLET, FILM COATED ORAL at 17:13

## 2019-01-01 RX ADMIN — CLOPIDOGREL BISULFATE 75 MG: 75 TABLET ORAL at 08:29

## 2019-01-01 RX ADMIN — LACTOBACILLUS ACIDOPHILUS / LACTOBACILLUS BULGARICUS 1 PACKET: 100 MILLION CFU STRENGTH GRANULES at 11:07

## 2019-01-01 RX ADMIN — LACTOBACILLUS ACIDOPHILUS / LACTOBACILLUS BULGARICUS 1 PACKET: 100 MILLION CFU STRENGTH GRANULES at 08:42

## 2019-01-01 RX ADMIN — COLCHICINE 0.6 MG: 0.6 TABLET, FILM COATED ORAL at 17:13

## 2019-01-01 RX ADMIN — ASPIRIN 81 MG 324 MG: 81 TABLET ORAL at 09:04

## 2019-01-01 RX ADMIN — LACTOBACILLUS ACIDOPHILUS / LACTOBACILLUS BULGARICUS 1 PACKET: 100 MILLION CFU STRENGTH GRANULES at 10:19

## 2019-01-01 RX ADMIN — CITALOPRAM HYDROBROMIDE 10 MG: 20 TABLET ORAL at 09:02

## 2019-01-01 RX ADMIN — INSULIN LISPRO 1 UNITS: 100 INJECTION, SOLUTION INTRAVENOUS; SUBCUTANEOUS at 11:58

## 2019-01-01 RX ADMIN — INSULIN LISPRO 1 UNITS: 100 INJECTION, SOLUTION INTRAVENOUS; SUBCUTANEOUS at 00:28

## 2019-01-01 RX ADMIN — LACTOBACILLUS ACIDOPHILUS / LACTOBACILLUS BULGARICUS 1 PACKET: 100 MILLION CFU STRENGTH GRANULES at 12:30

## 2019-01-01 RX ADMIN — HEPARIN SODIUM AND DEXTROSE 17 UNITS/KG/HR: 10000; 5 INJECTION INTRAVENOUS at 06:53

## 2019-01-01 RX ADMIN — MELATONIN TAB 3 MG 6 MG: 3 TAB at 23:44

## 2019-01-01 RX ADMIN — CLOPIDOGREL BISULFATE 75 MG: 75 TABLET ORAL at 08:41

## 2019-01-01 RX ADMIN — INSULIN LISPRO 2 UNITS: 100 INJECTION, SOLUTION INTRAVENOUS; SUBCUTANEOUS at 16:57

## 2019-01-01 RX ADMIN — FAMOTIDINE 20 MG: 20 TABLET ORAL at 20:58

## 2019-01-01 RX ADMIN — COLCHICINE 0.6 MG: 0.6 TABLET, FILM COATED ORAL at 12:34

## 2019-01-01 RX ADMIN — HEPARIN SODIUM 2000 UNITS: 1000 INJECTION INTRAVENOUS; SUBCUTANEOUS at 20:40

## 2019-01-01 RX ADMIN — COLCHICINE 1.2 MG: 0.6 TABLET, FILM COATED ORAL at 08:28

## 2019-01-01 RX ADMIN — ASPIRIN 81 MG 324 MG: 81 TABLET ORAL at 17:56

## 2019-01-01 RX ADMIN — SODIUM CHLORIDE 1000 ML: 0.9 INJECTION, SOLUTION INTRAVENOUS at 17:12

## 2019-01-01 RX ADMIN — INSULIN LISPRO 1 UNITS: 100 INJECTION, SOLUTION INTRAVENOUS; SUBCUTANEOUS at 22:15

## 2019-01-01 RX ADMIN — ATORVASTATIN CALCIUM 20 MG: 10 TABLET, FILM COATED ORAL at 18:28

## 2019-01-01 RX ADMIN — CLOPIDOGREL BISULFATE 75 MG: 75 TABLET ORAL at 09:00

## 2019-01-01 RX ADMIN — CEPHALEXIN 500 MG: 500 CAPSULE ORAL at 17:56

## 2019-01-01 RX ADMIN — CITALOPRAM HYDROBROMIDE 10 MG: 20 TABLET ORAL at 08:21

## 2019-01-01 RX ADMIN — COLCHICINE 1.2 MG: 0.6 TABLET, FILM COATED ORAL at 16:58

## 2019-01-01 RX ADMIN — MELATONIN TAB 3 MG 6 MG: 3 TAB at 22:43

## 2019-01-01 RX ADMIN — POTASSIUM CHLORIDE 40 MEQ: 1500 TABLET, EXTENDED RELEASE ORAL at 17:00

## 2019-01-01 RX ADMIN — INSULIN LISPRO 1 UNITS: 100 INJECTION, SOLUTION INTRAVENOUS; SUBCUTANEOUS at 11:08

## 2019-01-01 RX ADMIN — MORPHINE SULFATE 1 MG: 2 INJECTION, SOLUTION INTRAMUSCULAR; INTRAVENOUS at 07:40

## 2019-01-01 RX ADMIN — INSULIN LISPRO 1 UNITS: 100 INJECTION, SOLUTION INTRAVENOUS; SUBCUTANEOUS at 11:33

## 2019-01-01 RX ADMIN — MORPHINE SULFATE 1 MG: 2 INJECTION, SOLUTION INTRAMUSCULAR; INTRAVENOUS at 16:12

## 2019-01-01 RX ADMIN — FAMOTIDINE 20 MG: 20 TABLET ORAL at 11:07

## 2019-01-01 RX ADMIN — NITROGLYCERIN 0.4 MG: 0.4 TABLET SUBLINGUAL at 12:37

## 2019-01-01 RX ADMIN — LACTOBACILLUS ACIDOPHILUS / LACTOBACILLUS BULGARICUS 1 PACKET: 100 MILLION CFU STRENGTH GRANULES at 11:57

## 2019-01-01 RX ADMIN — POTASSIUM CHLORIDE 40 MEQ: 1500 TABLET, EXTENDED RELEASE ORAL at 08:21

## 2019-01-01 RX ADMIN — ATORVASTATIN CALCIUM 20 MG: 10 TABLET, FILM COATED ORAL at 17:32

## 2019-01-01 RX ADMIN — INSULIN GLARGINE 15 UNITS: 100 INJECTION, SOLUTION SUBCUTANEOUS at 20:33

## 2019-01-01 RX ADMIN — HEPARIN SODIUM 2000 UNITS: 1000 INJECTION INTRAVENOUS; SUBCUTANEOUS at 01:50

## 2019-01-01 RX ADMIN — INSULIN LISPRO 1 UNITS: 100 INJECTION, SOLUTION INTRAVENOUS; SUBCUTANEOUS at 20:39

## 2019-01-01 RX ADMIN — COLCHICINE 1.2 MG: 0.6 TABLET, FILM COATED ORAL at 09:00

## 2019-01-01 RX ADMIN — ATORVASTATIN CALCIUM 80 MG: 80 TABLET, FILM COATED ORAL at 17:01

## 2019-01-01 RX ADMIN — HEPARIN SODIUM AND DEXTROSE 18 UNITS/KG/HR: 10000; 5 INJECTION INTRAVENOUS at 18:24

## 2019-01-01 RX ADMIN — MELATONIN TAB 3 MG 6 MG: 3 TAB at 20:33

## 2019-01-01 RX ADMIN — CLOPIDOGREL BISULFATE 75 MG: 75 TABLET ORAL at 11:21

## 2019-01-01 RX ADMIN — FAMOTIDINE 20 MG: 20 TABLET ORAL at 16:58

## 2019-01-01 RX ADMIN — CEPHALEXIN 500 MG: 500 CAPSULE ORAL at 06:02

## 2019-01-01 RX ADMIN — INSULIN GLARGINE 15 UNITS: 100 INJECTION, SOLUTION SUBCUTANEOUS at 22:52

## 2019-01-01 RX ADMIN — CITALOPRAM HYDROBROMIDE 10 MG: 20 TABLET ORAL at 08:39

## 2019-01-01 RX ADMIN — COLCHICINE 1.2 MG: 0.6 TABLET, FILM COATED ORAL at 17:56

## 2019-01-01 RX ADMIN — LACTOBACILLUS ACIDOPHILUS / LACTOBACILLUS BULGARICUS 1 PACKET: 100 MILLION CFU STRENGTH GRANULES at 18:01

## 2019-01-01 RX ADMIN — LACTOBACILLUS ACIDOPHILUS / LACTOBACILLUS BULGARICUS 1 PACKET: 100 MILLION CFU STRENGTH GRANULES at 14:22

## 2019-01-01 RX ADMIN — CARVEDILOL 3.12 MG: 6.25 TABLET, FILM COATED ORAL at 17:50

## 2019-01-01 RX ADMIN — MELATONIN TAB 3 MG 6 MG: 3 TAB at 21:44

## 2019-01-01 RX ADMIN — CLOPIDOGREL BISULFATE 75 MG: 75 TABLET ORAL at 09:49

## 2019-01-01 RX ADMIN — LACTOBACILLUS ACIDOPHILUS / LACTOBACILLUS BULGARICUS 1 PACKET: 100 MILLION CFU STRENGTH GRANULES at 09:31

## 2019-01-01 RX ADMIN — MELATONIN TAB 3 MG 6 MG: 3 TAB at 21:51

## 2019-01-01 RX ADMIN — ATORVASTATIN CALCIUM 40 MG: 40 TABLET, FILM COATED ORAL at 17:56

## 2019-01-01 RX ADMIN — INSULIN LISPRO 5 UNITS: 100 INJECTION, SOLUTION INTRAVENOUS; SUBCUTANEOUS at 17:13

## 2019-01-01 RX ADMIN — CEPHALEXIN 500 MG: 500 CAPSULE ORAL at 17:50

## 2019-01-01 RX ADMIN — ATORVASTATIN CALCIUM 20 MG: 10 TABLET, FILM COATED ORAL at 18:01

## 2019-01-01 RX ADMIN — COLCHICINE 0.6 MG: 0.6 TABLET, FILM COATED ORAL at 09:03

## 2019-01-01 RX ADMIN — FAMOTIDINE 20 MG: 20 TABLET ORAL at 09:29

## 2019-01-01 RX ADMIN — ASPIRIN 81 MG 324 MG: 81 TABLET ORAL at 08:29

## 2019-01-01 RX ADMIN — ASPIRIN 81 MG 81 MG: 81 TABLET ORAL at 08:41

## 2019-01-01 RX ADMIN — INSULIN GLARGINE 15 UNITS: 100 INJECTION, SOLUTION SUBCUTANEOUS at 21:44

## 2019-01-01 RX ADMIN — NITROGLYCERIN 0.4 MG: 0.4 TABLET SUBLINGUAL at 08:23

## 2019-01-01 RX ADMIN — INSULIN GLARGINE 12 UNITS: 100 INJECTION, SOLUTION SUBCUTANEOUS at 00:28

## 2019-01-01 RX ADMIN — ASPIRIN 81 MG 324 MG: 81 TABLET ORAL at 17:09

## 2019-01-01 RX ADMIN — INSULIN LISPRO 1 UNITS: 100 INJECTION, SOLUTION INTRAVENOUS; SUBCUTANEOUS at 12:30

## 2019-01-01 RX ADMIN — HEPARIN SODIUM AND DEXTROSE 18 UNITS/KG/HR: 10000; 5 INJECTION INTRAVENOUS at 07:07

## 2019-01-01 RX ADMIN — CITALOPRAM HYDROBROMIDE 10 MG: 20 TABLET ORAL at 09:29

## 2019-01-01 RX ADMIN — ASPIRIN 81 MG 324 MG: 81 TABLET ORAL at 12:34

## 2019-01-01 RX ADMIN — INSULIN LISPRO 2 UNITS: 100 INJECTION, SOLUTION INTRAVENOUS; SUBCUTANEOUS at 18:10

## 2019-01-01 RX ADMIN — INSULIN LISPRO 1 UNITS: 100 INJECTION, SOLUTION INTRAVENOUS; SUBCUTANEOUS at 22:52

## 2019-01-01 RX ADMIN — CLOPIDOGREL BISULFATE 75 MG: 75 TABLET ORAL at 11:07

## 2019-01-01 RX ADMIN — ASPIRIN 81 MG 81 MG: 81 TABLET ORAL at 09:49

## 2019-01-01 RX ADMIN — ASPIRIN 81 MG 324 MG: 81 TABLET ORAL at 09:00

## 2019-01-01 RX ADMIN — LACTOBACILLUS ACIDOPHILUS / LACTOBACILLUS BULGARICUS 1 PACKET: 100 MILLION CFU STRENGTH GRANULES at 17:51

## 2019-01-01 RX ADMIN — CITALOPRAM HYDROBROMIDE 10 MG: 20 TABLET ORAL at 11:07

## 2019-01-01 RX ADMIN — NITROGLYCERIN 0.4 MG: 0.4 TABLET SUBLINGUAL at 19:57

## 2019-01-01 RX ADMIN — INSULIN GLARGINE 14 UNITS: 100 INJECTION, SOLUTION SUBCUTANEOUS at 21:22

## 2019-01-01 RX ADMIN — MELATONIN TAB 3 MG 6 MG: 3 TAB at 22:49

## 2019-01-01 RX ADMIN — CLOPIDOGREL BISULFATE 75 MG: 75 TABLET ORAL at 09:29

## 2019-01-01 RX ADMIN — ASPIRIN 81 MG 324 MG: 81 TABLET ORAL at 16:58

## 2019-01-01 RX ADMIN — INSULIN LISPRO 1 UNITS: 100 INJECTION, SOLUTION INTRAVENOUS; SUBCUTANEOUS at 22:20

## 2019-01-01 RX ADMIN — INSULIN LISPRO 1 UNITS: 100 INJECTION, SOLUTION INTRAVENOUS; SUBCUTANEOUS at 21:45

## 2019-01-01 RX ADMIN — CARVEDILOL 3.12 MG: 6.25 TABLET, FILM COATED ORAL at 08:41

## 2019-01-01 RX ADMIN — INSULIN LISPRO 3 UNITS: 100 INJECTION, SOLUTION INTRAVENOUS; SUBCUTANEOUS at 12:18

## 2019-01-01 RX ADMIN — CEPHALEXIN 500 MG: 500 CAPSULE ORAL at 05:40

## 2019-01-01 RX ADMIN — CEPHALEXIN 500 MG: 500 CAPSULE ORAL at 23:13

## 2019-01-01 RX ADMIN — FAMOTIDINE 20 MG: 20 TABLET ORAL at 17:13

## 2019-01-01 RX ADMIN — MELATONIN TAB 3 MG 6 MG: 3 TAB at 22:52

## 2019-01-01 RX ADMIN — INSULIN LISPRO 3 UNITS: 100 INJECTION, SOLUTION INTRAVENOUS; SUBCUTANEOUS at 16:30

## 2019-01-01 RX ADMIN — CEPHALEXIN 500 MG: 500 CAPSULE ORAL at 13:54

## 2019-01-01 RX ADMIN — CARVEDILOL 3.12 MG: 6.25 TABLET, FILM COATED ORAL at 09:30

## 2019-01-01 RX ADMIN — FAMOTIDINE 20 MG: 20 TABLET ORAL at 18:01

## 2019-01-01 RX ADMIN — CEPHALEXIN 500 MG: 500 CAPSULE ORAL at 00:28

## 2019-01-01 RX ADMIN — HEPARIN SODIUM 4000 UNITS: 1000 INJECTION INTRAVENOUS; SUBCUTANEOUS at 18:39

## 2019-01-01 RX ADMIN — FAMOTIDINE 20 MG: 20 TABLET ORAL at 18:10

## 2019-01-01 RX ADMIN — INSULIN GLARGINE 15 UNITS: 100 INJECTION, SOLUTION SUBCUTANEOUS at 22:43

## 2019-01-01 RX ADMIN — ASPIRIN 81 MG 81 MG: 81 TABLET ORAL at 08:21

## 2019-01-01 RX ADMIN — CLOPIDOGREL BISULFATE 300 MG: 75 TABLET ORAL at 18:36

## 2019-01-01 RX ADMIN — LACTOBACILLUS ACIDOPHILUS / LACTOBACILLUS BULGARICUS 1 PACKET: 100 MILLION CFU STRENGTH GRANULES at 18:10

## 2019-01-01 RX ADMIN — HEPARIN SODIUM 2000 UNITS: 1000 INJECTION INTRAVENOUS; SUBCUTANEOUS at 14:34

## 2019-01-01 RX ADMIN — FAMOTIDINE 20 MG: 20 TABLET ORAL at 17:00

## 2019-01-01 RX ADMIN — ASPIRIN 81 MG 81 MG: 81 TABLET ORAL at 11:07

## 2019-01-01 RX ADMIN — INSULIN LISPRO 1 UNITS: 100 INJECTION, SOLUTION INTRAVENOUS; SUBCUTANEOUS at 13:57

## 2019-01-01 RX ADMIN — FAMOTIDINE 20 MG: 20 TABLET ORAL at 17:50

## 2019-01-01 RX ADMIN — CITALOPRAM HYDROBROMIDE 10 MG: 20 TABLET ORAL at 09:00

## 2019-01-01 RX ADMIN — CEPHALEXIN 500 MG: 500 CAPSULE ORAL at 11:33

## 2019-01-01 RX ADMIN — HEPARIN SODIUM AND DEXTROSE 16 UNITS/KG/HR: 10000; 5 INJECTION INTRAVENOUS at 17:01

## 2019-01-01 RX ADMIN — INSULIN LISPRO 2 UNITS: 100 INJECTION, SOLUTION INTRAVENOUS; SUBCUTANEOUS at 17:50

## 2019-01-01 RX ADMIN — INSULIN LISPRO 1 UNITS: 100 INJECTION, SOLUTION INTRAVENOUS; SUBCUTANEOUS at 22:50

## 2019-01-01 RX ADMIN — FAMOTIDINE 20 MG: 20 TABLET ORAL at 09:04

## 2019-01-01 RX ADMIN — CLOPIDOGREL BISULFATE 75 MG: 75 TABLET ORAL at 08:21

## 2019-01-01 RX ADMIN — ATORVASTATIN CALCIUM 20 MG: 10 TABLET, FILM COATED ORAL at 17:50

## 2019-01-01 RX ADMIN — FAMOTIDINE 20 MG: 20 TABLET ORAL at 08:21

## 2019-01-01 RX ADMIN — INSULIN LISPRO 1 UNITS: 100 INJECTION, SOLUTION INTRAVENOUS; SUBCUTANEOUS at 09:31

## 2019-01-01 RX ADMIN — CITALOPRAM HYDROBROMIDE 10 MG: 20 TABLET ORAL at 11:21

## 2019-01-01 RX ADMIN — INSULIN LISPRO 1 UNITS: 100 INJECTION, SOLUTION INTRAVENOUS; SUBCUTANEOUS at 21:00

## 2019-01-01 RX ADMIN — LISINOPRIL 2.5 MG: 2.5 TABLET ORAL at 11:22

## 2019-01-01 RX ADMIN — CEPHALEXIN 500 MG: 500 CAPSULE ORAL at 06:44

## 2019-01-01 RX ADMIN — CEPHALEXIN 500 MG: 500 CAPSULE ORAL at 20:45

## 2019-01-01 RX ADMIN — CITALOPRAM HYDROBROMIDE 10 MG: 20 TABLET ORAL at 08:28

## 2019-01-01 RX ADMIN — INFLUENZA A VIRUS A/MICHIGAN/45/2015 X-275 (H1N1) ANTIGEN (FORMALDEHYDE INACTIVATED), INFLUENZA A VIRUS A/SINGAPORE/INFIMH-16-0019/2016 IVR-186 (H3N2) ANTIGEN (FORMALDEHYDE INACTIVATED), AND INFLUENZA B VIRUS B/MARYLAND/15/2016 BX-69A (A B/COLORADO/6/2017-LIKE VIRUS) ANTIGEN (FORMALDEHYDE INACTIVATED) 0.5 ML: 60; 60; 60 INJECTION, SUSPENSION INTRAMUSCULAR at 08:22

## 2019-01-01 RX ADMIN — INSULIN GLARGINE 12 UNITS: 100 INJECTION, SOLUTION SUBCUTANEOUS at 22:21

## 2019-02-17 PROBLEM — I21.3 ACUTE ST ELEVATION MYOCARDIAL INFARCTION (STEMI) (HCC): Status: ACTIVE | Noted: 2019-01-01

## 2019-02-17 NOTE — ED ATTENDING ATTESTATION
Elsy Cantu MD, saw and evaluated the patient  I have discussed the patient with the resident/non-physician practitioner and agree with the resident's/non-physician practitioner's findings, Plan of Care, and MDM as documented in the resident's/non-physician practitioner's note, except where noted  All available labs and Radiology studies were reviewed  At this point I agree with the current assessment done in the Emergency Department  I have conducted an independent evaluation of this patient a history and physical is as follows:  Patient is [de-identified] yr old female, hx of HTN, DM, comes with complaint of nausea, generalized weakness, lethargy, lack of appetite, denies chest pain dyspnea abdominal pain  On exam patient is conscious, alert, oriented x2; no cranial nerve or focal neuro deficits, lungs clear, heart sounds normal, abdomen soft nontender  EKG shows ST elevation in V1, V2, V3 with Q-waves  Patient has no chest pain  Case discussed with Cardiology as below  ED Course as of Feb 18 0001   Sun Feb 17, 2019   1647 EKG shows ST elevation with Q-waves in V1 V2, V3; patient here for generalized weakness, lethargy, denies chest pain  ER workup started, stat page sent to Cardiology to discuss the case  18 Dr Lazaro Gil, Cardiologist called back, discussed about patient's status and willingness for catheterization procedure  I spoke with the patient in detail at bedside, patient does not want any procedure done, specifically no cardiac catheterization, says she is [de-identified], she has no family left, and if its her time, to let her pass peacefully  I informed Dr Lazaro Gil  We will admit to Medicine                  Critical Care Time  Procedures

## 2019-02-17 NOTE — ED NOTES
Per pt's friend pt "not eating right" relates pt "started eating junk" then "past three days she hasn't eating anything, she's been in bed the past three days"      Raimundo James RN  02/17/19 4893

## 2019-02-17 NOTE — H&P
H&P- Ilia Leung 1940, [de-identified] y o  female MRN: [id number]    Unit/Bed#: ED 14 Encounter: [id number]    Primary Care Provider: Miriam Smith MD   Date and time admitted to hospital: 2/17/2019  4:17 PM        * Acute ST elevation myocardial infarction (STEMI) [facility])  Assessment & Plan  This has been going on for one week  Patient is pain free, just complaining of fatigue  She did NOT want aggressive treatment like cardiac catheterization  She is DNR/DNI  I spoke with Dr Gabriel Krishna  He recommends IV heparin for 48 hours, ASA, Plavix,   He DID NOT want beta blocker or ace inhibitor yet  Diabetes [facility])  Assessment & Plan  Lab Results   Component Value Date    HGBA1C 11 2 (H) 01/18/2018       No results for input(s): POCGLU in the last 72 hours  Blood Sugar Average: Last 72 hrs: Will place on HISS and home meds          Chief Complaint:   fatigue      History of Present Illness:    Ilia Leung is a [de-identified] y o  female who presents with fatigue  Patient states that she has not felt well in the past 7 days  She has spent most days in bed because she was too fatigued to get out of bed  Her friend noticed this and urged her to come to the ER  She never had any chest pain  No SOB  No diaphoresis  She did have some mild nausea, but no vomiting  No fever or chills  In the ER she was found to have a STEMI and significantly elevated Trop  She did NOT want aggressive treatment like a cardiac catheterization  She says she is a DNR and "if it's my time, it's my time "   I discussed the case with her and she does agree to 48 hours of IV heparin         Review of Systems:    Review of Systems   Constitutional: Positive for activity change and fatigue  HENT: Negative  Eyes: Negative  Respiratory: Negative  Cardiovascular: Negative  Gastrointestinal: Positive for nausea  Negative for vomiting  Endocrine: Negative  Genitourinary: Negative  Musculoskeletal: Negative  Neurological: Negative  Hematological: Negative  All other systems reviewed and are negative  Past Medical and Surgical History:     Past Medical History:   Diagnosis Date    Arthritis     Diabetes mellitus ([facility]     Hypertension     Kidney failure     Memory loss     Retention of urine     Sepsis ([facility] )        Past Surgical History:   Procedure Laterality Date    OTHER SURGICAL HISTORY      states "can't recall"         Home Medications:    Prior to Admission medications    Medication Sig Start Date End Date Taking?  Authorizing Provider   cholecalciferol 1000 units tablet Take 1 tablet by mouth daily 1/25/18  Yes Emerson Meyers MD   citalopram (CeleXA) 10 mg tablet Take 1 tablet by mouth daily 1/25/18  Yes Emerson Meyers MD   insulin glargine (LANTUS) 100 units/mL subcutaneous injection Inject 12 Units under the skin daily at bedtime  Patient taking differently: Inject 14 Units under the skin daily at bedtime  1/24/18  Yes Emerson Meyers MD   metFORMIN (GLUCOPHAGE) 500 mg tablet Take 500 mg by mouth 2 (two) times a day with meals   Yes Historical Provider, MD   omeprazole (PriLOSEC) 20 mg delayed release capsule Take 20 mg by mouth daily   Yes Historical Provider, MD   acetaminophen (TYLENOL) 325 mg tablet Take 2 tablets by mouth every 6 (six) hours as needed for mild pain, headaches or fever 1/24/18 2/17/19  Emerson Meyers MD   aspirin 81 mg chewable tablet Chew 1 tablet daily 1/25/18 2/17/19  Emerson Meyers MD   budesonide-formoterol (SYMBICORT) 160-4 5 mcg/act inhaler Inhale 2 puffs 2 (two) times a day 1/24/18 2/17/19  Emerson Meyers MD   Empagliflozin 10 MG TABS Take 10 mg by mouth 7/7/16 2/17/19  Historical Provider, MD   glucose blood test strip for bid glucometry Dx 250 02 3/8/16 2/17/19  Historical Provider, MD   guaiFENesin (MUCINEX) 600 mg 12 hr tablet Take 1 tablet by mouth 2 (two) times a day 1/24/18 2/17/19  Emerson Meyers MD   levalbuterol (XOPENEX) 1 25 mg/0 5 mL nebulizer solution Take 0 5 mL by nebulization every 8 (eight) hours as needed for wheezing 1/24/18 2/17/19  Rigoberto Schwab MD   melatonin 3 mg Take 1 tablet by mouth daily at bedtime as needed (insomnia) 1/24/18 2/17/19  Rigoberto Schwab MD   pantoprazole (PROTONIX) 40 mg tablet Take 1 tablet by mouth daily in the early morning 1/25/18 2/17/19  Rigoberto Schwab MD   senna-docusate sodium (SENOKOT S) 8 6-50 mg per tablet Take 1 tablet by mouth daily at bedtime 1/24/18 2/17/19  Rigoberto Schwab MD   sitaGLIPtin (JANUVIA) 100 mg tablet Take 100 mg by mouth 8/1/16 2/17/19  Historical Provider, MD     I have reviewed home medications with patient personally  Allergies: No Known Allergies      Social History:    Substance Use History:   Social History     Substance and Sexual Activity   Alcohol Use No     Social History     Tobacco Use   Smoking Status Former Smoker   Smokeless Tobacco Never Used     Social History     Substance and Sexual Activity   Drug Use No         Family History:    non-contributory      Physical Exam:     Vitals:   Blood Pressure: 97/61 (02/17/19 1811)  Pulse: 82 (02/17/19 1811)  Temperature: 99 °F (37 2 °C) (02/17/19 1731)  Temp Source: Oral (02/17/19 1731)  Respirations: 16 (02/17/19 1811)  Weight - Scale: 76 6 kg (168 lb 14 oz) (02/17/19 1731)  SpO2: 94 % (02/17/19 1811)    Physical Exam   HENT:   Head: Normocephalic and atraumatic  Eyes: Pupils are equal, round, and reactive to light  EOM are normal    Cardiovascular: Normal rate and regular rhythm  Exam reveals no gallop and no friction rub  No murmur heard  Pulmonary/Chest: Effort normal and breath sounds normal  She has no wheezes  She has no rales  Abdominal: Soft  Bowel sounds are normal  There is no tenderness  Musculoskeletal: She exhibits no edema  Nursing note and vitals reviewed       Additional Data:     Lab Results: I have personally reviewed pertinent reports        Results from last 7 days Lab Units 02/17/19  1715 02/17/19  1637   WBC Thousand/uL  --  18 53*   HEMOGLOBIN g/dL  --  12 7   I STAT HEMOGLOBIN g/dl 12 9  --    HEMATOCRIT %  --  38 6   HEMATOCRIT, ISTAT % 38  --    PLATELETS Thousands/uL  --  344   NEUTROS PCT %  --  81*   LYMPHS PCT %  --  10*   MONOS PCT %  --  7   EOS PCT %  --  1     Results from last 7 days   Lab Units 02/17/19  1715 02/17/19  1637   POTASSIUM mmol/L  --  3 9   CHLORIDE mmol/L  --  95*   CO2 mmol/L  --  29   CO2, I-STAT mmol/L 27  --    BUN mg/dL  --  58*   CREATININE mg/dL  --  0 93   CALCIUM mg/dL  --  8 4   ALK PHOS U/L  --  72   ALT U/L  --  32   AST U/L  --  148*   GLUCOSE, ISTAT mg/dl 97  --                      Imaging: I have personally reviewed pertinent reports  XR chest 2 views    (Results Pending)         EKG, Pathology, and Other Studies Reviewed on Admission:   · EKG: NSR   ST elevations V1-V5  Some q waves      VTE Prophylaxis: Heparin Drip        Anticipated Length of Stay:  Patient will be admitted on an Inpatient basis with an anticipated length of stay of  Greater than  2 midnights  Justification for Hospital Stay: patient is having an acute myocardial infarction  She needs 48 hours of IV hearpin  Length of stay will be greater than 2 midnights      Total Time for Visit, including Counseling / Coordination of Care: 45 minutes  Greater than 50% of this total time spent on direct patient counseling and coordination of care  ** Please Note: This note has been constructed using a voice recognition system   **

## 2019-02-17 NOTE — ED PROVIDER NOTES
History  Chief Complaint   Patient presents with    Vomiting     weakness nausea vomiting x1wk  HPI    [de-identified] y o  F w/ hx of HTN DM presenting to ED for evaluation of 1 week of fatigue and nausea  She states that she had some episodes of nonbloody nonbilious emesis yesterday  She also has been bed ridden because of her fatigue for the past few days  She denies any chest pain at this time  She denies any shortness of breath  Patient does appear distressed at this time, she is diaphoretic, and endorses some episodic chills  She denies any sick contacts  She is unable to verify her medications, however her neighbor had called and mentioned that she does give her her insulin, and antihypertensives  Patient denies a history of MI  She states that she is DNR DNI  Denies any other symptoms on ROS  Unable to recall surgical history  Prior to Admission Medications   Prescriptions Last Dose Informant Patient Reported? Taking?    cholecalciferol 1000 units tablet  Care Giver No Yes   Sig: Take 1 tablet by mouth daily   citalopram (CeleXA) 10 mg tablet  Care Giver No Yes   Sig: Take 1 tablet by mouth daily   insulin glargine (LANTUS) 100 units/mL subcutaneous injection  Care Giver No Yes   Sig: Inject 12 Units under the skin daily at bedtime   Patient taking differently: Inject 14 Units under the skin daily at bedtime    metFORMIN (GLUCOPHAGE) 500 mg tablet  Care Giver Yes Yes   Sig: Take 500 mg by mouth 2 (two) times a day with meals   omeprazole (PriLOSEC) 20 mg delayed release capsule  Care Giver Yes Yes   Sig: Take 20 mg by mouth daily      Facility-Administered Medications: None       Past Medical History:   Diagnosis Date    Arthritis     Diabetes mellitus ([facility] [address] [facility]     Hypertension     Kidney failure     Memory loss     Retention of urine     Sepsis ([facility] [address] )        Past Surgical History:   Procedure Laterality Date    OTHER SURGICAL HISTORY      states "can't recall"       Family History   Family history unknown: Yes     I have reviewed and agree with the history as documented  Social History     Tobacco Use    Smoking status: Former Smoker    Smokeless tobacco: Never Used   Substance Use Topics    Alcohol use: No    Drug use: No        Review of Systems    Physical Exam  ED Triage Vitals   Temperature Pulse Respirations Blood Pressure SpO2   02/17/19 1731 02/17/19 1624 02/17/19 1624 02/17/19 1624 02/17/19 1624   99 °F (37 2 °C) 84 16 92/60 93 %      Temp Source Heart Rate Source Patient Position - Orthostatic VS BP Location FiO2 (%)   02/17/19 1731 -- 02/17/19 1624 02/17/19 1624 --   Oral  Lying Right arm       Pain Score       02/17/19 1624       No Pain           Orthostatic Vital Signs  Vitals:    02/17/19 1624 02/17/19 1656 02/17/19 1811   BP: 92/60 105/61 97/61   Pulse: 84 86 82   Patient Position - Orthostatic VS: Lying Lying Lying       Physical Exam   Constitutional: She appears well-developed and well-nourished  No distress  HENT:   Head: Normocephalic and atraumatic  Eyes: Pupils are equal, round, and reactive to light  Conjunctivae are normal    Neck: Normal range of motion  Cardiovascular: Normal rate and regular rhythm  No murmur heard  Heart sounds are distant   Pulmonary/Chest: Effort normal and breath sounds normal  No respiratory distress  Abdominal: Soft  Bowel sounds are normal  There is no tenderness  Musculoskeletal: Normal range of motion  Neurological: She is alert  No cranial nerve deficit or sensory deficit  She exhibits normal muscle tone  Coordination normal    Oriented to person and year only  She does know she is in the hospital    Skin: Skin is warm and dry  No rash noted  Psychiatric: She has a normal mood and affect  Nursing note and vitals reviewed        ED Medications  Medications   clopidogrel (PLAVIX) tablet 300 mg (has no administration in time range)   heparin (porcine) injection 4,000 Units (has no administration in time range)   heparin (porcine) 25,000 units in 250 mL infusion (premix) (has no administration in time range)   heparin (porcine) injection 4,000 Units (has no administration in time range)   heparin (porcine) injection 2,000 Units (has no administration in time range)   sodium chloride 0 9 % bolus 1,000 mL (0 mL Intravenous Stopped 2/17/19 1810)   aspirin chewable tablet 324 mg (324 mg Oral Given 2/17/19 1709)       Diagnostic Studies  Results Reviewed     Procedure Component Value Units Date/Time    NT-BNP PRO (BNP for [place], [place], [place], [place], [place], [facility], [facility], [place] campuses) [[id number]]  (Abnormal) Collected:  02/17/19 1637    Lab Status:  Final result Specimen:  Blood from Arm, Left Updated:  02/17/19 1821     NT-proBNP 6,562 pg/mL     APTT six (6) hours after Heparin bolus/drip initiation or dosing change [[id number]]     Lab Status:  No result Specimen:  Blood     CBC [[id number]]     Lab Status:  No result Specimen:  Blood     Protime-INR [[id number]]     Lab Status:  No result Specimen:  Blood     Influenza A/B and RSV by PCR [[id number]] Collected:  02/17/19 1722    Lab Status:   In process Specimen:  Nasopharyngeal Swab Updated:  02/17/19 1725    Troponin I [[id number]]  (Abnormal) Collected:  02/17/19 1645    Lab Status:  Final result Specimen:  Blood from Arm, Left Updated:  02/17/19 1720     Troponin I 38 91 ng/mL     POCT Chem 8+ [[id number]]  (Abnormal) Collected:  02/17/19 1715    Lab Status:  Final result Specimen:  Venous Updated:  02/17/19 1719     SODIUM, I-STAT 134 mmol/l      Potassium, i-STAT 3 1 mmol/L      Chloride, istat 93 mmol/L      CO2, i-STAT 27 mmol/L      Anion Gap, i-STAT 18 mmol/L      Calcium, Ionized i-STAT 0 97 mmol/L      BUN, I-STAT 50 mg/dl      Creatinine, i-STAT 0 9 mg/dl      eGFR 61 ml/min/1 73sq m      Glucose, i-STAT 97 mg/dl      Hct, i-STAT 38 %      Hgb, i-STAT 12 9 g/dl      Specimen Type VENOUS    Comprehensive metabolic panel [[id number]]  (Abnormal) Collected:  02/17/19 1018    Lab Status:  Final result Specimen: Blood from Arm, Left Updated:  02/17/19 1708     Sodium 134 mmol/L      Potassium 3 9 mmol/L      Chloride 95 mmol/L      CO2 29 mmol/L      ANION GAP 10 mmol/L      BUN 58 mg/dL      Creatinine 0 93 mg/dL      Glucose 98 mg/dL      Calcium 8 4 mg/dL       U/L      ALT 32 U/L      Alkaline Phosphatase 72 U/L      Total Protein 7 1 g/dL      Albumin 2 4 g/dL      Total Bilirubin 1 11 mg/dL      eGFR 58 ml/min/1 73sq m     Narrative:       National Kidney Disease Education Program recommendations are as follows:  GFR calculation is accurate only with a steady state creatinine  Chronic Kidney disease less than 60 ml/min/1 73 sq  meters  Kidney failure less than 15 ml/min/1 73 sq  meters      Lipase [[id number]]  (Normal) Collected:  02/17/19 1637    Lab Status:  Final result Specimen:  Blood from Arm, Left Updated:  02/17/19 1708     Lipase 271 u/L     CBC and differential [[id number]]  (Abnormal) Collected:  02/17/19 1637    Lab Status:  Final result Specimen:  Blood from Arm, Left Updated:  02/17/19 1645     WBC 18 53 Thousand/uL      RBC 4 33 Million/uL      Hemoglobin 12 7 g/dL      Hematocrit 38 6 %      MCV 89 fL      MCH 29 3 pg      MCHC 32 9 g/dL      RDW 13 1 %      MPV 11 3 fL      Platelets 150 Thousands/uL      nRBC 0 /100 WBCs      Neutrophils Relative 81 %      Immat GRANS % 1 %      Lymphocytes Relative 10 %      Monocytes Relative 7 %      Eosinophils Relative 1 %      Basophils Relative 0 %      Neutrophils Absolute 15 29 Thousands/µL      Immature Grans Absolute 0 11 Thousand/uL      Lymphocytes Absolute 1 78 Thousands/µL      Monocytes Absolute 1 20 Thousand/µL      Eosinophils Absolute 0 09 Thousand/µL      Basophils Absolute 0 06 Thousands/µL     POCT urinalysis dipstick [[id number]]     Lab Status:  No result Specimen:  Urine                  XR chest 2 views    (Results Pending)         Procedures  ECG 12 Lead Documentation  Date/Time: 2/17/2019 4:59 PM  Performed by: Eugenie Stewart MD  Authorized by: Mandeep Horton MD     Patient location:  ED  Previous ECG:     Previous ECG:  Compared to current    Comparison ECG info:  Sinus tachycardia    Similarity:  Changes noted  Interpretation:     Interpretation: abnormal    Rate:     ECG rate assessment: normal    Rhythm:     Rhythm: sinus rhythm    ST segments:     ST segments:  Abnormal    Elevation:  V1, V2, V3, V4, V6 and V5  T waves:     T waves: inverted      Inverted:  V1, V2 and V3  Q waves:     Q waves:  V2, V1, V3, V4 and V5          Phone Consults  ED Phone Contact    ED Course  ED Course as of Feb 17 1827   Sun Feb 17, 2019   1707 WBC(!): 18 53   1711 Sodium(!): 134   1729 Troponin I(!): 38 91           MDM    23-year-old female who presents to the ED for evaluation of fatigue, nausea and vomiting  EKG on arrival showed ST elevations in multiple leads, see documentation above  Troponin at 38  Patient said she would not like to have any intervention, case was discussed with Cardiology, a STEMI alert was deferred at this time, patient may have been having ischemia for several days now  DNR DNI status was confirmed  Patient given aspirin  Cardiology will continue to follow  At this time they recommend heparin ACS, aspirin, Plavix 300 mg  Patient admitted to Medicine for further management      Disposition  Final diagnoses:   [facility] ([facility] elevation [facility]   Nausea & vomiting   Elevated troponin     Time reflects when diagnosis was documented in both MDM as applicable and the Disposition within this note     Time User Action Codes Description Comment    2/17/2019  5:32 PM Sharon Chroman Add [I21 3] STEMI (ST elevation myocardial infarction) ([facility] [address] )     2/17/2019  6:17 PM Sharon Chroman Add [R11 2] Nausea & vomiting     2/17/2019  6:17 PM Sharon Chroman Add [R74 8] Elevated troponin       ED Disposition     ED Disposition Condition Date/Time Comment    Admit Stable Eryn Feb 17, 2019  6:16 PM Case was discussed with RO and the patient's admission status was agreed to be Admission Status: inpatient status to the service of Dr Rob Wiggins         Follow-up Information    None         Patient's Medications   Discharge Prescriptions    No medications on file     No discharge procedures on file  ED Provider  Attending physically available and evaluated Sukhwinder Encarnacion I managed the patient along with the ED Attending      Electronically Signed by         Marquis Selena MD  02/17/19 3226

## 2019-02-17 NOTE — ASSESSMENT & PLAN NOTE
Lab Results   Component Value Date    HGBA1C 11 2 (H) 01/18/2018       No results for input(s): POCGLU in the last 72 hours  Blood Sugar Average: Last 72 hrs:        Will place on HISS and home meds

## 2019-02-17 NOTE — ASSESSMENT & PLAN NOTE
This has been going on for one week  Patient is pain free, just complaining of fatigue  She did NOT want aggressive treatment like cardiac catheterization  She is DNR/DNI  I spoke with Dr Nagi Felder  He recommends IV heparin for 48 hours, ASA, Plavix,   He DID NOT want beta blocker or ace inhibitor yet

## 2019-02-18 PROBLEM — Z79.4 TYPE 2 DIABETES MELLITUS, WITH LONG-TERM CURRENT USE OF INSULIN (HCC): Status: ACTIVE | Noted: 2018-01-16

## 2019-02-18 PROBLEM — R41.3 MEMORY IMPAIRMENT: Status: ACTIVE | Noted: 2019-01-01

## 2019-02-18 NOTE — ASSESSMENT & PLAN NOTE
· Ongoing for one week  · Patient is pain free with complaints of fatigue  · No aggressive treatment like cardiac catheterization desired  · DNR/DNI  · Cardiology recommends IV heparin for 48 hours, ASA, Plavix, no BB or ACE at this time due to low BP's  Will likely need furosemide as well  · Echo with EF at 28%, severely reduced from January 2018 EF 60%  · Will await further Cardiology recommendations

## 2019-02-18 NOTE — ASSESSMENT & PLAN NOTE
Lab Results   Component Value Date    HGBA1C 11 2 (H) 01/18/2018       Recent Labs     02/17/19 2058 02/18/19  0746 02/18/19  1120   POCGLU 170* 93 184*       Blood Sugar Average: Last 72 hrs:  (P) 149   · Maintained on Metformin and basal insulin per home regimen  · Will continue basal insulin and add sliding scale  · Monitor FSBS ac / hs

## 2019-02-18 NOTE — PLAN OF CARE
Problem: Potential for Falls  Goal: Patient will remain free of falls  Description  INTERVENTIONS:  - Assess patient frequently for physical needs  -  Identify cognitive and physical deficits and behaviors that affect risk of falls    -  [place] fall precautions as indicated by assessment   - Educate patient/family on patient safety including physical limitations  - Instruct patient to call for assistance with activity based on assessment  - Modify environment to reduce risk of injury  - Consider OT/PT consult to assist with strengthening/mobility  Outcome: Progressing     Problem: Prexisting or High Potential for Compromised Skin Integrity  Goal: Skin integrity is maintained or improved  Description  INTERVENTIONS:  - Identify patients at risk for skin breakdown  - Assess and monitor skin integrity  - Assess and monitor nutrition and hydration status  - Monitor labs (i e  albumin)  - Assess for incontinence   - Turn and reposition patient  - Assist with mobility/ambulation  - Relieve pressure over bony prominences  - Avoid friction and shearing  - Provide appropriate hygiene as needed including keeping skin clean and dry  - Evaluate need for skin moisturizer/barrier cream  - Collaborate with interdisciplinary team (i e  Nutrition, Rehabilitation, etc )   - Patient/family teaching  Outcome: Progressing     Problem: PAIN - ADULT  Goal: Verbalizes/displays adequate comfort level or baseline comfort level  Description  Interventions:  - Encourage patient to monitor pain and request assistance  - Assess pain using appropriate pain scale  - Administer analgesics based on type and severity of pain and evaluate response  - Implement non-pharmacological measures as appropriate and evaluate response  - Consider cultural and social influences on pain and pain management  - Notify physician/advanced practitioner if interventions unsuccessful or patient reports new pain  Outcome: Progressing     Problem: SAFETY ADULT  Goal: Maintain or return to baseline ADL function  Description  INTERVENTIONS:  -  Assess patient's ability to carry out ADLs; assess patient's baseline for ADL function and identify physical deficits which impact ability to perform ADLs (bathing, care of mouth/teeth, toileting, grooming, dressing, etc )  - Assess/evaluate cause of self-care deficits   - Assess range of motion  - Assess patient's mobility; develop plan if impaired  - Assess patient's need for assistive devices and provide as appropriate  - Encourage maximum independence but intervene and supervise when necessary  ¯ Involve family in performance of ADLs  ¯ Assess for home care needs following discharge   ¯ Request OT consult to assist with ADL evaluation and planning for discharge  ¯ Provide patient education as appropriate  Outcome: Progressing  Goal: Maintain or return mobility status to optimal level  Description  INTERVENTIONS:  - Assess patient's baseline mobility status (ambulation, transfers, stairs, etc )    - Identify cognitive and physical deficits and behaviors that affect mobility  - Identify mobility aids required to assist with transfers and/or ambulation (gait belt, sit-to-stand, lift, walker, cane, etc )  - [place] fall precautions as indicated by assessment  - Record patient progress and toleration of activity level on Mobility SBAR; progress patient to next Phase/Stage  - Instruct patient to call for assistance with activity based on assessment  - Request Rehabilitation consult to assist with strengthening/weightbearing, etc   Outcome: Progressing     Problem: DISCHARGE PLANNING  Goal: Discharge to home or other facility with appropriate resources  Description  INTERVENTIONS:  - Identify barriers to discharge w/patient and caregiver  - Arrange for needed discharge resources and transportation as appropriate  - Identify discharge learning needs (meds, wound care, etc )  - Arrange for interpretive services to assist at discharge as needed  - Refer to Case Management Department for coordinating discharge planning if the patient needs post-hospital services based on physician/advanced practitioner order or complex needs related to functional status, cognitive ability, or social support system  Outcome: Progressing     Problem: Knowledge Deficit  Goal: Patient/family/caregiver demonstrates understanding of disease process, treatment plan, medications, and discharge instructions  Description  Complete learning assessment and assess knowledge base  Interventions:  - Provide teaching at level of understanding  - Provide teaching via preferred learning methods  Outcome: Progressing     Problem: CARDIOVASCULAR - ADULT  Goal: Maintains optimal cardiac output and hemodynamic stability  Description  INTERVENTIONS:  - Monitor I/O, vital signs and rhythm  - Monitor for S/S and trends of decreased cardiac output i e  bleeding, hypotension  - Assess quality of pulses, skin color and temperature  - Assess for signs of decreased coronary artery perfusion - ex   Angina  - Instruct patient to report change in severity of symptoms   Outcome: Progressing  Goal: Absence of cardiac dysrhythmias or at baseline rhythm  Description  INTERVENTIONS:  - Continuous cardiac monitoring, monitor vital signs, obtain 12 lead EKG if indicated  - Administer antiarrhythmic and heart rate control medications as ordered  - Monitor electrolytes and administer replacement therapy as ordered  Outcome: Progressing     Problem: SKIN/TISSUE INTEGRITY - ADULT  Goal: Skin integrity remains intact  Description  INTERVENTIONS  - Identify patients at risk for skin breakdown  - Assess and monitor skin integrity  - Assess and monitor nutrition and hydration status  - Assess for incontinence   - Turn and reposition patient  - Assist with mobility/ambulation  - Relieve pressure over bony prominences  - Avoid friction and shearing  - Provide appropriate hygiene as needed including keeping skin clean and dry  - Evaluate need for skin moisturizer/barrier cream  - Collaborate with interdisciplinary team (i e  Nutrition, Rehabilitation, etc )   - Patient/family teaching   Outcome: Progressing  Goal: Incision(s), wounds(s) or drain site(s) healing without S/S of infection  Description  INTERVENTIONS  - Assess and document risk factors for skin impairment   - Assess and document dressing, incision, wound bed, drain sites and surrounding tissue  - Initiate Nutrition services consult and/or wound management as needed  Outcome: Progressing

## 2019-02-18 NOTE — ASSESSMENT & PLAN NOTE
· Patient with significant short term memory difficulties  · Lives alone with the assistance of a neighbor who is a paid caregiver  · Will consult Case Management for safe discharge plan

## 2019-02-18 NOTE — UTILIZATION REVIEW
Initial Clinical Review    Admission: Date/Time/Statement: 2/17/19 @ 1819 Inpatient Written   Orders Placed This Encounter   Procedures    Inpatient Admission     Standing Status:   Standing     Number of Occurrences:   1     Order Specific Question:   Admitting Physician     Answer:   Esau Lopez [[id number]]     Order Specific Question:   Level of Care     Answer:   Med Surg [16]     Order Specific Question:   Estimated length of stay     Answer:   More than 2 Midnights     Order Specific Question:   Certification     Answer:   I certify that inpatient services are medically necessary for this patient for a duration of greater than two midnights  See H&P and MD Progress Notes for additional information about the patient's course of treatment  ED: Date/Time/Mode of Arrival:   ED Arrival Information     Expected Arrival Acuity Means of Arrival Escorted By Service Admission Type    - 2/17/2019 16:17 [facility]    Arrival Complaint    weakness        Chief Complaint:   Chief Complaint   Patient presents with    Vomiting     weakness nausea vomiting x1wk  History of Illness: [de-identified] y o  F w/ hx of HTN DM presenting to ED for evaluation of 1 week of fatigue and nausea  She states that she had some episodes of nonbloody nonbilious emesis yesterday  She also has been bed ridden because of her fatigue for the past few days  She denies any chest pain at this time  She denies any shortness of breath  Patient does appear distressed at this time, she is diaphoretic, and endorses some episodic chills  She denies any sick contacts  She is unable to verify her medications, however her neighbor had called and mentioned that she does give her insulin, and antihypertensives      ED Vital Signs:   ED Triage Vitals   Temperature Pulse Respirations Blood Pressure SpO2   02/17/19 1731 02/17/19 1624 02/17/19 1624 02/17/19 1624 02/17/19 1624   99 °F (37 2 °C) 84 16 92/60 93 %      Temp Source Heart Rate Source Patient Position - Orthostatic VS BP Location FiO2 (%)   02/17/19 1731 02/18/19 0742 02/17/19 1624 02/17/19 1624 --   Oral Monitor Lying Right arm       Pain Score       02/17/19 1624       No Pain        Wt Readings from Last 1 Encounters:   02/17/19 75 4 kg (166 lb 3 6 oz)     Vital Signs (abnormal): TEMP 97 1, RESPS 29, BP 97/61, 98/55, 94/61, 90/58  Pertinent Labs/Diagnostic Test Results: WBC 18 53, CL 95, BUN 58, , , ALB 2 4, BNP 6562, PT 15 0  TROP 38 91, 39 20, 34 82  CXR:  NAD  EKG:  shows ST elevation in V1, V2, V3 with Q-waves  ED Treatment:   Medication Administration from 02/17/2019 1616 to 02/17/2019 1915       Date/Time Order Dose Route Action     02/17/2019 1712 sodium chloride 0 9 % bolus 1,000 mL 1,000 mL Intravenous New Bag     02/17/2019 1709 aspirin chewable tablet 324 mg 324 mg Oral Given     02/17/2019 1836 clopidogrel (PLAVIX) tablet 300 mg 300 mg Oral Given     02/17/2019 1839 heparin (porcine) injection 4,000 Units 4,000 Units Intravenous Given     02/17/2019 1841 heparin (porcine) 25,000 units in 250 mL infusion (premix) 12 Units/kg/hr Intravenous New Bag        Past Medical/Surgical History:    Active Ambulatory Problems     Diagnosis Date Noted    Encephalopathy 01/07/2018    Risk for falls 01/15/2018    Debility 01/15/2018    Diabetes ([facility] [address] ) 01/16/2018    Urinary retention 02/02/2018     Past Medical History:   Diagnosis Date    Arthritis     Diabetes mellitus ([facility] [address] [facility]     Hypertension     Kidney failure     Memory loss     Retention of urine     Sepsis ([facility] [place] [address] )      Admitting Diagnosis: Weakness [R53 1]  Nausea & vomiting [R11 2]  STEMI (ST elevation myocardial infarction) ([facility] [address] ) [I21 3]  Elevated troponin [R74 8]  Age/Sex: [de-identified] y o  female  Assessment/Plan:   Acute ST elevation myocardial infarction (STEMI) [facility])  Assessment & Plan  This has been going on for one week  Patient is pain free, just complaining of fatigue  She did NOT want aggressive treatment like cardiac catheterization  She is DNR/DNI  I spoke with Dr Keith Santo  He recommends IV heparin for 48 hours, ASA, Plavix,   He DID NOT want beta blocker or ace inhibitor yet  Diabetes [facility])  Assessment & Plan  Will place on HISS and home meds  VTE Prophylaxis: Heparin Drip    Anticipated Length of Stay:  Patient will be admitted on an Inpatient basis with an anticipated length of stay of  Greater than  2 midnights  Justification for Hospital Stay: patient is having an acute myocardial infarction  She needs 48 hours of IV hearpin  Length of stay will be greater than 2 midnights  Admission Orders:  Telemetry, serial trops  Cardiac diet  OOB with assist  Accuchecks QAC and QHS with coverage  Echo  Consult cardiology  Scheduled Meds:   Current Facility-Administered Medications:  acetaminophen 325 mg Oral Q8H PRN   aspirin 81 mg Oral Daily   atorvastatin 40 mg Oral QPM   citalopram 10 mg Oral Daily   clopidogrel 75 mg Oral Daily   famotidine 20 mg Oral BID   heparin (porcine) 3-20 Units/kg/hr (Order-Specific) Intravenous Titrated   heparin (porcine) 2,000 Units Intravenous PRN   heparin (porcine) 4,000 Units Intravenous PRN   insulin glargine 14 Units Subcutaneous HS   insulin lispro 1-5 Units Subcutaneous HS   insulin lispro 1-6 Units Subcutaneous TID AC   nitroglycerin 0 4 mg Sublingual Q5 Min PRN   potassium chloride 40 mEq Oral BID     Continuous Infusions:   heparin (porcine) 3-20 Units/kg/hr (Order-Specific) Last Rate: 14 Units/kg/hr (02/18/19 0146)     PRN Meds:   acetaminophen    heparin (porcine)    heparin (porcine)    nitroglycerin    Network Utilization Review Department  Phone: [phone]; Fax [phone]  [email or web address]  ATTENTION: Please call with any questions or concerns to [phone]  and carefully listen to the prompts so that you are directed to the right person     Send all requests for admission clinical reviews, approved or denied determinations and any other requests to fax [phone]   All voicemails are confidential

## 2019-02-18 NOTE — CONSULTS
Consult - Cardiology   Mercedez Lopez [de-identified] y o  female MRN: [id number]  Unit/Bed#: E4 -01 Encounter: [id number]        Reason For Consult:    STEMI             Assessment and Plan:     1  STEMI  Troponin 38 --> 39 --> 34  EKG with ST elevation in V2, V3 with reciprocal depression in V5  Patient does not want cardiac catheterization    2  Hypertension  Blood pressure currently on the lower side not on medications     3  Diabetes mellitus type 2       Plan:   STEMI --> Adamantly does not want cardiac cath         Heparin drip X 48 hours, continue until tomorrow         Pt agreeable to daily aspirin/ statin/ plavix  Continue same         BP low normal, hold of for BB for now         Limited echo pending      HTN --> BP low currently        History Of Present Illness: This is an 47-year-old female with a past medical history significant for hypertension, diabetes mellitus type 2 who presented to the hospital yesterday 02/17/2019 secondary to fatigue  The patient herself is a questionable historian  She is oriented to person and place but is not oriented to time  She says she lives by herself however she does have a neighbor who comes and checks in on her every few days  She denies any cardiac past medical history and has never seen a cardiologist   She is unsure if she has any heart disease in the family  Her main symptom which she is reporting today is generalized fatigue  He says this has been going on for about 1 week  Nothing seems to make it better or worse and she would not provide details and her symptoms  She is reporting some epigastric/substernal chest pain as well and was able to tell me this has been going on for quite some time but was unable to provide further details  By the end of our conversation she stated that the chest pain was going away    She thinks that yesterday she may have passed out in her apartment and was found by her neighbor who notified EMS to bring her to the hospital     On the emergency department she was found to have ST-elevation in the anterior leads of her EKG  Her troponin was elevated up to 39  The patient was abdomen that she did not want a cardiac catheterization and did not want to pursue aggressive management  Our on-call cardiologist was notified who recommended starting heparin drip X 48 hrs  At the time of my consultation patient is still saying that she does not want to undergo cardiac catheterization but she is agreeable to medical management  Past Medical History:        Past Medical History:   Diagnosis Date    Arthritis     Diabetes mellitus ([facility] [place] [address] [facility]     Hypertension     Kidney failure     Memory loss     Retention of urine     [facility]       Past Surgical History:   Procedure Laterality Date    OTHER SURGICAL HISTORY      states "can't recall"        Allergy:        No Known Allergies    Medications:       Prior to Admission medications    Medication Sig Start Date End Date Taking?  Authorizing Provider   cholecalciferol 1000 units tablet Take 1 tablet by mouth daily  Patient not taking: Reported on 2/17/2019 1/25/18   Omer Hammer MD   citalopram (CeleXA) 10 mg tablet Take 1 tablet by mouth daily  Patient not taking: Reported on 2/17/2019 1/25/18   Omer Hammer MD   insulin glargine (LANTUS) 100 units/mL subcutaneous injection Inject 12 Units under the skin daily at bedtime  Patient not taking: Reported on 2/17/2019 1/24/18   Omer Hammer MD   metFORMIN (GLUCOPHAGE) 500 mg tablet Take 500 mg by mouth 2 (two) times a day with meals    Historical Provider, MD   omeprazole (PriLOSEC) 20 mg delayed release capsule Take 20 mg by mouth daily    Historical Provider, MD       Family History:     Family History   Family history unknown: Yes        Social History:       Social History     Socioeconomic History    Marital status: Single     Spouse name: None    Number of children: None    Years of education: None    Highest education level: None   Occupational History    None   Social Needs    Financial resource strain: None    Food insecurity:     Worry: None     Inability: None    Transportation needs:     Medical: None     Non-medical: None   Tobacco Use    Smoking status: Former Smoker    Smokeless tobacco: Never Used   Substance and Sexual Activity    Alcohol use: No    Drug use: No    Sexual activity: None   Lifestyle    Physical activity:     Days per week: None     Minutes per session: None    Stress: None   Relationships    Social connections:     Talks on phone: None     Gets together: None     Attends [employer] service: None     Active member of club or organization: None     Attends meetings of clubs or organizations: None     Relationship status: None    Intimate partner violence:     Fear of current or ex partner: None     Emotionally abused: None     Physically abused: None     Forced sexual activity: None   Other Topics Concern    None   Social History Narrative    None       ROS:  Symptoms per HPI  Remainder review of systems is negative    Exam:  General:  Oriented to place and person, cooperative  Pleasant  Head: Normocephalic, atraumatic  Eyes:  EOMI  Pupils - equal, round, reactive to accomodation  No icterus  Normal Conjunctiva  Oropharynx: moist and normal-appearing mucosa  Neck: supple, symmetrical, trachea midline and no JVD  Heart:  Distant heart sounds  Respiratory effort / Chest Inspection: unlabored  Lungs:  normal air entry, lungs clear to auscultation and no rales, rhonchi or wheezing   Abdomen: flat, normal findings: bowel sounds normal and soft, non-tender  Lower Limbs:  no pitting edema  Pulses[de-identified]  RLE - DP: present 1-2+                 LLE - DP: present 1-2+  Musculoskeletal: ROM grossly normal      DATA:      ECG:                 Sinus rhythm with PVCs  ST-elevation in V2, V3  ST depression in V5  Bifascicular block      Telemetry: Normal sinus rhythm,    HR 60s Echocardiogram:         01/09/2018  LEFT VENTRICLE:  Systolic function was normal  Ejection fraction was estimated to be 60 %  Although no diagnostic regional wall motion abnormality was identified, this possibility cannot be completely excluded on the basis of this study  Doppler parameters were consistent with abnormal left ventricular relaxation (grade 1 diastolic dysfunction)      LEFT ATRIUM:  The atrium was mildly dilated      IVC, HEPATIC VEINS:  Respirophasic changes were blunted (less than 50% variation)      HISTORY: PRIOR HISTORY: Encephalopathy; Acute hypoxic respiratory failure; NSTEMI: Diabetes; Hypokalemia; Acute kidney injury     PROCEDURE: The procedure was performed at the bedside  This was a routine study  The transthoracic approach was used  The study included complete 2D imaging, M-mode, complete spectral Doppler, and color Doppler  The heart rate was 85 bpm,  at the start of the study  Images were obtained from the parasternal, apical, subcostal, and suprasternal notch acoustic windows  Echocardiographic views were limited due to lung interference and patient on mechanical ventilator  Image  quality was adequate      LEFT VENTRICLE: Size was normal  Systolic function was normal  Ejection fraction was estimated to be 60 %  Although no diagnostic regional wall motion abnormality was identified, this possibility cannot be completely excluded on the basis  of this study  Wall thickness was normal  No evidence of apical thrombus  DOPPLER: Doppler parameters were consistent with abnormal left ventricular relaxation (grade 1 diastolic dysfunction)      RIGHT VENTRICLE: The size was normal  Systolic function was normal  Wall thickness was normal      LEFT ATRIUM: The atrium was mildly dilated      RIGHT ATRIUM: Size was normal      MITRAL VALVE: Valve structure was normal  There was normal leaflet separation  DOPPLER: The transmitral velocity was within the normal range   There was no evidence for stenosis  There was no significant regurgitation      AORTIC VALVE: The valve was not well visualized  DOPPLER: Transaortic velocity was within the normal range  There was no evidence for stenosis  There was no significant regurgitation      TRICUSPID VALVE: The valve structure was normal  There was normal leaflet separation  DOPPLER: The transtricuspid velocity was within the normal range  There was no evidence for stenosis  There was no significant regurgitation      PULMONIC VALVE: Not well visualized  DOPPLER: The transpulmonic velocity was within the normal range  There was no significant regurgitation      PERICARDIUM: There was no pericardial effusion  The pericardium was normal in appearance      AORTA: The root exhibited normal size      SYSTEMIC VEINS: IVC: The inferior vena cava was normal in size  Respirophasic changes were blunted (less than 50% variation)      SYSTEM MEASUREMENT TABLES     2D  %FS: 29 1 %  Ao Diam: 3 4 cm  EDV(Teich): 110 1 ml  EF(Teich): 55 8 %  ESV(Teich): 48 7 ml  IVSd: 1 cm  LA Area: 20 cm2  LA Diam: 2 9 cm  LVEDV MOD A4C: 108 3 ml  LVEF MOD A4C: 49 9 %  LVESV MOD A4C: 54 2 ml  LVIDd: 4 8 cm  LVIDs: 3 4 cm  LVLd A4C: 7 4 cm  LVLs A4C: 5 9 cm  LVPWd: 1 cm  RA Area: 16 9 cm2  RVIDd: 3 9 cm  SV MOD A4C: 54 1 ml  SV(Teich): 61 5 ml     MM  TAPSE: 1 5 cm     PW  E': 0 1 m/s  E/E': 9 8  MV A Romaine: 0 9 m/s  MV Dec [place]: 2 2 m/s2  MV DecT: 234 9 ms  MV E Romaine: 0 5 m/s  MV E/A Ratio: 0 6  MV PHT: 68 1 ms  MVA By PHT: 3 2 cm2                Weights: Wt Readings from Last 3 Encounters:   02/17/19 75 4 kg (166 lb 3 6 oz)   02/02/18 77 1 kg (170 lb)   01/12/18 77 3 kg (170 lb 6 7 oz)   , Body mass index is 32 46 kg/m²           Lab Studies:    Results from last 7 days   Lab Units 02/17/19  2255 02/17/19  1958 02/17/19  1645   TROPONIN I ng/mL 34 82* 39 20* 38 91*        Results from last 7 days   Lab Units 02/18/19  0448   TRIGLYCERIDES mg/dL 91   HDL mg/dL 31*     Results from last 7 days Lab Units 02/18/19 0448 02/17/19 1715 02/17/19  1637   WBC Thousand/uL 12 42*  --  18 53*   HEMOGLOBIN g/dL 11 0*  --  12 7   I STAT HEMOGLOBIN g/dl  --  12 9  --    HEMATOCRIT % 34 1*  --  38 6   HEMATOCRIT, ISTAT %  --  38  --    PLATELETS Thousands/uL 316  --  344   ,   Results from last 7 days   Lab Units 02/18/19 0448 02/17/19 1715 02/17/19  1637   POTASSIUM mmol/L 2 9*  --  3 9   CHLORIDE mmol/L 101  --  95*   CO2 mmol/L 26  --  29   CO2, I-STAT mmol/L  --  27  --    BUN mg/dL 50*  --  58*   CREATININE mg/dL 0 80  --  0 93   CALCIUM mg/dL 7 9*  --  8 4   ALK PHOS U/L  --   --  72   ALT U/L  --   --  32   AST U/L  --   --  148*   GLUCOSE, ISTAT mg/dl  --  97  --

## 2019-02-18 NOTE — PLAN OF CARE
Problem: Potential for Falls  Goal: Patient will remain free of falls  Description  INTERVENTIONS:  - Assess patient frequently for physical needs  -  Identify cognitive and physical deficits and behaviors that affect risk of falls    -  [place] fall precautions as indicated by assessment   - Educate patient/family on patient safety including physical limitations  - Instruct patient to call for assistance with activity based on assessment  - Modify environment to reduce risk of injury  - Consider OT/PT consult to assist with strengthening/mobility  Outcome: Progressing     Problem: Prexisting or High Potential for Compromised Skin Integrity  Goal: Skin integrity is maintained or improved  Description  INTERVENTIONS:  - Identify patients at risk for skin breakdown  - Assess and monitor skin integrity  - Assess and monitor nutrition and hydration status  - Monitor labs (i e  albumin)  - Assess for incontinence   - Turn and reposition patient  - Assist with mobility/ambulation  - Relieve pressure over bony prominences  - Avoid friction and shearing  - Provide appropriate hygiene as needed including keeping skin clean and dry  - Evaluate need for skin moisturizer/barrier cream  - Collaborate with interdisciplinary team (i e  Nutrition, Rehabilitation, etc )   - Patient/family teaching  Outcome: Progressing     Problem: PAIN - ADULT  Goal: Verbalizes/displays adequate comfort level or baseline comfort level  Description  Interventions:  - Encourage patient to monitor pain and request assistance  - Assess pain using appropriate pain scale  - Administer analgesics based on type and severity of pain and evaluate response  - Implement non-pharmacological measures as appropriate and evaluate response  - Consider cultural and social influences on pain and pain management  - Notify physician/advanced practitioner if interventions unsuccessful or patient reports new pain  Outcome: Progressing     Problem: SAFETY ADULT  Goal: Maintain or return to baseline ADL function  Description  INTERVENTIONS:  -  Assess patient's ability to carry out ADLs; assess patient's baseline for ADL function and identify physical deficits which impact ability to perform ADLs (bathing, care of mouth/teeth, toileting, grooming, dressing, etc )  - Assess/evaluate cause of self-care deficits   - Assess range of motion  - Assess patient's mobility; develop plan if impaired  - Assess patient's need for assistive devices and provide as appropriate  - Encourage maximum independence but intervene and supervise when necessary  ¯ Involve family in performance of ADLs  ¯ Assess for home care needs following discharge   ¯ Request OT consult to assist with ADL evaluation and planning for discharge  ¯ Provide patient education as appropriate  Outcome: Progressing  Goal: Maintain or return mobility status to optimal level  Description  INTERVENTIONS:  - Assess patient's baseline mobility status (ambulation, transfers, stairs, etc )    - Identify cognitive and physical deficits and behaviors that affect mobility  - Identify mobility aids required to assist with transfers and/or ambulation (gait belt, sit-to-stand, lift, walker, cane, etc )  - [place] fall precautions as indicated by assessment  - Record patient progress and toleration of activity level on Mobility SBAR; progress patient to next Phase/Stage  - Instruct patient to call for assistance with activity based on assessment  - Request Rehabilitation consult to assist with strengthening/weightbearing, etc   Outcome: Progressing     Problem: DISCHARGE PLANNING  Goal: Discharge to home or other facility with appropriate resources  Description  INTERVENTIONS:  - Identify barriers to discharge w/patient and caregiver  - Arrange for needed discharge resources and transportation as appropriate  - Identify discharge learning needs (meds, wound care, etc )  - Arrange for interpretive services to assist at discharge as needed  - Refer to Case Management Department for coordinating discharge planning if the patient needs post-hospital services based on physician/advanced practitioner order or complex needs related to functional status, cognitive ability, or social support system  Outcome: Progressing     Problem: Knowledge Deficit  Goal: Patient/family/caregiver demonstrates understanding of disease process, treatment plan, medications, and discharge instructions  Description  Complete learning assessment and assess knowledge base  Interventions:  - Provide teaching at level of understanding  - Provide teaching via preferred learning methods  Outcome: Progressing     Problem: CARDIOVASCULAR - ADULT  Goal: Maintains optimal cardiac output and hemodynamic stability  Description  INTERVENTIONS:  - Monitor I/O, vital signs and rhythm  - Monitor for S/S and trends of decreased cardiac output i e  bleeding, hypotension  - Assess quality of pulses, skin color and temperature  - Assess for signs of decreased coronary artery perfusion - ex   Angina  - Instruct patient to report change in severity of symptoms   Outcome: Progressing  Goal: Absence of cardiac dysrhythmias or at baseline rhythm  Description  INTERVENTIONS:  - Continuous cardiac monitoring, monitor vital signs, obtain 12 lead EKG if indicated  - Administer antiarrhythmic and heart rate control medications as ordered  - Monitor electrolytes and administer replacement therapy as ordered  Outcome: Progressing     Problem: SKIN/TISSUE INTEGRITY - ADULT  Goal: Skin integrity remains intact  Description  INTERVENTIONS  - Identify patients at risk for skin breakdown  - Assess and monitor skin integrity  - Assess and monitor nutrition and hydration status  - Assess for incontinence   - Turn and reposition patient  - Assist with mobility/ambulation  - Relieve pressure over bony prominences  - Avoid friction and shearing  - Provide appropriate hygiene as needed including keeping skin clean and dry  - Evaluate need for skin moisturizer/barrier cream  - Collaborate with interdisciplinary team (i e  Nutrition, Rehabilitation, etc )   - Patient/family teaching   Outcome: Progressing  Goal: Incision(s), wounds(s) or drain site(s) healing without S/S of infection  Description  INTERVENTIONS  - Assess and document risk factors for skin impairment   - Assess and document dressing, incision, wound bed, drain sites and surrounding tissue  - Initiate Nutrition services consult and/or wound management as needed  Outcome: Progressing  Goal: Oral mucous membranes remain intact  Outcome: Progressing

## 2019-02-18 NOTE — PROGRESS NOTES
[facility]  Progress Note - Mark Seek 1940, [de-identified] y o  female MRN: [id number]    Unit/Bed#: E4 -01 Encounter: [id number]    Primary Care Provider: Amalia Dong MD   Date and time admitted to hospital: 2/17/2019  4:17 PM        * Acute ST elevation myocardial infarction (STEMI) [facility])  Assessment & Plan  · Ongoing for one week  · Patient is pain free with complaints of fatigue  · No aggressive treatment like cardiac catheterization desired  · DNR/DNI  · Cardiology recommends IV heparin for 48 hours, ASA, Plavix, no BB or ACE at this time due to low BP's  Will likely need furosemide as well  · Echo with EF at 28%, severely reduced from January 2018 EF 60%  · Will await further Cardiology recommendations  Memory impairment  Assessment & Plan  · Patient with significant short term memory difficulties  · Lives alone with the assistance of a neighbor who is a paid caregiver  · Will consult Case Management for safe discharge plan    Type 2 diabetes mellitus, with long-term current use of insulin [facility])  Assessment & Plan  Lab Results   Component Value Date    HGBA1C 11 2 (H) 01/18/2018       Recent Labs     02/17/19  2058 02/18/19  0746 02/18/19  1120   POCGLU 170* 93 184*       Blood Sugar Average: Last 72 hrs:  (P) 149   · Maintained on Metformin and basal insulin per home regimen  · Will continue basal insulin and add sliding scale  · Monitor FSBS ac / hs        VTE Pharmacologic Prophylaxis:   Pharmacologic: Heparin Drip  Mechanical VTE Prophylaxis in Place: Yes    Patient Centered Rounds: I have performed bedside rounds with nursing staff today  Discussions with Specialists or Other Care Team Provider: Provider notes reviewed    Education and Discussions with Family / Patient: Plan of care with patient  She denies any family  Time Spent for Care: 20 minutes  More than 50% of total time spent on counseling and coordination of care as described above      Current Length of Stay: 1 day(s)    Current Patient Status: Inpatient   Certification Statement: The patient will continue to require additional inpatient hospital stay due to STEMI / ischemic cardiomyopathy    Discharge Plan: Pending clinical improvement    Code Status: Level 3 - DNAR and DNI      Subjective:   Patient reports feeling nausea  She could not remember why she was here or if she had breakfast this morning  Denies pain  Objective:     Vitals:   Temp (24hrs), Av 3 °F (36 8 °C), Min:97 1 °F (36 2 °C), Max:99 °F (37 2 °C)    Temp:  [97 1 °F (36 2 °C)-99 °F (37 2 °C)] 98 2 °F (36 8 °C)  HR:  [] 83  Resp:  [16-29] 19  BP: ()/(55-78) 96/60  SpO2:  [90 %-95 %] 93 %  Body mass index is 32 46 kg/m²  Input and Output Summary (last 24 hours): Intake/Output Summary (Last 24 hours) at 2019 1350  Last data filed at 2019 2217  Gross per 24 hour   Intake 1032 4 ml   Output 200 ml   Net 832 4 ml       Physical Exam:     Physical Exam   Constitutional: She appears well-developed and well-nourished  No distress  Eyes: Conjunctivae are normal  No scleral icterus  Cardiovascular: Normal rate, regular rhythm and normal heart sounds  No murmur heard  Pulmonary/Chest: Effort normal and breath sounds normal  No respiratory distress  She has no wheezes  She has no rales  Abdominal: Soft  Bowel sounds are normal  She exhibits no distension  There is no tenderness  Musculoskeletal: Normal range of motion  She exhibits no edema or tenderness  Neurological: She is alert  Skin: Skin is warm and dry  She is not diaphoretic  Psychiatric: She has a normal mood and affect  Cognition and memory are impaired  She exhibits abnormal recent memory  Patient was unable to state the year but did know her age, location, where she lives  Could not say why she was here or what she had for breakfast a short time ago  She stated that her memory is "very bad"     Nursing note and vitals reviewed  Additional Data:     Labs:    Results from last 7 days   Lab Units 02/18/19  0448  02/17/19  1637   WBC Thousand/uL 12 42*  --  18 53*   HEMOGLOBIN g/dL 11 0*  --  12 7   I STAT HEMOGLOBIN   --    < >  --    HEMATOCRIT % 34 1*  --  38 6   HEMATOCRIT, ISTAT   --    < >  --    PLATELETS Thousands/uL 316  --  344   NEUTROS PCT %  --   --  81*   LYMPHS PCT %  --   --  10*   MONOS PCT %  --   --  7   EOS PCT %  --   --  1    < > = values in this interval not displayed  Results from last 7 days   Lab Units 02/18/19  0448  02/17/19  1637   SODIUM mmol/L 138  --  134*   POTASSIUM mmol/L 2 9*  --  3 9   CHLORIDE mmol/L 101  --  95*   CO2 mmol/L 26  --  29   CO2, I-STAT   --    < >  --    BUN mg/dL 50*  --  58*   CREATININE mg/dL 0 80  --  0 93   AGAP   --    < >  --    ANION GAP mmol/L 11  --  10   CALCIUM mg/dL 7 9*  --  8 4   ALBUMIN g/dL  --   --  2 4*   TOTAL BILIRUBIN mg/dL  --   --  1 11*   ALK PHOS U/L  --   --  72   ALT U/L  --   --  32   AST U/L  --   --  148*   GLUCOSE RANDOM mg/dL 93  --  98    < > = values in this interval not displayed  Results from last 7 days   Lab Units 02/17/19  1827   INR  1 17     Results from last 7 days   Lab Units 02/18/19  1120 02/18/19  0746 02/17/19  2058   POC GLUCOSE mg/dl 184* 93 170*                   * I Have Reviewed All Lab Data Listed Above  * Additional Pertinent Lab Tests Reviewed:  [facility] Admission Reviewed    Imaging:    Imaging Reports Reviewed Today Include: Echocardiogram  Imaging Personally Reviewed by Myself Includes:  None    Recent Cultures (last 7 days):     Results from last 7 days   Lab Units 02/17/19  1722   INFLUENZA B PCR  Not Detected   RSV PCR  Not Detected       Last 24 Hours Medication List:     Current Facility-Administered Medications:  acetaminophen 325 mg Oral Q8H PRN Niraj Prechtel, DO    aspirin 81 mg Oral Daily Niraj Prechtel, DO    atorvastatin 80 mg Oral QPM Rujul Hall, DO    citalopram 10 mg Oral Daily Niraj Prechtel, DO    clopidogrel 75 mg Oral Daily Niraj Prechtel, DO    famotidine 20 mg Oral BID Niraj Prechtel, DO    heparin (porcine) 3-20 Units/kg/hr (Order-Specific) Intravenous Titrated Antony Cornelius MD Last Rate: 14 Units/kg/hr (02/18/19 0146)   heparin (porcine) 2,000 Units Intravenous PRN Antony Cornelius MD    heparin (porcine) 4,000 Units Intravenous PRN Antony Cornelius MD    insulin glargine 12 Units Subcutaneous HS STORM Morris    insulin lispro 1-5 Units Subcutaneous HS Niraj Prechtel, DO    insulin lispro 1-6 Units Subcutaneous TID AC Niraj Prechtel, DO    nitroglycerin 0 4 mg Sublingual Q5 Min PRN Niraj Prechtel, DO    potassium chloride 40 mEq Oral BID Lori Sweeney MD         Today, Patient Was Seen By: STORM Morris    ** Please Note: Dictation voice to text software may have been used in the creation of this document   **

## 2019-02-18 NOTE — ED NOTES
Spoke with Dany Espinosa, pt friend, at pt's request made aware pt admitted to rm 450, states will be in to see pt tomorrow        Jeff Mcintyre RN  02/17/19 3369

## 2019-02-19 NOTE — ASSESSMENT & PLAN NOTE
· Ongoing for one week  · DNR / DNI status and initially did not want cardiac cath performed  · Cardiology recommends IV heparin for 48 hours, ASA, Plavix, no BB or ACE at this time due to low BP's  Will likely need furosemide as well  · Echo with EF at 28%, severely reduced from 2018 EF 60%  · Today, patient with active chest pain which was partially relieved with NTG but dropped SBP to 80  Repeat EKG performed with ? Increasing ST elevation  Patient is now stating that she would undergo cardiac cath  Discussed with Cardiology who will schedule the patient for cath  Placed on oxygen, MS and small IV fluid bolus ordered  Phone call placed and message left to emergency contact on facesheet that is listed as her "sister" although patient states all of her siblings are  and she has no family  She has a neighbor who is a paid caregiver

## 2019-02-19 NOTE — PLAN OF CARE
Problem: Potential for Falls  Goal: Patient will remain free of falls  Description  INTERVENTIONS:  - Assess patient frequently for physical needs  -  Identify cognitive and physical deficits and behaviors that affect risk of falls    -  [place] fall precautions as indicated by assessment   - Educate patient/family on patient safety including physical limitations  - Instruct patient to call for assistance with activity based on assessment  - Modify environment to reduce risk of injury  - Consider OT/PT consult to assist with strengthening/mobility  Outcome: Progressing     Problem: Prexisting or High Potential for Compromised Skin Integrity  Goal: Skin integrity is maintained or improved  Description  INTERVENTIONS:  - Identify patients at risk for skin breakdown  - Assess and monitor skin integrity  - Assess and monitor nutrition and hydration status  - Monitor labs (i e  albumin)  - Assess for incontinence   - Turn and reposition patient  - Assist with mobility/ambulation  - Relieve pressure over bony prominences  - Avoid friction and shearing  - Provide appropriate hygiene as needed including keeping skin clean and dry  - Evaluate need for skin moisturizer/barrier cream  - Collaborate with interdisciplinary team (i e  Nutrition, Rehabilitation, etc )   - Patient/family teaching  Outcome: Progressing     Problem: PAIN - ADULT  Goal: Verbalizes/displays adequate comfort level or baseline comfort level  Description  Interventions:  - Encourage patient to monitor pain and request assistance  - Assess pain using appropriate pain scale  - Administer analgesics based on type and severity of pain and evaluate response  - Implement non-pharmacological measures as appropriate and evaluate response  - Consider cultural and social influences on pain and pain management  - Notify physician/advanced practitioner if interventions unsuccessful or patient reports new pain  Outcome: Progressing     Problem: SAFETY ADULT  Goal: Maintain or return to baseline ADL function  Description  INTERVENTIONS:  -  Assess patient's ability to carry out ADLs; assess patient's baseline for ADL function and identify physical deficits which impact ability to perform ADLs (bathing, care of mouth/teeth, toileting, grooming, dressing, etc )  - Assess/evaluate cause of self-care deficits   - Assess range of motion  - Assess patient's mobility; develop plan if impaired  - Assess patient's need for assistive devices and provide as appropriate  - Encourage maximum independence but intervene and supervise when necessary  ¯ Involve family in performance of ADLs  ¯ Assess for home care needs following discharge   ¯ Request OT consult to assist with ADL evaluation and planning for discharge  ¯ Provide patient education as appropriate  Outcome: Progressing  Goal: Maintain or return mobility status to optimal level  Description  INTERVENTIONS:  - Assess patient's baseline mobility status (ambulation, transfers, stairs, etc )    - Identify cognitive and physical deficits and behaviors that affect mobility  - Identify mobility aids required to assist with transfers and/or ambulation (gait belt, sit-to-stand, lift, walker, cane, etc )  - [place] fall precautions as indicated by assessment  - Record patient progress and toleration of activity level on Mobility SBAR; progress patient to next Phase/Stage  - Instruct patient to call for assistance with activity based on assessment  - Request Rehabilitation consult to assist with strengthening/weightbearing, etc   Outcome: Progressing     Problem: DISCHARGE PLANNING  Goal: Discharge to home or other facility with appropriate resources  Description  INTERVENTIONS:  - Identify barriers to discharge w/patient and caregiver  - Arrange for needed discharge resources and transportation as appropriate  - Identify discharge learning needs (meds, wound care, etc )  - Arrange for interpretive services to assist at discharge as needed  - Refer to Case Management Department for coordinating discharge planning if the patient needs post-hospital services based on physician/advanced practitioner order or complex needs related to functional status, cognitive ability, or social support system  Outcome: Progressing     Problem: Knowledge Deficit  Goal: Patient/family/caregiver demonstrates understanding of disease process, treatment plan, medications, and discharge instructions  Description  Complete learning assessment and assess knowledge base  Interventions:  - Provide teaching at level of understanding  - Provide teaching via preferred learning methods  Outcome: Progressing     Problem: CARDIOVASCULAR - ADULT  Goal: Maintains optimal cardiac output and hemodynamic stability  Description  INTERVENTIONS:  - Monitor I/O, vital signs and rhythm  - Monitor for S/S and trends of decreased cardiac output i e  bleeding, hypotension  - Assess quality of pulses, skin color and temperature  - Assess for signs of decreased coronary artery perfusion - ex   Angina  - Instruct patient to report change in severity of symptoms   Outcome: Progressing  Goal: Absence of cardiac dysrhythmias or at baseline rhythm  Description  INTERVENTIONS:  - Continuous cardiac monitoring, monitor vital signs, obtain 12 lead EKG if indicated  - Administer antiarrhythmic and heart rate control medications as ordered  - Monitor electrolytes and administer replacement therapy as ordered  Outcome: Progressing     Problem: SKIN/TISSUE INTEGRITY - ADULT  Goal: Skin integrity remains intact  Description  INTERVENTIONS  - Identify patients at risk for skin breakdown  - Assess and monitor skin integrity  - Assess and monitor nutrition and hydration status  - Assess for incontinence   - Turn and reposition patient  - Assist with mobility/ambulation  - Relieve pressure over bony prominences  - Avoid friction and shearing  - Provide appropriate hygiene as needed including keeping skin clean and dry  - Evaluate need for skin moisturizer/barrier cream  - Collaborate with interdisciplinary team (i e  Nutrition, Rehabilitation, etc )   - Patient/family teaching   Outcome: Progressing  Goal: Incision(s), wounds(s) or drain site(s) healing without S/S of infection  Description  INTERVENTIONS  - Assess and document risk factors for skin impairment   - Assess and document dressing, incision, wound bed, drain sites and surrounding tissue  - Initiate Nutrition services consult and/or wound management as needed  Outcome: Progressing  Goal: Oral mucous membranes remain intact  Outcome: Progressing

## 2019-02-19 NOTE — ASSESSMENT & PLAN NOTE
Lives alone; never  or had any children    Has assistance of a neighbor who is a paid caregiver Catalina Melara ([phone]), states that she is the POA

## 2019-02-19 NOTE — PROGRESS NOTES
Patient seen and examined  She is 2 days post acute anterior MI  Echo disclosed severely depress LV function with extensive anteroapical septal akinesis  Marked troponin elevation  Today she had recurrent pain  On exam, she has a prominent pericardial friction rub, consistent with post-transmural infarction pericarditis  She is not currently in heart failure on exam, although she is at risk for this complication and has a markedly elevated NT-proBNP  ECG has evolved Q waves across the entire precordium and persistent ST elevation, indicative of evolving aneurysm  There is no benefit to be gained by reperfusion at this point  There would be risk associated with the procedure, while any likelihood of benefit passed more then 24-36 hours ago  She is not a surgical candidate  She is DNR/DNI, per her wishes  Recommend medical therapy and comfort care, with relief of anxiety and pain a high priority

## 2019-02-19 NOTE — PLAN OF CARE
Problem: Potential for Falls  Goal: Patient will remain free of falls  Description  INTERVENTIONS:  - Assess patient frequently for physical needs  -  Identify cognitive and physical deficits and behaviors that affect risk of falls    -  [place] fall precautions as indicated by assessment   - Educate patient/family on patient safety including physical limitations  - Instruct patient to call for assistance with activity based on assessment  - Modify environment to reduce risk of injury  - Consider OT/PT consult to assist with strengthening/mobility  Outcome: Progressing     Problem: Prexisting or High Potential for Compromised Skin Integrity  Goal: Skin integrity is maintained or improved  Description  INTERVENTIONS:  - Identify patients at risk for skin breakdown  - Assess and monitor skin integrity  - Assess and monitor nutrition and hydration status  - Monitor labs (i e  albumin)  - Assess for incontinence   - Turn and reposition patient  - Assist with mobility/ambulation  - Relieve pressure over bony prominences  - Avoid friction and shearing  - Provide appropriate hygiene as needed including keeping skin clean and dry  - Evaluate need for skin moisturizer/barrier cream  - Collaborate with interdisciplinary team (i e  Nutrition, Rehabilitation, etc )   - Patient/family teaching  Outcome: Progressing     Problem: PAIN - ADULT  Goal: Verbalizes/displays adequate comfort level or baseline comfort level  Description  Interventions:  - Encourage patient to monitor pain and request assistance  - Assess pain using appropriate pain scale  - Administer analgesics based on type and severity of pain and evaluate response  - Implement non-pharmacological measures as appropriate and evaluate response  - Consider cultural and social influences on pain and pain management  - Notify physician/advanced practitioner if interventions unsuccessful or patient reports new pain  Outcome: Progressing     Problem: SAFETY ADULT  Goal: Maintain or return to baseline ADL function  Description  INTERVENTIONS:  -  Assess patient's ability to carry out ADLs; assess patient's baseline for ADL function and identify physical deficits which impact ability to perform ADLs (bathing, care of mouth/teeth, toileting, grooming, dressing, etc )  - Assess/evaluate cause of self-care deficits   - Assess range of motion  - Assess patient's mobility; develop plan if impaired  - Assess patient's need for assistive devices and provide as appropriate  - Encourage maximum independence but intervene and supervise when necessary  ¯ Involve family in performance of ADLs  ¯ Assess for home care needs following discharge   ¯ Request OT consult to assist with ADL evaluation and planning for discharge  ¯ Provide patient education as appropriate  Outcome: Progressing  Goal: Maintain or return mobility status to optimal level  Description  INTERVENTIONS:  - Assess patient's baseline mobility status (ambulation, transfers, stairs, etc )    - Identify cognitive and physical deficits and behaviors that affect mobility  - Identify mobility aids required to assist with transfers and/or ambulation (gait belt, sit-to-stand, lift, walker, cane, etc )  - [place] fall precautions as indicated by assessment  - Record patient progress and toleration of activity level on Mobility SBAR; progress patient to next Phase/Stage  - Instruct patient to call for assistance with activity based on assessment  - Request Rehabilitation consult to assist with strengthening/weightbearing, etc   Outcome: Progressing     Problem: DISCHARGE PLANNING  Goal: Discharge to home or other facility with appropriate resources  Description  INTERVENTIONS:  - Identify barriers to discharge w/patient and caregiver  - Arrange for needed discharge resources and transportation as appropriate  - Identify discharge learning needs (meds, wound care, etc )  - Arrange for interpretive services to assist at discharge as needed  - Refer to Case Management Department for coordinating discharge planning if the patient needs post-hospital services based on physician/advanced practitioner order or complex needs related to functional status, cognitive ability, or social support system  Outcome: Progressing     Problem: Knowledge Deficit  Goal: Patient/family/caregiver demonstrates understanding of disease process, treatment plan, medications, and discharge instructions  Description  Complete learning assessment and assess knowledge base  Interventions:  - Provide teaching at level of understanding  - Provide teaching via preferred learning methods  Outcome: Progressing     Problem: CARDIOVASCULAR - ADULT  Goal: Maintains optimal cardiac output and hemodynamic stability  Description  INTERVENTIONS:  - Monitor I/O, vital signs and rhythm  - Monitor for S/S and trends of decreased cardiac output i e  bleeding, hypotension  - Assess quality of pulses, skin color and temperature  - Assess for signs of decreased coronary artery perfusion - ex   Angina  - Instruct patient to report change in severity of symptoms   Outcome: Progressing  Goal: Absence of cardiac dysrhythmias or at baseline rhythm  Description  INTERVENTIONS:  - Continuous cardiac monitoring, monitor vital signs, obtain 12 lead EKG if indicated  - Administer antiarrhythmic and heart rate control medications as ordered  - Monitor electrolytes and administer replacement therapy as ordered  Outcome: Progressing     Problem: SKIN/TISSUE INTEGRITY - ADULT  Goal: Skin integrity remains intact  Description  INTERVENTIONS  - Identify patients at risk for skin breakdown  - Assess and monitor skin integrity  - Assess and monitor nutrition and hydration status  - Assess for incontinence   - Turn and reposition patient  - Assist with mobility/ambulation  - Relieve pressure over bony prominences  - Avoid friction and shearing  - Provide appropriate hygiene as needed including keeping skin clean and dry  - Evaluate need for skin moisturizer/barrier cream  - Collaborate with interdisciplinary team (i e  Nutrition, Rehabilitation, etc )   - Patient/family teaching   Outcome: Progressing  Goal: Incision(s), wounds(s) or drain site(s) healing without S/S of infection  Description  INTERVENTIONS  - Assess and document risk factors for skin impairment   - Assess and document dressing, incision, wound bed, drain sites and surrounding tissue  - Initiate Nutrition services consult and/or wound management as needed  Outcome: Progressing  Goal: Oral mucous membranes remain intact  Outcome: Progressing     Problem: INFECTION - ADULT  Goal: Absence or prevention of progression during hospitalization  Description  INTERVENTIONS:  - Assess and monitor for signs and symptoms of infection  - Monitor lab/diagnostic results  - Monitor all insertion sites, i e  indwelling lines, tubes, and drains  - Monitor endotracheal (as able) and nasal secretions for changes in amount and color  - [place] appropriate cooling/warming therapies per order  - Administer medications as ordered  - Instruct and encourage patient and family to use good hand hygiene technique  - Identify and instruct in appropriate isolation precautions for identified infection/condition  Outcome: Progressing  Goal: Absence of fever/infection during neutropenic period  Description  INTERVENTIONS:  - Monitor WBC  - Implement neutropenic guidelines  Outcome: Progressing     Problem: NEUROSENSORY - ADULT  Goal: Achieves stable or improved neurological status  Description  INTERVENTIONS  - Monitor and report changes in neurological status  - Initiate measures to prevent increased intracranial pressure  - Maintain blood pressure and fluid volume within ordered parameters to optimize cerebral perfusion  - Monitor temperature, glucose, and sodium or any other associated labs   Initiate appropriate interventions as ordered  - Monitor for seizure activity   - Administer anti-seizure medications as ordered  Outcome: Progressing  Goal: Absence of seizures  Description  INTERVENTIONS  - Monitor for seizure activity  - Administer anti-seizure medications as ordered  - Monitor neurological status  Outcome: Progressing  Goal: Remains free of injury related to seizures activity  Description  INTERVENTIONS  - Maintain airway, patient safety  and administer oxygen as ordered  - Monitor patient for seizure activity, document and report duration and description of seizure to physician/advanced practitioner  - If seizure occurs,  ensure patient safety during seizure  - Reorient patient post seizure  - Seizure pads on all 4 side rails  - Instruct patient/family to notify RN of any seizure activity including if an aura is experienced  - Instruct patient/family to call for assistance with activity based on nursing assessment  - Administer anti-seizure medications as ordered  - Monitor fetal well being  Outcome: Progressing  Goal: Achieves maximal functionality and self care  Description  INTERVENTIONS  - Monitor swallowing and airway patency with patient fatigue and changes in neurological status  - Encourage and assist patient to increase activity and self care with guidance from rehab services  - Encourage visually impaired, hearing impaired and aphasic patients to use assistive/communication devices  Outcome: Progressing     Problem: RESPIRATORY - ADULT  Goal: Achieves optimal ventilation and oxygenation  Description  INTERVENTIONS:  - Assess for changes in respiratory status  - Assess for changes in mentation and behavior  - Position to facilitate oxygenation and minimize respiratory effort  - Oxygen administration by appropriate delivery method based on oxygen saturation (per order) or ABGs  - Initiate smoking cessation education as indicated  - Encourage broncho-pulmonary hygiene including cough, deep breathe, Incentive Spirometry  - Assess the need for suctioning and aspirate as needed  - Assess and instruct to report SOB or any respiratory difficulty  - Respiratory Therapy support as indicated  Outcome: Progressing     Problem: GENITOURINARY - ADULT  Goal: Maintains or returns to baseline urinary function  Description  INTERVENTIONS:  - Assess urinary function  - Encourage oral fluids to ensure adequate hydration  - Administer IV fluids as ordered to ensure adequate hydration  - Administer ordered medications as needed  - Offer frequent toileting  - Follow urinary retention protocol if ordered  Outcome: Progressing  Goal: Absence of urinary retention  Description  INTERVENTIONS:  - Assess patient?s ability to void and empty bladder  - Monitor I/O  - Bladder scan as needed  - Discuss with physician/AP medications to alleviate retention as needed  - Discuss catheterization for long term situations as appropriate  Outcome: Progressing  Goal: Urinary catheter remains patent  Description  INTERVENTIONS:  - Assess patency of urinary catheter  - If patient has a chronic vang, consider changing catheter if non-functioning  - Follow guidelines for intermittent irrigation of non-functioning urinary catheter  Outcome: Progressing

## 2019-02-19 NOTE — ASSESSMENT & PLAN NOTE
Lab Results   Component Value Date    HGBA1C 11 2 (H) 01/18/2018       Recent Labs     02/18/19  1120 02/18/19  1536 02/18/19 2053 02/19/19  0705   POCGLU 184* 209* 179* 124       Blood Sugar Average: Last 72 hrs:  (P) 478 [id number]   · Maintained on Metformin and basal insulin per home regimen  · Will continue basal insulin and add sliding scale  · Monitor FSBS ac / hs

## 2019-02-19 NOTE — PROGRESS NOTES
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

## 2019-02-19 NOTE — PROGRESS NOTES
[facility]  Progress Note - Nicolas Sheets 1940, [de-identified] y o  female MRN: [id number]    Unit/Bed#: E4 -01 Encounter: [id number]    Primary Care Provider: Irvin Nicole MD   Date and time admitted to hospital: 2019  4:17 PM        * Acute ST elevation myocardial infarction (STEMI) [facility])  Assessment & Plan  · Ongoing for one week  · DNR / DNI status and initially did not want cardiac cath performed  · Cardiology recommends IV heparin for 48 hours, ASA, Plavix, no BB or ACE at this time due to low BP's  Will likely need furosemide as well  · Echo with EF at 28%, severely reduced from 2018 EF 60%  · Today, patient with active chest pain which was partially relieved with NTG but dropped SBP to 80  Repeat EKG performed with ? Increasing ST elevation  Patient is now stating that she would undergo cardiac cath  Discussed with Cardiology who will schedule the patient for cath  Placed on oxygen, MS and small IV fluid bolus ordered  Phone call placed and message left to emergency contact on facesheet that is listed as her "sister" although patient states all of her siblings are  and she has no family  She has a neighbor who is a paid caregiver          Memory impairment  Assessment & Plan  · Patient with significant short term memory difficulties  · Lives alone with the assistance of a neighbor who is a paid caregiver  · Will consult Case Management for safe discharge plan    Type 2 diabetes mellitus, with long-term current use of insulin [facility])  Assessment & Plan  Lab Results   Component Value Date    HGBA1C 11 2 (H) 2018       Recent Labs     19  1120 19  1536 19  2053 19  0705   POCGLU 184* 209* 179* 124       Blood Sugar Average: Last 72 hrs:  (P) 216 [id number]   · Maintained on Metformin and basal insulin per home regimen  · Will continue basal insulin and add sliding scale  · Monitor FSBS ac / hs        VTE Pharmacologic Prophylaxis:   Pharmacologic: Heparin Drip  Mechanical VTE Prophylaxis in Place: Yes    Patient Centered Rounds: I have performed bedside rounds with nursing staff today  Discussions with Specialists or Other Care Team Provider: Discussed plan of care with Cardiology / Attending  Education and Discussions with Family / Patient: Plan of care with patient  Left message for emergency contact, Jasper Hoyt  Awaiting call back  Time Spent for Care: 30 minutes  More than 50% of total time spent on counseling and coordination of care as described above  Current Length of Stay: 2 day(s)    Current Patient Status: Inpatient   Certification Statement: The patient will continue to require additional inpatient hospital stay due to Acute coronary syndrome    Discharge Plan: Pending     Code Status: Level 3 - DNAR and DNI      Subjective:   Patient with active chest pain and arm pain  Also has complaints of abdominal pain  Denies nausea  Denies shortness of breath  Objective:     Vitals:   Temp (24hrs), Av 9 °F (36 6 °C), Min:97 5 °F (36 4 °C), Max:98 3 °F (36 8 °C)    Temp:  [97 5 °F (36 4 °C)-98 3 °F (36 8 °C)] 98 3 °F (36 8 °C)  HR:  [] 88  Resp:  [17-20] 20  BP: ()/(52-93) 92/62  SpO2:  [92 %-95 %] 95 %  Body mass index is 32 46 kg/m²  Input and Output Summary (last 24 hours): Intake/Output Summary (Last 24 hours) at 2019 0915  Last data filed at 2019  Gross per 24 hour   Intake 649 38 ml   Output 150 ml   Net 499 38 ml       Physical Exam:     Physical Exam   Constitutional: She is oriented to person, place, and time  She appears well-developed and well-nourished  No distress  Eyes: Conjunctivae are normal  No scleral icterus  Cardiovascular: Normal rate, regular rhythm and normal heart sounds  No murmur heard  Pulmonary/Chest: Effort normal and breath sounds normal  Tachypnea (shallow respirations) noted  She has no wheezes  She has no rales  Abdominal: Soft  Bowel sounds are normal  She exhibits no distension  There is generalized tenderness  Musculoskeletal: Normal range of motion  She exhibits no edema or tenderness  Neurological: She is alert and oriented to person, place, and time  Skin: Skin is warm and dry  She is not diaphoretic  Psychiatric: Her mood appears anxious  Cognition and memory are impaired  She exhibits abnormal recent memory  Nursing note and vitals reviewed  Additional Data:     Labs:    Results from last 7 days   Lab Units 02/18/19  0448  02/17/19  1637   WBC Thousand/uL 12 42*  --  18 53*   HEMOGLOBIN g/dL 11 0*  --  12 7   I STAT HEMOGLOBIN   --    < >  --    HEMATOCRIT % 34 1*  --  38 6   HEMATOCRIT, ISTAT   --    < >  --    PLATELETS Thousands/uL 316  --  344   NEUTROS PCT %  --   --  81*   LYMPHS PCT %  --   --  10*   MONOS PCT %  --   --  7   EOS PCT %  --   --  1    < > = values in this interval not displayed  Results from last 7 days   Lab Units 02/19/19  0614  02/17/19  1637   SODIUM mmol/L 135*   < > 134*   POTASSIUM mmol/L 3 5   < > 3 9   CHLORIDE mmol/L 102   < > 95*   CO2 mmol/L 24   < > 29   CO2, I-STAT   --    < >  --    BUN mg/dL 32*   < > 58*   CREATININE mg/dL 0 68   < > 0 93   AGAP   --    < >  --    ANION GAP mmol/L 9   < > 10   CALCIUM mg/dL 8 2*   < > 8 4   ALBUMIN g/dL  --   --  2 4*   TOTAL BILIRUBIN mg/dL  --   --  1 11*   ALK PHOS U/L  --   --  72   ALT U/L  --   --  32   AST U/L  --   --  148*   GLUCOSE RANDOM mg/dL 116   < > 98    < > = values in this interval not displayed  Results from last 7 days   Lab Units 02/17/19  1827   INR  1 17     Results from last 7 days   Lab Units 02/19/19  0705 02/18/19  2053 02/18/19  1536 02/18/19  1120 02/18/19  0746 02/17/19  2058   POC GLUCOSE mg/dl 124 179* 209* 184* 93 170*                   * I Have Reviewed All Lab Data Listed Above  * Additional Pertinent Lab Tests Reviewed:  [facility] Admission Reviewed    Imaging:    Imaging Reports Reviewed Today Include: None   Imaging Personally Reviewed by Myself Includes:  None    Recent Cultures (last 7 days):     Results from last 7 days   Lab Units 02/17/19  1722   INFLUENZA B PCR  Not Detected   RSV PCR  Not Detected       Last 24 Hours Medication List:     Current Facility-Administered Medications:  acetaminophen 325 mg Oral Q8H PRN Niraj Prechtel, DO    aspirin 81 mg Oral Daily Niraj Prechtel, DO    atorvastatin 80 mg Oral QPM Rujul Hall, DO    citalopram 10 mg Oral Daily Niraj Prechtel, DO    clopidogrel 75 mg Oral Daily Niraj Prechtel, DO    famotidine 20 mg Oral BID Niraj Prechtel, DO    haloperidol lactate 1 mg Intramuscular Q6H PRN STORM Clay    heparin (porcine) 3-20 Units/kg/hr (Order-Specific) Intravenous Titrated Alexa Mullins MD Last Rate: 18 Units/kg/hr (02/19/19 0707)   heparin (porcine) 2,000 Units Intravenous PRN Alexa Mullins MD    heparin (porcine) 4,000 Units Intravenous PRN Alexa Mullins MD    insulin glargine 12 Units Subcutaneous HS Paul Solid, CRNP    insulin lispro 1-5 Units Subcutaneous HS Niraj Prechtel, DO    insulin lispro 1-6 Units Subcutaneous TID AC Niraj Prechtel, DO    melatonin 6 mg Oral HS STORM Clay    morphine injection 1 mg Intravenous Q4H PRN STORM Gu    nitroglycerin 0 4 mg Sublingual Q5 Min PRN Niraj Prechtel, DO    sodium chloride 100 mL Intravenous Once STORM Gu         Today, Patient Was Seen By: STORM Gu    ** Please Note: Dictation voice to text software may have been used in the creation of this document   **

## 2019-02-20 PROBLEM — I95.9 HYPOTENSION: Status: ACTIVE | Noted: 2019-01-01

## 2019-02-20 PROBLEM — I50.41 ACUTE COMBINED SYSTOLIC (CONGESTIVE) AND DIASTOLIC (CONGESTIVE) HEART FAILURE (HCC): Status: ACTIVE | Noted: 2019-01-01

## 2019-02-20 PROBLEM — F41.9 ANXIETY: Status: ACTIVE | Noted: 2019-01-01

## 2019-02-20 PROBLEM — N39.0 UTI (URINARY TRACT INFECTION): Status: ACTIVE | Noted: 2019-01-01

## 2019-02-20 PROBLEM — I31.9 PERICARDITIS: Status: ACTIVE | Noted: 2019-01-01

## 2019-02-20 PROBLEM — D72.829 LEUKOCYTOSIS: Status: ACTIVE | Noted: 2019-01-01

## 2019-02-20 NOTE — PROGRESS NOTES
Progress Note - Radha Smith [de-identified] y o  female MRN: [id number]    Unit/Bed#: E4 -01 Encounter: [id number]      Assessment/Plan:  1-acute ST elevation anterior wall myocardial infarction:  Patient presented with chest pain, EKG changes, and positive troponin at 39  She has had stuttering intermittent chest pain since that time  She is being followed by the cardiology service  -patient was evaluated for cardiac catheterization yesterday:  She had initially refused cardiac catheterization  Per the cardiology service, diagnosed with post-transmural infarction pericarditis:   -was noted that her EKG had the vault Q-waves across the entire precordium persistent ST elevation, indicative of involving aneurysm  It was felt that there would be no benefit to be gained by reperfusion  She was not felt to be a surgical candidate  Medical therapy was recommended   -continue aspirin 324 mg daily, Lipitor 80 mg daily, Plavix 75 mg daily  Patient not currently on a beta-blocker, or ACE-inhibitor, or standing nitrates due to hypotension  Currently on IV heparin infusion status post 48 hr:  Will confer with Cardiology requiring discontinuation    2-acute systolic/diastolic CHF:  Patient's 2D echocardiogram reveals an ejection fraction of 28%  She has akinesis of the basal mid anterior septal and apical walls  She has severe hypokinesis of the entire anterior and mid inferior septal walls  She has grade 1 diastolic dysfunction  -patient's acute systolic/diastolic congestive heart failure is likely secondary to her acute myocardial infarction    -blood pressure currently not tolerate the addition of an ACE-inhibitor  Currently euvolemic and not requiring diuretics  Monitor closely  3-diabetes type 2:  With long-term use of insulin, without known complication:  Continue Lantus, sliding scale insulin, and Accu-Cheks  At home patient's Lantus doses 14 units daily, and metformin 500 mg b i d  Enzo Lynn    -prior A1c was 6 4   -blood sugars today were 126    4-hypotension:  Patient has a previous history of hypertension, per previous records with her primary care provider  However she was not on any medications for this prior to admission  I reviewed her previous 2 notes from her primary care provider in [id number] and systolic blood pressure baseline appears to range 120-130s      -current hypotension, likely secondary to acute ST-elevation myocardial infarction, an element of cardiogenic shock, and acute systolic congestive heart failure    -sbp 95/ today thurs    5-possible dementia:  Patient's primary care office note from 10/2018 notes a history of memory loss that has been stable  6-anxiety:  Patient on Celexa at home  7-hypokalemia:  Repleted and normalized  Normal magnesium noted  8-UTI:  Patient complains of urinary discomfort, urinary frequency, and foul odor, which she states is new since admission  Urinalysis is concerning for acute UTI  Will start antibiotics  Await urine culture  9-leukocytosis:  Patient's initial will blood cell count was 18  However this has improved to 11 9 without antibiotic intervention to this point  Her leukocytosis is likely secondary to stress response from her acute myocardial infarction  Patient also has a UTI and will be started on antibiotics    10-post myocardial infarction pericarditis: pt has an audible rub  Was diagnosed with post-infarction pericarditis  -no significant effusion on echo   -avoid anti-inflammatory meds 7-10 days  No glucorticoids    -tylenol prn   -usually spontaneously resolved after few days    11-family: called [phone], friend    She has POA paperwork       VTE Pharmacologic Prophylaxis: Heparin  VTE Mechanical Prophylaxis: sequential compression device    Certification Statement: The patient will continue to require additional inpatient hospital stay due to need for further acute intervention for MI    Status: inpatient     D/w cardiology team  D/w pt's nurse at bedside rounds    Total time spent today including interview, exam, discussion with consultants, review of chart:  45 min  ===================================================================    Subjective:  Patient notes she feels better  She denies any current chest pain or chest discomfort at this time  She did have chest discomfort earlier today that resolved after 1 mg IV morphine  Patient also had 3 episodes of chest pain yesterday requiring both nitroglycerin and morphine at times  Patient notes however she feels better now  She denies any current chest pain, chest pressure, chest tightness  She denies any shortness of breath or cough  She denies any nausea, vomiting, diarrhea  She is tolerating p o  She notes she has an excellent appetite  She denies any dizziness or lightheadedness  Patient complains of difficulty urinating  She notes she is having profuse urination which is quite different from her baseline  She notes discomfort with urinating as well as a foul smell in her urine which is new  She is concerned about a urinary tract infection  Physical Exam:   Temp:  [96 8 °F (36 °C)-98 2 °F (36 8 °C)] 96 8 °F (36 °C)  HR:  [] 88  Resp:  [19-20] 20  BP: ()/(53-72) 95/63    Gen:  Pleasant, non-tachypnic, non-dyspnic  Conversant  Sitting up at the bedside  Heart: regular rate and rhythm, S1S2 present, no murmur   +rub  no gallop  Lungs: clear to ausculatation bilaterally  No wheezing, crackles, or rhonchi  No accessory muscle use or respiratory distress  Good air movement  Abd: soft, non-tender, non-distended  NABS, no guarding, rebound or peritoneal signs  Extremities: no clubbing, cyanosis or edema  2+pedal pulses bilaterally  Full range of motion  Neuro: awake, alert  Fluent speech  Moving all her extremities  Skin: warm and dry: no petechiae, purpura and rash      LABS:   Results from last 7 days   Lab Units 02/19/19  1321 02/18/19 0448 02/17/19 1715 02/17/19  1637   WBC Thousand/uL 11 96* 12 42*  --  18 53*   HEMOGLOBIN g/dL 11 5 11 0*  --  12 7   I STAT HEMOGLOBIN g/dl  --   --  12 9  --    HEMATOCRIT % 34 9 34 1*  --  38 6   HEMATOCRIT, ISTAT %  --   --  38  --    PLATELETS Thousands/uL 349 316  --  344     Results from last 7 days   Lab Units 02/19/19  0614 02/18/19 0448 02/17/19 1715 02/17/19  1637   POTASSIUM mmol/L 3 5 2 9*  --  3 9   CHLORIDE mmol/L 102 101  --  95*   CO2 mmol/L 24 26  --  29   CO2, I-STAT mmol/L  --   --  27  --    BUN mg/dL 32* 50*  --  58*   CREATININE mg/dL 0 68 0 80  --  0 93   GLUCOSE, ISTAT mg/dl  --   --  97  --    CALCIUM mg/dL 8 2* 7 9*  --  8 4       Hospital Data:  2/18:  Chest x-ray:  No acute disease    2/17:  Influenza PCR:  Negative    2/18:  Urinalysis:  Negative nitrate, large leukocyte esterase, WBCs 10-20  Urine culture:  Pending  ---------------------------------------------------------------------------------------------------------------  This note has been constructed using a voice recognition system

## 2019-02-20 NOTE — NURSING NOTE
Pt reported intense, 7/10 chest pain under her L breast that worsened w/ movement and is tender to palpation  Dr Santa Marroquin and Stephani Frazier made aware  EKG done  Nitroglycerin given per orders

## 2019-02-20 NOTE — PLAN OF CARE
Problem: Potential for Falls  Goal: Patient will remain free of falls  Description  INTERVENTIONS:  - Assess patient frequently for physical needs  -  Identify cognitive and physical deficits and behaviors that affect risk of falls    -  [place] fall precautions as indicated by assessment   - Educate patient/family on patient safety including physical limitations  - Instruct patient to call for assistance with activity based on assessment  - Modify environment to reduce risk of injury  - Consider OT/PT consult to assist with strengthening/mobility  Outcome: Progressing     Problem: Prexisting or High Potential for Compromised Skin Integrity  Goal: Skin integrity is maintained or improved  Description  INTERVENTIONS:  - Identify patients at risk for skin breakdown  - Assess and monitor skin integrity  - Assess and monitor nutrition and hydration status  - Monitor labs (i e  albumin)  - Assess for incontinence   - Turn and reposition patient  - Assist with mobility/ambulation  - Relieve pressure over bony prominences  - Avoid friction and shearing  - Provide appropriate hygiene as needed including keeping skin clean and dry  - Evaluate need for skin moisturizer/barrier cream  - Collaborate with interdisciplinary team (i e  Nutrition, Rehabilitation, etc )   - Patient/family teaching  Outcome: Progressing     Problem: PAIN - ADULT  Goal: Verbalizes/displays adequate comfort level or baseline comfort level  Description  Interventions:  - Encourage patient to monitor pain and request assistance  - Assess pain using appropriate pain scale  - Administer analgesics based on type and severity of pain and evaluate response  - Implement non-pharmacological measures as appropriate and evaluate response  - Consider cultural and social influences on pain and pain management  - Notify physician/advanced practitioner if interventions unsuccessful or patient reports new pain  Outcome: Progressing     Problem: SAFETY ADULT  Goal: Maintain or return to baseline ADL function  Description  INTERVENTIONS:  -  Assess patient's ability to carry out ADLs; assess patient's baseline for ADL function and identify physical deficits which impact ability to perform ADLs (bathing, care of mouth/teeth, toileting, grooming, dressing, etc )  - Assess/evaluate cause of self-care deficits   - Assess range of motion  - Assess patient's mobility; develop plan if impaired  - Assess patient's need for assistive devices and provide as appropriate  - Encourage maximum independence but intervene and supervise when necessary  ¯ Involve family in performance of ADLs  ¯ Assess for home care needs following discharge   ¯ Request OT consult to assist with ADL evaluation and planning for discharge  ¯ Provide patient education as appropriate  Outcome: Progressing  Goal: Maintain or return mobility status to optimal level  Description  INTERVENTIONS:  - Assess patient's baseline mobility status (ambulation, transfers, stairs, etc )    - Identify cognitive and physical deficits and behaviors that affect mobility  - Identify mobility aids required to assist with transfers and/or ambulation (gait belt, sit-to-stand, lift, walker, cane, etc )  - [place] fall precautions as indicated by assessment  - Record patient progress and toleration of activity level on Mobility SBAR; progress patient to next Phase/Stage  - Instruct patient to call for assistance with activity based on assessment  - Request Rehabilitation consult to assist with strengthening/weightbearing, etc   Outcome: Progressing     Problem: DISCHARGE PLANNING  Goal: Discharge to home or other facility with appropriate resources  Description  INTERVENTIONS:  - Identify barriers to discharge w/patient and caregiver  - Arrange for needed discharge resources and transportation as appropriate  - Identify discharge learning needs (meds, wound care, etc )  - Arrange for interpretive services to assist at discharge as needed  - Refer to Case Management Department for coordinating discharge planning if the patient needs post-hospital services based on physician/advanced practitioner order or complex needs related to functional status, cognitive ability, or social support system  Outcome: Progressing     Problem: Knowledge Deficit  Goal: Patient/family/caregiver demonstrates understanding of disease process, treatment plan, medications, and discharge instructions  Description  Complete learning assessment and assess knowledge base  Interventions:  - Provide teaching at level of understanding  - Provide teaching via preferred learning methods  Outcome: Progressing     Problem: CARDIOVASCULAR - ADULT  Goal: Maintains optimal cardiac output and hemodynamic stability  Description  INTERVENTIONS:  - Monitor I/O, vital signs and rhythm  - Monitor for S/S and trends of decreased cardiac output i e  bleeding, hypotension  - Assess quality of pulses, skin color and temperature  - Assess for signs of decreased coronary artery perfusion - ex   Angina  - Instruct patient to report change in severity of symptoms   Outcome: Progressing  Goal: Absence of cardiac dysrhythmias or at baseline rhythm  Description  INTERVENTIONS:  - Continuous cardiac monitoring, monitor vital signs, obtain 12 lead EKG if indicated  - Administer antiarrhythmic and heart rate control medications as ordered  - Monitor electrolytes and administer replacement therapy as ordered  Outcome: Progressing     Problem: SKIN/TISSUE INTEGRITY - ADULT  Goal: Skin integrity remains intact  Description  INTERVENTIONS  - Identify patients at risk for skin breakdown  - Assess and monitor skin integrity  - Assess and monitor nutrition and hydration status  - Assess for incontinence   - Turn and reposition patient  - Assist with mobility/ambulation  - Relieve pressure over bony prominences  - Avoid friction and shearing  - Provide appropriate hygiene as needed including keeping skin clean and dry  - Evaluate need for skin moisturizer/barrier cream  - Collaborate with interdisciplinary team (i e  Nutrition, Rehabilitation, etc )   - Patient/family teaching   Outcome: Progressing  Goal: Incision(s), wounds(s) or drain site(s) healing without S/S of infection  Description  INTERVENTIONS  - Assess and document risk factors for skin impairment   - Assess and document dressing, incision, wound bed, drain sites and surrounding tissue  - Initiate Nutrition services consult and/or wound management as needed  Outcome: Progressing  Goal: Oral mucous membranes remain intact  Outcome: Progressing     Problem: INFECTION - ADULT  Goal: Absence or prevention of progression during hospitalization  Description  INTERVENTIONS:  - Assess and monitor for signs and symptoms of infection  - Monitor lab/diagnostic results  - Monitor all insertion sites, i e  indwelling lines, tubes, and drains  - Monitor endotracheal (as able) and nasal secretions for changes in amount and color  - [place] appropriate cooling/warming therapies per order  - Administer medications as ordered  - Instruct and encourage patient and family to use good hand hygiene technique  - Identify and instruct in appropriate isolation precautions for identified infection/condition  Outcome: Progressing  Goal: Absence of fever/infection during neutropenic period  Description  INTERVENTIONS:  - Monitor WBC  - Implement neutropenic guidelines  Outcome: Progressing     Problem: NEUROSENSORY - ADULT  Goal: Achieves stable or improved neurological status  Description  INTERVENTIONS  - Monitor and report changes in neurological status  - Initiate measures to prevent increased intracranial pressure  - Maintain blood pressure and fluid volume within ordered parameters to optimize cerebral perfusion  - Monitor temperature, glucose, and sodium or any other associated labs   Initiate appropriate interventions as ordered  - Monitor for seizure activity   - Administer anti-seizure medications as ordered  Outcome: Progressing  Goal: Absence of seizures  Description  INTERVENTIONS  - Monitor for seizure activity  - Administer anti-seizure medications as ordered  - Monitor neurological status  Outcome: Progressing  Goal: Remains free of injury related to seizures activity  Description  INTERVENTIONS  - Maintain airway, patient safety  and administer oxygen as ordered  - Monitor patient for seizure activity, document and report duration and description of seizure to physician/advanced practitioner  - If seizure occurs,  ensure patient safety during seizure  - Reorient patient post seizure  - Seizure pads on all 4 side rails  - Instruct patient/family to notify RN of any seizure activity including if an aura is experienced  - Instruct patient/family to call for assistance with activity based on nursing assessment  - Administer anti-seizure medications as ordered  - Monitor fetal well being  Outcome: Progressing  Goal: Achieves maximal functionality and self care  Description  INTERVENTIONS  - Monitor swallowing and airway patency with patient fatigue and changes in neurological status  - Encourage and assist patient to increase activity and self care with guidance from rehab services  - Encourage visually impaired, hearing impaired and aphasic patients to use assistive/communication devices  Outcome: Progressing     Problem: RESPIRATORY - ADULT  Goal: Achieves optimal ventilation and oxygenation  Description  INTERVENTIONS:  - Assess for changes in respiratory status  - Assess for changes in mentation and behavior  - Position to facilitate oxygenation and minimize respiratory effort  - Oxygen administration by appropriate delivery method based on oxygen saturation (per order) or ABGs  - Initiate smoking cessation education as indicated  - Encourage broncho-pulmonary hygiene including cough, deep breathe, Incentive Spirometry  - Assess the need for suctioning and aspirate as needed  - Assess and instruct to report SOB or any respiratory difficulty  - Respiratory Therapy support as indicated  Outcome: Progressing     Problem: GENITOURINARY - ADULT  Goal: Maintains or returns to baseline urinary function  Description  INTERVENTIONS:  - Assess urinary function  - Encourage oral fluids to ensure adequate hydration  - Administer IV fluids as ordered to ensure adequate hydration  - Administer ordered medications as needed  - Offer frequent toileting  - Follow urinary retention protocol if ordered  Outcome: Progressing  Goal: Absence of urinary retention  Description  INTERVENTIONS:  - Assess patient?s ability to void and empty bladder  - Monitor I/O  - Bladder scan as needed  - Discuss with physician/AP medications to alleviate retention as needed  - Discuss catheterization for long term situations as appropriate  Outcome: Progressing  Goal: Urinary catheter remains patent  Description  INTERVENTIONS:  - Assess patency of urinary catheter  - If patient has a chronic vang, consider changing catheter if non-functioning  - Follow guidelines for intermittent irrigation of non-functioning urinary catheter  Outcome: Progressing     Problem: DISCHARGE PLANNING - CARE MANAGEMENT  Goal: Discharge to post-acute care or home with appropriate resources  Description  INTERVENTIONS:  CM will f/u with POA papers and discharge needs for pt         - Conduct assessment to determine patient/family and health care team treatment goals, and need for post-acute services based on payer coverage, community resources, and patient preferences, and barriers to discharge  - Address psychosocial, clinical, and financial barriers to discharge as identified in assessment in conjunction with the patient/family and health care team  - Arrange appropriate level of post-acute services according to patient's   needs and preference and payer coverage in collaboration with the physician and health care team  - Communicate with and update the patient/family, physician, and health care team regarding progress on the discharge plan  - Arrange appropriate transportation to post-acute venues   Outcome: Progressing

## 2019-02-20 NOTE — PROGRESS NOTES
Progress Note - Cardiology   [facility] [de-identified] y o  female MRN: [id number]  Unit/Bed#: E4 -01 Encounter: [id number]        Problem List:  Principal Problem:    Acute ST elevation myocardial infarction (STEMI) ([facility] [address] )  Active Problems:    Type 2 diabetes mellitus, with long-term current use of insulin ([facility] [address] )    Memory impairment      Asessment/Plan:  1  STEMI  2  Hypertension  3  DM Type 2    Plan:   Had recurrent severe CP yesterday: Seen by attending cardiologist and interventionalist   She desired a cath, but it was determined no good benefit would come from reperfusion   Pursue aggressive medical management at this point   Aspirin/ Plavix/ Statin   BP has been soft -- no BB for now   With ongoing chest pain would continue heparin drip for now      EF significantly reduced but does not appear volume overloaded on my exam    Subjective:  Patient seen and examined at bedside  Reports continued substernal chest pressure  Associated with some shortness of breath  She says it is slowly improving from yesterday  Questionable historian, she says people are telling her she had a heart attack but she is not sure when this may have happened  Vitals:  Vitals:    02/17/19 1731 02/17/19 1929   Weight: 76 6 kg (168 lb 14 oz) 75 4 kg (166 lb 3 6 oz)   ,  Vitals:    02/19/19 1954 02/19/19 2324 02/20/19 0817 02/20/19 1202   BP: 103/65 94/53 95/63 95/68   BP Location: Right arm Right arm Right arm Right arm   Pulse: 100 86 88 84   Resp: 19 19 20 18   Temp: 98 2 °F (36 8 °C) 98 1 °F (36 7 °C) (!) 96 8 °F (36 °C) (!) 96 9 °F (36 1 °C)   TempSrc: Temporal Temporal Tympanic Tympanic   SpO2: 99% 97% 95% 95%   Weight:       Height:           Exam:  General: Alert & Oriented X 2  Somewhat uncomfortable appearing  Heart: RRR   Pericardial friction rub  Respiratory effort: Unlabored  Lungs: normal air entry, lungs clear to auscultation  Lower Limbs: no pitting edema               Medications:    Current Facility-Administered Medications:     acetaminophen (TYLENOL) tablet 325 mg, 325 mg, Oral, Q8H PRN, Duy Chaidez,     aspirin chewable tablet 324 mg, 324 mg, Oral, Daily, Rujul Hall, DO, 324 mg at 02/20/19 0904    atorvastatin (LIPITOR) tablet 80 mg, 80 mg, Oral, QPM, Rujul Hall, DO, 80 mg at 02/19/19 1713    citalopram (CeleXA) tablet 10 mg, 10 mg, Oral, Daily, Niraj Prechtel, DO, 10 mg at 02/20/19 0902    clopidogrel (PLAVIX) tablet 75 mg, 75 mg, Oral, Daily, Niraj Prechtel, DO, 75 mg at 02/20/19 0904    colchicine (COLCRYS) tablet 0 6 mg, 0 6 mg, Oral, BID, Rujul Hall, DO, 0 6 mg at 02/20/19 0903    famotidine (PEPCID) tablet 20 mg, 20 mg, Oral, BID, Niraj Prechtel, DO, 20 mg at 02/20/19 0904    heparin (porcine) 25,000 units in 250 mL infusion (premix), 3-20 Units/kg/hr (Order-Specific), Intravenous, Titrated, Hollie Nayak MD, Last Rate: 12 8 mL/hr at 02/20/19 0653, 17 Units/kg/hr at 02/20/19 0653    heparin (porcine) injection 2,000 Units, 2,000 Units, Intravenous, PRN, Hollie Nayak MD, 2,000 Units at 02/19/19 2040    heparin (porcine) injection 4,000 Units, 4,000 Units, Intravenous, PRN, Hollie Nayak MD    insulin glargine (LANTUS) subcutaneous injection 12 Units 0 12 mL, 12 Units, Subcutaneous, HS, Soundra Plum, CRNP, 12 Units at 02/19/19 2221    insulin lispro (HumaLOG) 100 units/mL subcutaneous injection 1-5 Units, 1-5 Units, Subcutaneous, HS, Niraj Prechtel, DO, 1 Units at 02/19/19 2220    insulin lispro (HumaLOG) 100 units/mL subcutaneous injection 1-6 Units, 1-6 Units, Subcutaneous, TID AC, 5 Units at 02/19/19 1713 **AND** Fingerstick Glucose (POCT), , , TID AC, Niraj Wang,     melatonin tablet 6 mg, 6 mg, Oral, HS, STORM Zambrano, 6 mg at 02/19/19 2221    morphine injection 1 mg, 1 mg, Intravenous, Q2H PRN, Canelo St MD, 1 mg at 02/20/19 0740    nitroglycerin (NITROSTAT) SL tablet 0 4 mg, 0 4 mg, Sublingual, Q5 Min PRN, Niraj Wang DO, 0 4 mg at 02/19/19 1237      Labs/Data:        Results from last 7 days   Lab Units 02/19/19  1321 02/18/19  0448 02/17/19  1715 02/17/19  1637   WBC Thousand/uL 11 96* 12 42*  --  18 53*   HEMOGLOBIN g/dL 11 5 11 0*  --  12 7   I STAT HEMOGLOBIN g/dl  --   --  12 9  --    HEMATOCRIT % 34 9 34 1*  --  38 6   HEMATOCRIT, ISTAT %  --   --  38  --    PLATELETS Thousands/uL 349 316  --  344     Results from last 7 days   Lab Units 02/19/19  0614 02/18/19  0448 02/17/19  2255 02/17/19  1958 02/17/19  1715 02/17/19  1645 02/17/19  1637   POTASSIUM mmol/L 3 5 2 9*  --   --   --   --  3 9   CHLORIDE mmol/L 102 101  --   --   --   --  95*   CO2 mmol/L 24 26  --   --   --   --  29   CO2, I-STAT mmol/L  --   --   --   --  27  --   --    BUN mg/dL 32* 50*  --   --   --   --  58*   TROPONIN I ng/mL  --   --  34 82* 39 20*  --  38 91*  --

## 2019-02-20 NOTE — PLAN OF CARE
Problem: Potential for Falls  Goal: Patient will remain free of falls  Description  INTERVENTIONS:  - Assess patient frequently for physical needs  -  Identify cognitive and physical deficits and behaviors that affect risk of falls    -  [place] fall precautions as indicated by assessment   - Educate patient/family on patient safety including physical limitations  - Instruct patient to call for assistance with activity based on assessment  - Modify environment to reduce risk of injury  - Consider OT/PT consult to assist with strengthening/mobility  Outcome: Progressing     Problem: Prexisting or High Potential for Compromised Skin Integrity  Goal: Skin integrity is maintained or improved  Description  INTERVENTIONS:  - Identify patients at risk for skin breakdown  - Assess and monitor skin integrity  - Assess and monitor nutrition and hydration status  - Monitor labs (i e  albumin)  - Assess for incontinence   - Turn and reposition patient  - Assist with mobility/ambulation  - Relieve pressure over bony prominences  - Avoid friction and shearing  - Provide appropriate hygiene as needed including keeping skin clean and dry  - Evaluate need for skin moisturizer/barrier cream  - Collaborate with interdisciplinary team (i e  Nutrition, Rehabilitation, etc )   - Patient/family teaching  Outcome: Progressing     Problem: PAIN - ADULT  Goal: Verbalizes/displays adequate comfort level or baseline comfort level  Description  Interventions:  - Encourage patient to monitor pain and request assistance  - Assess pain using appropriate pain scale  - Administer analgesics based on type and severity of pain and evaluate response  - Implement non-pharmacological measures as appropriate and evaluate response  - Consider cultural and social influences on pain and pain management  - Notify physician/advanced practitioner if interventions unsuccessful or patient reports new pain  Outcome: Progressing     Problem: SAFETY ADULT  Goal: Maintain or return to baseline ADL function  Description  INTERVENTIONS:  -  Assess patient's ability to carry out ADLs; assess patient's baseline for ADL function and identify physical deficits which impact ability to perform ADLs (bathing, care of mouth/teeth, toileting, grooming, dressing, etc )  - Assess/evaluate cause of self-care deficits   - Assess range of motion  - Assess patient's mobility; develop plan if impaired  - Assess patient's need for assistive devices and provide as appropriate  - Encourage maximum independence but intervene and supervise when necessary  ¯ Involve family in performance of ADLs  ¯ Assess for home care needs following discharge   ¯ Request OT consult to assist with ADL evaluation and planning for discharge  ¯ Provide patient education as appropriate  Outcome: Progressing  Goal: Maintain or return mobility status to optimal level  Description  INTERVENTIONS:  - Assess patient's baseline mobility status (ambulation, transfers, stairs, etc )    - Identify cognitive and physical deficits and behaviors that affect mobility  - Identify mobility aids required to assist with transfers and/or ambulation (gait belt, sit-to-stand, lift, walker, cane, etc )  - [place] fall precautions as indicated by assessment  - Record patient progress and toleration of activity level on Mobility SBAR; progress patient to next Phase/Stage  - Instruct patient to call for assistance with activity based on assessment  - Request Rehabilitation consult to assist with strengthening/weightbearing, etc   Outcome: Progressing     Problem: DISCHARGE PLANNING  Goal: Discharge to home or other facility with appropriate resources  Description  INTERVENTIONS:  - Identify barriers to discharge w/patient and caregiver  - Arrange for needed discharge resources and transportation as appropriate  - Identify discharge learning needs (meds, wound care, etc )  - Arrange for interpretive services to assist at discharge as needed  - Refer to Case Management Department for coordinating discharge planning if the patient needs post-hospital services based on physician/advanced practitioner order or complex needs related to functional status, cognitive ability, or social support system  Outcome: Progressing     Problem: Knowledge Deficit  Goal: Patient/family/caregiver demonstrates understanding of disease process, treatment plan, medications, and discharge instructions  Description  Complete learning assessment and assess knowledge base  Interventions:  - Provide teaching at level of understanding  - Provide teaching via preferred learning methods  Outcome: Progressing     Problem: CARDIOVASCULAR - ADULT  Goal: Maintains optimal cardiac output and hemodynamic stability  Description  INTERVENTIONS:  - Monitor I/O, vital signs and rhythm  - Monitor for S/S and trends of decreased cardiac output i e  bleeding, hypotension  - Assess quality of pulses, skin color and temperature  - Assess for signs of decreased coronary artery perfusion - ex   Angina  - Instruct patient to report change in severity of symptoms   Outcome: Progressing  Goal: Absence of cardiac dysrhythmias or at baseline rhythm  Description  INTERVENTIONS:  - Continuous cardiac monitoring, monitor vital signs, obtain 12 lead EKG if indicated  - Administer antiarrhythmic and heart rate control medications as ordered  - Monitor electrolytes and administer replacement therapy as ordered  Outcome: Progressing     Problem: SKIN/TISSUE INTEGRITY - ADULT  Goal: Skin integrity remains intact  Description  INTERVENTIONS  - Identify patients at risk for skin breakdown  - Assess and monitor skin integrity  - Assess and monitor nutrition and hydration status  - Assess for incontinence   - Turn and reposition patient  - Assist with mobility/ambulation  - Relieve pressure over bony prominences  - Avoid friction and shearing  - Provide appropriate hygiene as needed including keeping skin clean and dry  - Evaluate need for skin moisturizer/barrier cream  - Collaborate with interdisciplinary team (i e  Nutrition, Rehabilitation, etc )   - Patient/family teaching   Outcome: Progressing  Goal: Incision(s), wounds(s) or drain site(s) healing without S/S of infection  Description  INTERVENTIONS  - Assess and document risk factors for skin impairment   - Assess and document dressing, incision, wound bed, drain sites and surrounding tissue  - Initiate Nutrition services consult and/or wound management as needed  Outcome: Progressing  Goal: Oral mucous membranes remain intact  Outcome: Progressing     Problem: INFECTION - ADULT  Goal: Absence or prevention of progression during hospitalization  Description  INTERVENTIONS:  - Assess and monitor for signs and symptoms of infection  - Monitor lab/diagnostic results  - Monitor all insertion sites, i e  indwelling lines, tubes, and drains  - Monitor endotracheal (as able) and nasal secretions for changes in amount and color  - [place] appropriate cooling/warming therapies per order  - Administer medications as ordered  - Instruct and encourage patient and family to use good hand hygiene technique  - Identify and instruct in appropriate isolation precautions for identified infection/condition  Outcome: Progressing  Goal: Absence of fever/infection during neutropenic period  Description  INTERVENTIONS:  - Monitor WBC  - Implement neutropenic guidelines  Outcome: Progressing     Problem: NEUROSENSORY - ADULT  Goal: Achieves stable or improved neurological status  Description  INTERVENTIONS  - Monitor and report changes in neurological status  - Initiate measures to prevent increased intracranial pressure  - Maintain blood pressure and fluid volume within ordered parameters to optimize cerebral perfusion  - Monitor temperature, glucose, and sodium or any other associated labs   Initiate appropriate interventions as ordered  - Monitor for seizure activity   - Administer anti-seizure medications as ordered  Outcome: Progressing  Goal: Absence of seizures  Description  INTERVENTIONS  - Monitor for seizure activity  - Administer anti-seizure medications as ordered  - Monitor neurological status  Outcome: Progressing  Goal: Remains free of injury related to seizures activity  Description  INTERVENTIONS  - Maintain airway, patient safety  and administer oxygen as ordered  - Monitor patient for seizure activity, document and report duration and description of seizure to physician/advanced practitioner  - If seizure occurs,  ensure patient safety during seizure  - Reorient patient post seizure  - Seizure pads on all 4 side rails  - Instruct patient/family to notify RN of any seizure activity including if an aura is experienced  - Instruct patient/family to call for assistance with activity based on nursing assessment  - Administer anti-seizure medications as ordered  - Monitor fetal well being  Outcome: Progressing  Goal: Achieves maximal functionality and self care  Description  INTERVENTIONS  - Monitor swallowing and airway patency with patient fatigue and changes in neurological status  - Encourage and assist patient to increase activity and self care with guidance from rehab services  - Encourage visually impaired, hearing impaired and aphasic patients to use assistive/communication devices  Outcome: Progressing     Problem: RESPIRATORY - ADULT  Goal: Achieves optimal ventilation and oxygenation  Description  INTERVENTIONS:  - Assess for changes in respiratory status  - Assess for changes in mentation and behavior  - Position to facilitate oxygenation and minimize respiratory effort  - Oxygen administration by appropriate delivery method based on oxygen saturation (per order) or ABGs  - Initiate smoking cessation education as indicated  - Encourage broncho-pulmonary hygiene including cough, deep breathe, Incentive Spirometry  - Assess the need for suctioning and aspirate as needed  - Assess and instruct to report SOB or any respiratory difficulty  - Respiratory Therapy support as indicated  Outcome: Progressing     Problem: GENITOURINARY - ADULT  Goal: Maintains or returns to baseline urinary function  Description  INTERVENTIONS:  - Assess urinary function  - Encourage oral fluids to ensure adequate hydration  - Administer IV fluids as ordered to ensure adequate hydration  - Administer ordered medications as needed  - Offer frequent toileting  - Follow urinary retention protocol if ordered  Outcome: Progressing  Goal: Absence of urinary retention  Description  INTERVENTIONS:  - Assess patient?s ability to void and empty bladder  - Monitor I/O  - Bladder scan as needed  - Discuss with physician/AP medications to alleviate retention as needed  - Discuss catheterization for long term situations as appropriate  Outcome: Progressing  Goal: Urinary catheter remains patent  Description  INTERVENTIONS:  - Assess patency of urinary catheter  - If patient has a chronic vang, consider changing catheter if non-functioning  - Follow guidelines for intermittent irrigation of non-functioning urinary catheter  Outcome: Progressing     Problem: DISCHARGE PLANNING - CARE MANAGEMENT  Goal: Discharge to post-acute care or home with appropriate resources  Description  INTERVENTIONS:  CM will f/u with POA papers and discharge needs for pt         - Conduct assessment to determine patient/family and health care team treatment goals, and need for post-acute services based on payer coverage, community resources, and patient preferences, and barriers to discharge  - Address psychosocial, clinical, and financial barriers to discharge as identified in assessment in conjunction with the patient/family and health care team  - Arrange appropriate level of post-acute services according to patient's   needs and preference and payer coverage in collaboration with the physician and health care team  - Communicate with and update the patient/family, physician, and health care team regarding progress on the discharge plan  - Arrange appropriate transportation to post-acute venues   Outcome: Progressing

## 2019-02-21 NOTE — OCCUPATIONAL THERAPY NOTE
[address] Evaluation     Patient Name: Sumit Infante  RGVCK'D Date: 2/21/2019  Problem List  Patient Active Problem List   Diagnosis    Encephalopathy    Risk for falls    Debility    Type 2 diabetes mellitus, with long-term current use of insulin ([facility] )    Urinary retention    [facility]    Memory impairment    UTI (urinary tract infection)    Acute combined systolic (congestive) and diastolic (congestive) heart failure (HCC)    Hypotension    Anxiety    Leukocytosis    Pericarditis     Past Medical History  Past Medical History:   Diagnosis Date    Arthritis     Diabetes mellitus [facility]     Hypertension     Kidney failure     Memory loss     Retention of urine     Sepsis ([facility] )      Past Surgical History  Past Surgical History:   Procedure Laterality Date    OTHER SURGICAL HISTORY      states "can't recall"             02/21/19 5002   Note Type   Note type Eval/Treat   Restrictions/Precautions   Weight Bearing Precautions Per Order No   Other Precautions Pain; Fall Risk;Multiple lines;Telemetry;O2; Chair Alarm;Cognitive; Bed Alarm   Pain Assessment   Pain Assessment No/denies pain   Pain Score No Pain   Home Living   Type of Home Apartment   Home Layout One level;Elevator   Bathroom Shower/Tub Tub/shower unit   Bathroom Toilet Standard   Bathroom Equipment Grab bars in shower;Commode; Shower chair   Bathroom Accessibility Accessible   Home Equipment Walker;Grab bars; Reacher   Additional Comments pt reports has a friend in her apartment building who checks on her daily and assists w/ showers and IADLs   Prior Function   Level of [place] Independent with ADLs and functional mobility; Needs assistance with IADLs  (assist w/ showers, independent w/ dressing)   Lives With Alone   Receives Help From Friend(s)   ADL Assistance Independent   IADLs Needs assistance   Falls in the last 6 months 0  (denies falls)   Vocational Retired   Comments pt reports friend takes her to appointments and takes her grocery shopping   Lifestyle   Autonomy per pt independent w/ dressing and assist w/ showers, independent w/ functional transfers and mobility w/ RW, assist IADLs and transportation   Reciprocal Relationships friend   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   Intrinsic Gratification reading   ADL   Where Assessed Chair   Eating Assistance 5  [facility] [address]  [address] [place] [address] Dr Shetty 1 4  [facility] [address] Scott Deficit Setup;Steadying;Verbal cueing;Supervison/safety; Increased time to complete;Grab bar use;Clothing management up;Clothing management down   Functional Assistance 4  Minimal Assistance   Bed Mobility   Supine to Sit 4  Minimal assistance   Additional items Assist x 1; Increased time required;Verbal cues;LE management; Bedrails;HOB elevated   Transfers   Sit to Stand 4  Minimal assistance   Additional items Assist x 1; Increased time required;Verbal cues;Armrests   Stand to Sit 4  Minimal assistance   Additional items Assist x 1; Increased time required;Verbal cues;Armrests   Additional Comments VCs for hand placement   Functional Mobility   Functional Mobility 4  Minimal assistance   Additional Comments assist x1 w/ increased time to complete; assist 02 line management   Additional items Rolling walker   Balance   Static Sitting Good   Dynamic Sitting Fair +   Static Standing Fair -   Dynamic Standing Poor +   Ambulatory Poor +   Activity Tolerance   Activity Tolerance Patient limited by fatigue;Patient limited by pain   Nurse Made Aware appropriate to see per RNShalom Assessment   RUE Assessment WFL  (4-/5)   LUE Assessment   LUE Assessment WFL  (4-/5)   Hand Function   Gross Motor Coordination Functional Fine Motor Coordination Impaired   Sensation   Light Touch No apparent deficits   Vision-Basic Assessment   Current Vision No visual deficits   Vision - Complex Assessment   Ocular Range of Motion WFL   Acuity Able to read clock/calendar on wall without difficulty   Perception   Inattention/Neglect Appears intact   Cognition   Overall Cognitive Status Impaired   Arousal/Participation Responsive   Attention Difficulty attending to directions   Orientation Level Oriented to person;Oriented to place; Disoriented to situation;Disoriented to time  (reported it was summer because the son was out)   Memory Decreased short term memory;Decreased recall of recent events;Decreased recall of precautions   Following Commands Follows one step commands with increased time or repetition   Comments pt w/ impaired insight and safety awareness, impaired STM; unable to report name of apartment building   Assessment   Limitation Decreased ADL status; Decreased UE strength;Decreased Safe judgement during ADL;Decreased cognition;Decreased endurance;Decreased high-level ADLs; Decreased self-care trans   Prognosis Good   Assessment Pt is a [de-identified] y o  female seen for OT evaluation s/p admit to [facility] on 2/17/[facility] w/ Acute ST elevation myocardial infarction (STEMI) ([facility] )  Pt currently on 4L O2 and no O2 at baseline  Comorbidities affecting pt's functional performance at time of assessment include: ischemic cardiomyopathy EF 28%, DM II, anxiety  Personal factors affecting pt at time of IE include:lives alone  Prior to admission, pt was living w/ alone and reports has a friend who comes and visits her daily and assists w/ IADLs  Per pt independent w/ dressing, supervision for showers, independent w/ functional transfers and mobility w/ RW, assist w/ IADLs   Upon evaluation: Pt requires MIN assist supine<>sit bed mobility, MIN assist sit<>stand w/ VCs for hand placement and positioning, MIN assist functional mobility w/ RW w/ assist for O2 line management 2* the following deficits impacting occupational performance: decreased strength and endurance, impaired balance, impaired cognition (insight, safety awareness, STM, impaired orientation), multiple lines  Pt to benefit from continued skilled OT tx while in the hospital to address deficits as defined above and maximize level of functional independence w ADL's and functional mobility  Occupational Performance areas to address include: grooming, bathing/shower, toilet hygiene, dressing, functional mobility, clothing management, cleaning and meal prep, home safety education, formal cognitive assessment  From OT standpoint, recommendation at time of d/c would be short term rehab and OT to complete formal cognitive assessment to determine level of supervision needed  Goals   Patient Goals "to go home"   LTG Time Frame 10-14   Long Term Goal please see below goals   Plan   Treatment Interventions ADL retraining;Functional transfer training;UE strengthening/ROM; Endurance training;Cognitive reorientation;Patient/family training;Equipment evaluation/education; Compensatory technique education; Energy conservation; Activityengagement   Goal Expiration Date 03/07/19   OT Frequency 3-5x/wk   Recommendation   OT Discharge Recommendation Short Term Rehab   [place] Index   Feeding 10   Bathing 0   Grooming Score 5   Dressing Score 5   Bladder Score 10   Bowels Score 10   Toilet Use Score 5   Transfers (Bed/Chair) Score 10   Mobility (Level Surface) Score 0   Stairs Score 0   [place] Index Score 55   Modified [place] Scale   Modified [place] Scale 4     Occupational Therapy Goals to be met in 10-14 days:  1) Pt will improve activity tolerance to G for min 30 min txment sessions to enhance ADLs  2) Pt will complete ADLs/self care w/ supervision  3) Pt will complete toileting w/ supervision w/ G hygiene/thoroughness using DME PRN  4) Pt will improve functional transfers on/off all surfaces using DME PRN w/ G balance/safety including toileting w/ supervision  5) Pt will improve fx'l mobility during I/ADl/leisure tasks using DME PRN w/ g balance/safety w/ supervision  6) Pt will engage in ongoing cognitive assessment w/ G participation to A w/ safe d/c planning/recommendations  7) Pt will demonstrate G carryover of pt/caregiver education and training as appropriate w/ mod I  w/ G tolerance  8) Pt will engage in depression screen/leisure interest checklist w/ G participation to monitor s/s depression and ID 3 positive coping strategies to A w/ emotional regulation and management  9) Pt will demonstrate 100% carryover of E C  techniques w/ mod I t/o fx'l I/ADL/leisure tasks w/o cues s/p skilled education  10) Pt will demonstrate improved bed mobility to MOD I to enhance ADLs   11) Pt will demonstrate 100% carryover of LHAE for LB ADLs/self care and leisure s/p skilled education w/ mod I and G participation  12) Pt will demonstrate improved standing tolerance to 3-5 minutes during functional tasks w/ no LOB to enhance ADL performance  13) Pt will demonstrate improved b/l UE strength by 1 MMT grade to enhance ADLS and functional transfers    Documentation completed by: Patsy Brandon MS, OTR/L

## 2019-02-21 NOTE — PROGRESS NOTES
Progress Note - Cardiology   Lor Segura [de-identified] y o  female MRN: [id number]  Unit/Bed#: E4 -01 Encounter: [id number]      Assessment/Recommendations/Discussion:   1  Acute anterior wall STEMI, late presentation, patient refusing coronary angiography  2  Benign essential hypertension  3  Dyslipidemia  4  Type 2 diabetes  5  Ischemic cardiomyopathy, ejection fraction 28%  6  Probable post MI pericarditis--CP resolved    · No further chest pain, continue aspirin, colchicine, atorvastatin, clopidogrel--can likely reduced ASA to once daily before d/c  · No signs or symptoms of congestive heart failure, although high risk of developing the same    May start low-dose furosemide if blood pressure tolerates in the future  · Start low-dose lisinopril and carvedilol, blood pressure seems improved  · Wean off oxygen, ambulate      Subjective:  Patient seen and examined, feels much better today, no shortness of breath, no chest pain      Physical Exam:  GEN:  NAD  HEENT:  MMM, NCAT, pink conjunctiva, EOMI, nonicteric sclera  CV:  NO JVD/HJR, RR, NO M/R/G, +S1/S2, NO PARASTERNAL HEAVE/THRILL, NO LE EDEMA, NO HEPATIC SYSTOLIC PULSATION, WARM EXTREMITIES  RESP:  CTAB/L  ABD:  SOFT, NT, NO GROSS ORGANOMEGALY        Vitals:   /75 (BP Location: Left arm)   Pulse 85   Temp (!) 97 °F (36 1 °C) (Tympanic)   Resp 18   Ht 5' (1 524 m)   Wt 75 4 kg (166 lb 3 6 oz)   SpO2 94%   BMI 32 46 kg/m²   Vitals:    02/17/19 1731 02/17/19 1929   Weight: 76 6 kg (168 lb 14 oz) 75 4 kg (166 lb 3 6 oz)       Intake/Output Summary (Last 24 hours) at 2/21/2019 0953  Last data filed at 2/21/2019 0830  Gross per 24 hour   Intake 360 ml   Output    Net 360 ml         Lab Results:  Results from last 7 days   Lab Units 02/21/19  0512   WBC Thousand/uL 13 34*   HEMOGLOBIN g/dL 11 1*   HEMATOCRIT % 35 9   PLATELETS Thousands/uL 363     Results from last 7 days   Lab Units 02/21/19  0512  02/17/19  1715 02/17/19  1637   POTASSIUM mmol/L 3 7   < > --  3 9   CHLORIDE mmol/L 105   < >  --  95*   CO2 mmol/L 24   < >  --  29   CO2, I-STAT mmol/L  --   --  27  --    BUN mg/dL 25   < >  --  58*   CREATININE mg/dL 0 65   < >  --  0 93   CALCIUM mg/dL 8 6   < >  --  8 4   ALK PHOS U/L  --   --   --  72   ALT U/L  --   --   --  32   AST U/L  --   --   --  148*   GLUCOSE, ISTAT mg/dl  --   --  97  --     < > = values in this interval not displayed  Results from last 7 days   Lab Units 02/21/19  0512  02/17/19  1715   POTASSIUM mmol/L 3 7   < >  --    CHLORIDE mmol/L 105   < >  --    CO2 mmol/L 24   < >  --    CO2, I-STAT mmol/L  --   --  27   BUN mg/dL 25   < >  --    CREATININE mg/dL 0 65   < >  --    GLUCOSE, ISTAT mg/dl  --   --  97   CALCIUM mg/dL 8 6   < >  --     < > = values in this interval not displayed             Medications:    Current Facility-Administered Medications:     acetaminophen (TYLENOL) tablet 325 mg, 325 mg, Oral, Q8H PRN, Niraj Prechtel, DO    ALPRAZolam (XANAX) tablet 0 25 mg, 0 25 mg, Oral, TID PRN, Kayy Boswell MD    aspirin chewable tablet 324 mg, 324 mg, Oral, BID, Rujul Hall, DO, 324 mg at 02/21/19 0900    atorvastatin (LIPITOR) tablet 40 mg, 40 mg, Oral, QPM, Rujul Hall, DO, 40 mg at 02/20/19 1658    cephalexin (KEFLEX) capsule 500 mg, 500 mg, Oral, Q6H [address], Kayy Boswell MD, 500 mg at 02/21/19 0644    citalopram (CeleXA) tablet 10 mg, 10 mg, Oral, Daily, Nriaj Prechtel, DO, 10 mg at 02/21/19 0900    clopidogrel (PLAVIX) tablet 75 mg, 75 mg, Oral, Daily, Niraj Prechtel, DO, 75 mg at 02/21/19 0900    colchicine (COLCRYS) tablet 1 2 mg, 1 2 mg, Oral, BID, Rujul Hall, DO, 1 2 mg at 02/21/19 0900    famotidine (PEPCID) tablet 20 mg, 20 mg, Oral, BID, Niraj Prechtel, DO, 20 mg at 02/21/19 0900    insulin glargine (LANTUS) subcutaneous injection 12 Units 0 12 mL, 12 Units, Subcutaneous, HS, Marla Corral, STORM, 12 Units at 02/21/19 0028    insulin lispro (HumaLOG) 100 units/mL subcutaneous injection 1-5 Units, 1-5 Units, Subcutaneous, HS, Niraj Prechtel, DO, 1 Units at 02/21/19 0028    insulin lispro (HumaLOG) 100 units/mL subcutaneous injection 1-6 Units, 1-6 Units, Subcutaneous, TID AC, 2 Units at 02/20/19 1657 **AND** Fingerstick Glucose (POCT), , , TID AC, Niraj Prechtel, DO    melatonin tablet 6 mg, 6 mg, Oral, HS, Earleen Brent, [place]IN[place], 6 mg at 02/21/19 0028    morphine injection 1 mg, 1 mg, Intravenous, Q2H PRN, Brody Phipps MD, 1 mg at 02/20/19 0740    nitroglycerin (NITROSTAT) SL tablet 0 4 mg, 0 4 mg, Sublingual, Q5 Min PRN, Niraj Prechtel, DO, 0 4 mg at 02/19/19 1237    This note was completed in part utilizing M-Modal Fluency Direct Software  Grammatical errors, random word insertions, spelling mistakes, and incomplete sentences may be an occasional consequence of this system secondary to software limitations, ambient noise, and hardware issues  If you have any questions or concerns about the content, text, or information contained within the body of this dictation, please contact the provider for clarification

## 2019-02-21 NOTE — PLAN OF CARE
Problem: OCCUPATIONAL THERAPY ADULT  Goal: Performs self-care activities at highest level of function for planned discharge setting  See evaluation for individualized goals  Description  Treatment Interventions: ADL retraining, Functional transfer training, UE strengthening/ROM, Endurance training, Cognitive reorientation, Patient/family training, Equipment evaluation/education, Compensatory technique education, Energy conservation, Activityengagement          See flowsheet documentation for full assessment, interventions and recommendations  Note:   Limitation: Decreased ADL status, Decreased UE strength, Decreased Safe judgement during ADL, Decreased cognition, Decreased endurance, Decreased high-level ADLs, Decreased self-care trans  Prognosis: Good  Assessment: Pt is a [de-identified] y o  female seen for OT evaluation s/p admit to [facility] on 2/17/2019 w/ Acute ST elevation myocardial infarction (STEMI) ([facility] [address] )  Pt currently on 4L O2 and no O2 at baseline  Comorbidities affecting pt's functional performance at time of assessment include: ischemic cardiomyopathy EF 28%, DM II, anxiety  Personal factors affecting pt at time of IE include:lives alone  Prior to admission, pt was living w/ alone and reports has a friend who comes and visits her daily and assists w/ IADLs  Per pt independent w/ dressing, supervision for showers, independent w/ functional transfers and mobility w/ RW, assist w/ IADLs  Upon evaluation: Pt requires MIN assist supine<>sit bed mobility, MIN assist sit<>stand w/ VCs for hand placement and positioning, MIN assist functional mobility w/ RW w/ assist for O2 line management 2* the following deficits impacting occupational performance: decreased strength and endurance, impaired balance, impaired cognition (insight, safety awareness, STM, impaired orientation), multiple lines    Pt to benefit from continued skilled OT tx while in the hospital to address deficits as defined above and maximize level of functional independence w ADL's and functional mobility  Occupational Performance areas to address include: grooming, bathing/shower, toilet hygiene, dressing, functional mobility, clothing management, cleaning and meal prep, home safety education, formal cognitive assessment  From OT standpoint, recommendation at time of d/c would be short term rehab and OT to complete formal cognitive assessment to determine level of supervision needed       OT Discharge Recommendation: Short Term Rehab

## 2019-02-21 NOTE — PLAN OF CARE
Problem: Potential for Falls  Goal: Patient will remain free of falls  Description  INTERVENTIONS:  - Assess patient frequently for physical needs  -  Identify cognitive and physical deficits and behaviors that affect risk of falls    -  [place] fall precautions as indicated by assessment   - Educate patient/family on patient safety including physical limitations  - Instruct patient to call for assistance with activity based on assessment  - Modify environment to reduce risk of injury  - Consider OT/PT consult to assist with strengthening/mobility  Outcome: Progressing     Problem: Prexisting or High Potential for Compromised Skin Integrity  Goal: Skin integrity is maintained or improved  Description  INTERVENTIONS:  - Identify patients at risk for skin breakdown  - Assess and monitor skin integrity  - Assess and monitor nutrition and hydration status  - Monitor labs (i e  albumin)  - Assess for incontinence   - Turn and reposition patient  - Assist with mobility/ambulation  - Relieve pressure over bony prominences  - Avoid friction and shearing  - Provide appropriate hygiene as needed including keeping skin clean and dry  - Evaluate need for skin moisturizer/barrier cream  - Collaborate with interdisciplinary team (i e  Nutrition, Rehabilitation, etc )   - Patient/family teaching  Outcome: Progressing     Problem: PAIN - ADULT  Goal: Verbalizes/displays adequate comfort level or baseline comfort level  Description  Interventions:  - Encourage patient to monitor pain and request assistance  - Assess pain using appropriate pain scale  - Administer analgesics based on type and severity of pain and evaluate response  - Implement non-pharmacological measures as appropriate and evaluate response  - Consider cultural and social influences on pain and pain management  - Notify physician/advanced practitioner if interventions unsuccessful or patient reports new pain  Outcome: Progressing     Problem: SAFETY ADULT  Goal: Maintain or return to baseline ADL function  Description  INTERVENTIONS:  -  Assess patient's ability to carry out ADLs; assess patient's baseline for ADL function and identify physical deficits which impact ability to perform ADLs (bathing, care of mouth/teeth, toileting, grooming, dressing, etc )  - Assess/evaluate cause of self-care deficits   - Assess range of motion  - Assess patient's mobility; develop plan if impaired  - Assess patient's need for assistive devices and provide as appropriate  - Encourage maximum independence but intervene and supervise when necessary  ¯ Involve family in performance of ADLs  ¯ Assess for home care needs following discharge   ¯ Request OT consult to assist with ADL evaluation and planning for discharge  ¯ Provide patient education as appropriate  Outcome: Progressing  Goal: Maintain or return mobility status to optimal level  Description  INTERVENTIONS:  - Assess patient's baseline mobility status (ambulation, transfers, stairs, etc )    - Identify cognitive and physical deficits and behaviors that affect mobility  - Identify mobility aids required to assist with transfers and/or ambulation (gait belt, sit-to-stand, lift, walker, cane, etc )  - [place] fall precautions as indicated by assessment  - Record patient progress and toleration of activity level on Mobility SBAR; progress patient to next Phase/Stage  - Instruct patient to call for assistance with activity based on assessment  - Request Rehabilitation consult to assist with strengthening/weightbearing, etc   Outcome: Progressing     Problem: DISCHARGE PLANNING  Goal: Discharge to home or other facility with appropriate resources  Description  INTERVENTIONS:  - Identify barriers to discharge w/patient and caregiver  - Arrange for needed discharge resources and transportation as appropriate  - Identify discharge learning needs (meds, wound care, etc )  - Arrange for interpretive services to assist at discharge as needed  - Refer to Case Management Department for coordinating discharge planning if the patient needs post-hospital services based on physician/advanced practitioner order or complex needs related to functional status, cognitive ability, or social support system  Outcome: Progressing     Problem: Knowledge Deficit  Goal: Patient/family/caregiver demonstrates understanding of disease process, treatment plan, medications, and discharge instructions  Description  Complete learning assessment and assess knowledge base  Interventions:  - Provide teaching at level of understanding  - Provide teaching via preferred learning methods  Outcome: Progressing     Problem: CARDIOVASCULAR - ADULT  Goal: Maintains optimal cardiac output and hemodynamic stability  Description  INTERVENTIONS:  - Monitor I/O, vital signs and rhythm  - Monitor for S/S and trends of decreased cardiac output i e  bleeding, hypotension  - Assess quality of pulses, skin color and temperature  - Assess for signs of decreased coronary artery perfusion - ex   Angina  - Instruct patient to report change in severity of symptoms   Outcome: Progressing  Goal: Absence of cardiac dysrhythmias or at baseline rhythm  Description  INTERVENTIONS:  - Continuous cardiac monitoring, monitor vital signs, obtain 12 lead EKG if indicated  - Administer antiarrhythmic and heart rate control medications as ordered  - Monitor electrolytes and administer replacement therapy as ordered  Outcome: Progressing     Problem: SKIN/TISSUE INTEGRITY - ADULT  Goal: Skin integrity remains intact  Description  INTERVENTIONS  - Identify patients at risk for skin breakdown  - Assess and monitor skin integrity  - Assess and monitor nutrition and hydration status  - Assess for incontinence   - Turn and reposition patient  - Assist with mobility/ambulation  - Relieve pressure over bony prominences  - Avoid friction and shearing  - Provide appropriate hygiene as needed including keeping skin clean and dry  - Evaluate need for skin moisturizer/barrier cream  - Collaborate with interdisciplinary team (i e  Nutrition, Rehabilitation, etc )   - Patient/family teaching   Outcome: Progressing  Goal: Incision(s), wounds(s) or drain site(s) healing without S/S of infection  Description  INTERVENTIONS  - Assess and document risk factors for skin impairment   - Assess and document dressing, incision, wound bed, drain sites and surrounding tissue  - Initiate Nutrition services consult and/or wound management as needed  Outcome: Progressing  Goal: Oral mucous membranes remain intact  Outcome: Progressing     Problem: INFECTION - ADULT  Goal: Absence or prevention of progression during hospitalization  Description  INTERVENTIONS:  - Assess and monitor for signs and symptoms of infection  - Monitor lab/diagnostic results  - Monitor all insertion sites, i e  indwelling lines, tubes, and drains  - Monitor endotracheal (as able) and nasal secretions for changes in amount and color  - [place] appropriate cooling/warming therapies per order  - Administer medications as ordered  - Instruct and encourage patient and family to use good hand hygiene technique  - Identify and instruct in appropriate isolation precautions for identified infection/condition  Outcome: Progressing  Goal: Absence of fever/infection during neutropenic period  Description  INTERVENTIONS:  - Monitor WBC  - Implement neutropenic guidelines  Outcome: Progressing     Problem: NEUROSENSORY - ADULT  Goal: Achieves stable or improved neurological status  Description  INTERVENTIONS  - Monitor and report changes in neurological status  - Initiate measures to prevent increased intracranial pressure  - Maintain blood pressure and fluid volume within ordered parameters to optimize cerebral perfusion  - Monitor temperature, glucose, and sodium or any other associated labs   Initiate appropriate interventions as ordered  - Monitor for seizure activity   - Administer anti-seizure medications as ordered  Outcome: Progressing  Goal: Absence of seizures  Description  INTERVENTIONS  - Monitor for seizure activity  - Administer anti-seizure medications as ordered  - Monitor neurological status  Outcome: Progressing  Goal: Remains free of injury related to seizures activity  Description  INTERVENTIONS  - Maintain airway, patient safety  and administer oxygen as ordered  - Monitor patient for seizure activity, document and report duration and description of seizure to physician/advanced practitioner  - If seizure occurs,  ensure patient safety during seizure  - Reorient patient post seizure  - Seizure pads on all 4 side rails  - Instruct patient/family to notify RN of any seizure activity including if an aura is experienced  - Instruct patient/family to call for assistance with activity based on nursing assessment  - Administer anti-seizure medications as ordered  - Monitor fetal well being  Outcome: Progressing  Goal: Achieves maximal functionality and self care  Description  INTERVENTIONS  - Monitor swallowing and airway patency with patient fatigue and changes in neurological status  - Encourage and assist patient to increase activity and self care with guidance from rehab services  - Encourage visually impaired, hearing impaired and aphasic patients to use assistive/communication devices  Outcome: Progressing     Problem: RESPIRATORY - ADULT  Goal: Achieves optimal ventilation and oxygenation  Description  INTERVENTIONS:  - Assess for changes in respiratory status  - Assess for changes in mentation and behavior  - Position to facilitate oxygenation and minimize respiratory effort  - Oxygen administration by appropriate delivery method based on oxygen saturation (per order) or ABGs  - Initiate smoking cessation education as indicated  - Encourage broncho-pulmonary hygiene including cough, deep breathe, Incentive Spirometry  - Assess the need for suctioning and aspirate as needed  - Assess and instruct to report SOB or any respiratory difficulty  - Respiratory Therapy support as indicated  Outcome: Progressing     Problem: GENITOURINARY - ADULT  Goal: Maintains or returns to baseline urinary function  Description  INTERVENTIONS:  - Assess urinary function  - Encourage oral fluids to ensure adequate hydration  - Administer IV fluids as ordered to ensure adequate hydration  - Administer ordered medications as needed  - Offer frequent toileting  - Follow urinary retention protocol if ordered  Outcome: Progressing  Goal: Absence of urinary retention  Description  INTERVENTIONS:  - Assess patient?s ability to void and empty bladder  - Monitor I/O  - Bladder scan as needed  - Discuss with physician/AP medications to alleviate retention as needed  - Discuss catheterization for long term situations as appropriate  Outcome: Progressing  Goal: Urinary catheter remains patent  Description  INTERVENTIONS:  - Assess patency of urinary catheter  - If patient has a chronic vang, consider changing catheter if non-functioning  - Follow guidelines for intermittent irrigation of non-functioning urinary catheter  Outcome: Progressing     Problem: DISCHARGE PLANNING - CARE MANAGEMENT  Goal: Discharge to post-acute care or home with appropriate resources  Description  INTERVENTIONS:    RN reports friend Amy Dailey brought in [place] paperwork listing her as POA for pt  Discharge plan at this time is home pending PT eval      CM will f/u with POA papers and discharge needs for pt         - Conduct assessment to determine patient/family and health care team treatment goals, and need for post-acute services based on payer coverage, community resources, and patient preferences, and barriers to discharge  - Address psychosocial, clinical, and financial barriers to discharge as identified in assessment in conjunction with the patient/family and health care team  - Arrange appropriate level of post-acute services according to patient's   needs and preference and payer coverage in collaboration with the physician and health care team  - Communicate with and update the patient/family, physician, and health care team regarding progress on the discharge plan  - Arrange appropriate transportation to post-acute venues    Outcome: Progressing

## 2019-02-22 NOTE — PROGRESS NOTES
Progress Note - Cardiology   Chicho Krueger [de-identified] y o  female MRN: [id number]  Unit/Bed#: E4 -01 Encounter: [id number]      Assessment:     1  Acute anterior wall STEMI with resulting ischemic cardiomyopathy with ejection fraction 28%-refused coronary angiography - probable post myocardial infarction pericarditis-resolved  2  Diabetes  3  Hypertension  4  Dyslipidemia    Discussion/Recommendations:    Continue beta-blocker/ACE-inhibitor/aspirin/Plavix/statin  Will reduce aspirin  Will discontinue colchicine  Subjective:  No chest pain or trouble breathing        Physical Exam:  GEN:  NAD  HEENT:  MMM, NCAT, pink conjunctiva, EOMI, nonicteric sclera  CV:  NO JVD/HJR, RR, NO M/R/G, +S1/S2, NO PARASTERNAL HEAVE/THRILL, NO LE EDEMA, NO HEPATIC SYSTOLIC PULSATION, WARM EXTREMITIES  RESP:  CTAB/L  ABD:  SOFT, NT, NO GROSS ORGANOMEGALY        Vitals:   /65 (BP Location: Right arm)   Pulse 72   Temp (!) 97 3 °F (36 3 °C) (Tympanic)   Resp 18   Ht 5' (1 524 m)   Wt 75 4 kg (166 lb 3 6 oz)   SpO2 97%   BMI 32 46 kg/m²   Vitals:    02/17/19 1731 02/17/19 1929   Weight: 76 6 kg (168 lb 14 oz) 75 4 kg (166 lb 3 6 oz)       Intake/Output Summary (Last 24 hours) at 2/22/2019 1336  Last data filed at 2/22/2019 0940  Gross per 24 hour   Intake 420 ml   Output    Net 420 ml         TELEMETRY:  No significant events  Lab Results:  Results from last 7 days   Lab Units 02/22/19  0605   WBC Thousand/uL 12 93*   HEMOGLOBIN g/dL 11 7   HEMATOCRIT % 37 4   PLATELETS Thousands/uL 442*     Results from last 7 days   Lab Units 02/21/19  0512  02/17/19  1715 02/17/19  1637   POTASSIUM mmol/L 3 7   < >  --  3 9   CHLORIDE mmol/L 105   < >  --  95*   CO2 mmol/L 24   < >  --  29   CO2, I-STAT mmol/L  --   --  27  --    BUN mg/dL 25   < >  --  58*   CREATININE mg/dL 0 65   < >  --  0 93   CALCIUM mg/dL 8 6   < >  --  8 4   ALK PHOS U/L  --   --   --  72   ALT U/L  --   --   --  32   AST U/L  --   --   --  148*   GLUCOSE, ISTAT mg/dl  --   --  97  --     < > = values in this interval not displayed  Results from last 7 days   Lab Units 02/21/19  0512  02/17/19  1715   POTASSIUM mmol/L 3 7   < >  --    CHLORIDE mmol/L 105   < >  --    CO2 mmol/L 24   < >  --    CO2, I-STAT mmol/L  --   --  27   BUN mg/dL 25   < >  --    CREATININE mg/dL 0 65   < >  --    GLUCOSE, ISTAT mg/dl  --   --  97   CALCIUM mg/dL 8 6   < >  --     < > = values in this interval not displayed               Medications:    Current Facility-Administered Medications:     acetaminophen (TYLENOL) tablet 325 mg, 325 mg, Oral, Q8H PRN, Niraj Prechtel, DO    ALPRAZolam (XANAX) tablet 0 25 mg, 0 25 mg, Oral, TID PRN, Luda Gil MD    aspirin chewable tablet 324 mg, 324 mg, Oral, BID, Rujul Hall, DO, 324 mg at 02/22/19 0829    atorvastatin (LIPITOR) tablet 40 mg, 40 mg, Oral, QPM, Rujul Hall, DO, 40 mg at 02/21/19 1756    carvedilol (COREG) tablet 3 125 mg, 3 125 mg, Oral, BID With Meals, Rujul Hall, DO    cephalexin (KEFLEX) capsule 500 mg, 500 mg, Oral, Q6H [address], Luda Gil MD, 500 mg at 02/22/19 1158    citalopram (CeleXA) tablet 10 mg, 10 mg, Oral, Daily, Niraj Prechtel, DO, 10 mg at 02/22/19 0828    clopidogrel (PLAVIX) tablet 75 mg, 75 mg, Oral, Daily, Niraj Prechtel, DO, 75 mg at 02/22/19 0829    colchicine (COLCRYS) tablet 1 2 mg, 1 2 mg, Oral, BID, Rujul Hall, DO, 1 2 mg at 02/22/19 0828    famotidine (PEPCID) tablet 20 mg, 20 mg, Oral, BID, Niraj Prechtel, DO, 20 mg at 02/22/19 0829    insulin glargine (LANTUS) subcutaneous injection 15 Units 0 15 mL, 15 Units, Subcutaneous, HS, Jamaal Peoples, DO    insulin lispro (HumaLOG) 100 units/mL subcutaneous injection 1-5 Units, 1-5 Units, Subcutaneous, HS, Niraj Wang, DO, 1 Units at 02/21/19 2250    insulin lispro (HumaLOG) 100 units/mL subcutaneous injection 1-6 Units, 1-6 Units, Subcutaneous, TID AC, 1 Units at 02/22/19 1158 **AND** Fingerstick Glucose (POCT), , , TID AC, Brian Comp, DO    lactobacillus acidophilus-bulgaricus [facility]) packet 1 packet, 1 packet, Oral, TID With Meals, Kelley Cortez MD, 1 packet at 02/22/19 1157    lisinopril (ZESTRIL) tablet 2 5 mg, 2 5 mg, Oral, Daily, Rujul Hall, DO    melatonin tablet 6 mg, 6 mg, Oral, HS, Kingston Grade, CRNP, 6 mg at 02/21/19 2209    nitroglycerin (NITROSTAT) SL tablet 0 4 mg, 0 4 mg, Sublingual, Q5 Min PRN, Niraj Wang, DO, 0 4 mg at 02/19/19 1237    Portions of the record may have been created with voice recognition software  Occasional wrong word or "sound a like" substitutions may have occurred due to the inherent limitations of voice recognition software  Read the chart carefully and recognize, using context, where substitutions have occurred

## 2019-02-22 NOTE — PROGRESS NOTES
Progress Note - Ilia Leung [de-identified] y o  female MRN: [id number]    Unit/Bed#: E4 -01 Encounter: [id number]    Assessment/Plan:    Acute MI   continue aspirin Lipitor Plavix Coreg and lisinopril and Cardiology follow-up    Acute combined HF  appears compensated continue Cardiology follow-up    Diabetes   continue Lantus at 12 units, a m  Glucose today 156 will increase Lantus to 15 units with sliding scale and ADA diet    Ambulatory dysfunction recommend rehab on discharge    UTI    complete course of Keflex    Dyslipidemia   continue statin for LDL control    Pericarditis   continue colchicine and follow up with Cardiology    Subjective:   Feels better, denies chest pain shortness of breath nausea vomiting diarrhea no fevers chills appetite is stable    Objective:     Vitals: Blood pressure 112/65, pulse 72, temperature (!) 97 3 °F (36 3 °C), temperature source Tympanic, resp  rate 18, height 5' (1 524 m), weight 75 4 kg (166 lb 3 6 oz), SpO2 97 %  ,Body mass index is 32 46 kg/m²  Results from last 7 days   Lab Units 02/22/19  0605  02/17/19  1827   WBC Thousand/uL 12 93*   < >  --    HEMOGLOBIN g/dL 11 7   < >  --    HEMATOCRIT % 37 4   < >  --    PLATELETS Thousands/uL 442*   < >  --    INR   --   --  1 17    < > = values in this interval not displayed  Results from last 7 days   Lab Units 02/21/19  0512  02/17/19  1715 02/17/19  1637   POTASSIUM mmol/L 3 7   < >  --  3 9   CHLORIDE mmol/L 105   < >  --  95*   CO2 mmol/L 24   < >  --  29   CO2, I-STAT mmol/L  --   --  27  --    BUN mg/dL 25   < >  --  58*   CREATININE mg/dL 0 65   < >  --  0 93   CALCIUM mg/dL 8 6   < >  --  8 4   ALK PHOS U/L  --   --   --  72   ALT U/L  --   --   --  32   AST U/L  --   --   --  148*   GLUCOSE, ISTAT mg/dl  --   --  97  --     < > = values in this interval not displayed         Scheduled Meds:    Current Facility-Administered Medications:  acetaminophen 325 mg Oral Q8H PRN Niraj Wang, DO   ALPRAZolam 0 25 mg Oral TID PRN Bigg Marie MD   aspirin 324 mg Oral BID Rujul Hall, DO   atorvastatin 40 mg Oral QPM Rujul Hall, DO   carvedilol 3 125 mg Oral BID With Meals Ruladonnal Lynn Pérezat, DO   cephalexin 500 mg Oral Q6H Cesar Bean MD   citalopram 10 mg Oral Daily Niraj Prechtel, DO   clopidogrel 75 mg Oral Daily Niraj Prechtel, DO   colchicine 1 2 mg Oral BID Rujul Hall, DO   famotidine 20 mg Oral BID Niraj Prechtel, DO   insulin glargine 12 Units Subcutaneous HS Mary Fat, CRNP   insulin lispro 1-5 Units Subcutaneous HS Niraj Prechtel, DO   insulin lispro 1-6 Units Subcutaneous TID AC Niraj Prechtel, DO   lactobacillus acidophilus-bulgaricus 1 packet Oral TID With Meals Bigg Marie MD   lisinopril 2 5 mg Oral Daily Rujul Hall, DO   melatonin 6 mg Oral HS Aurther Mura, CRNP   morphine injection 1 mg Intravenous Q2H PRN Bigg Marie MD   nitroglycerin 0 4 mg Sublingual Q5 Min PRN Niraj Prechtel, DO       Continuous Infusions:     Physical exam:  General appearance:  Alert no distress interaction appropriate elderly   Head/Eyes:  Nonicteric PERRL EOMI  Neck:  Supple  Lungs:  Decreased BS bilateral no wheezing rhonchi or rales  Heart: normal S1 S2 regular  Abdomen: Soft nontender with bowel sounds  Extremities: no edema  Skin: no rash    Invasive Devices     Peripheral Intravenous Line            Peripheral IV 02/22/19 Left Forearm less than 1 day                Counseling / Coordination of Care  Total floor / unit time spent today  30   minutes  Greater than 50% of total time was spent with the patient and / or family counseling and / or coordination of care    A description of the counseling / coordination of care:

## 2019-02-22 NOTE — PROGRESS NOTES
Progress Note - Lor Segura [de-identified] y o  female MRN: [id number]    Unit/Bed#: E4 -01 Encounter: [id number]      Assessment/Plan:  1-acute ST elevation anterior wall myocardial infarction:  Patient presented with chest pain, EKG changes, and positive troponin at 39  She has had stuttering intermittent chest pain since that time  She is being followed by the cardiology service  -patient was evaluated for cardiac catheterization:  She had initially refused cardiac catheterization  Per the cardiology service, diagnosed with post-transmural infarction pericarditis:              -was noted that her EKG had Q-waves across the entire precordium persistent ST elevation, indicative of involving aneurysm  It was felt that there would be no benefit to be gained by reperfusion  She was not felt to be a surgical candidate  Medical therapy was recommended              -continue aspirin 324 mg daily, Lipitor 80 mg daily, Plavix 75 mg daily  -pt started on coreg and ACE today  -s/p IV heparin infusion     2-acute systolic/diastolic CHF:  Patient's 2D echocardiogram reveals an ejection fraction of 28%  She has akinesis of the basal mid anterior septal and apical walls  She has severe hypokinesis of the entire anterior and mid inferior septal walls  She has grade 1 diastolic dysfunction  -patient's acute systolic/diastolic congestive heart failure is likely secondary to her acute myocardial infarction  -per cardiology: does not require life vest               3-diabetes type 2:  With long-term use of insulin, without known complication:  Continue Lantus, sliding scale insulin, and Accu-Cheks  At home patient's Lantus doses 14 units daily, and metformin 500 mg b i d  -prior A1c was 6 4              -blood sugars today were [phone]     4-hypotension:  Patient has a previous history of hypertension, per previous records with her primary care provider    However she was not on any medications for this prior to admission  I reviewed her previous 2 notes from her primary care provider in 2643 and systolic blood pressure baseline appears to range 120-130s                 -initially had hypotension, likely secondary to acute ST-elevation myocardial infarction, an element of cardiogenic shock, and acute systolic congestive heart failure               -monitor bp with initiation of b-blocker and ACE- may need to dc or titrate meds      5-possible dementia:  Patient's primary care office note from 10/2018 notes a history of memory loss that has been stable  will need to cont to fu as outpt      6-anxiety:  Patient on Celexa at home      7-hypokalemia:  Repleted and normalized  Normal magnesium noted      8-UTI:  Patient complains of urinary discomfort, urinary frequency, and foul odor, which she stated was new since admission  Urinalysis is concerning for acute UTI   started on empiric abx  cx without discrete organism  Cont 3d keflex  Dc in tomorrow     9-leukocytosis:  Patient's initial will blood cell count was 18  However this improved to 11 9 without antibiotic intervention to this point  Her leukocytosis is likely secondary to stress response from her acute myocardial infarction  Patient also has a UTI    -cont to montior     10-post myocardial infarction pericarditis: pt has an audible rub  Was diagnosed with post-infarction pericarditis  -no significant effusion on echo              -avoid anti-inflammatory meds 7-10 days  No glucorticoids               -tylenol prn              -usually spontaneously resolved after few days   -rub currently gone     11-family: called [facility], friend  She has POA paperwork  Gave update    D/w pt's nurse on bedside rounds  D/w Dr Juana Duggan  D/w     Dispo:   May need inpt STR  Possible dc in next 24-48 depending on bp        VTE Pharmacologic Prophylaxis: Heparin  VTE Mechanical Prophylaxis: sequential compression device      Certification Statement: The patient will continue to require additional inpatient hospital stay due to need for further acute intervention for MI    Status: inpatient     ===================================================================    Subjective:  Pt notes that she feels well  She denies any chest pain/pressure  She denies any fever, chills, n/v/d  Tolerating po  Denies any sob/cough  Prior chest pain has completely resolved  Physical Exam:   Temp:  [96 7 °F (35 9 °C)-97 5 °F (36 4 °C)] 97 1 °F (36 2 °C)  HR:  [71-91] 74  Resp:  [18-20] 18  BP: ()/(53-75) 82/54    Gen:  Pleasant, non-tachypnic, non-dyspnic  Conversant  Heart: regular rate and rhythm, S1S2 present, no murmur, rub or gallop  No rub today  Lungs: clear to ausculatation bilaterally  No wheezing, crackles, or rhonchi  No accessory muscle use or respiratory distress  Abd: soft, non-tender, non-distended  NABS, no guarding, rebound or peritoneal signs  Extremities: no clubbing, cyanosis or edema  2+pedal pulses bilaterally  Full range of motion  Neuro: awake, alert  Moving all 4 extremities  Skin: warm and dry: no petechiae, purpura and rash      LABS:   Results from last 7 days   Lab Units 02/21/19  0512 02/19/19  1321 02/18/19  0448   WBC Thousand/uL 13 34* 11 96* 12 42*   HEMOGLOBIN g/dL 11 1* 11 5 11 0*   HEMATOCRIT % 35 9 34 9 34 1*   PLATELETS Thousands/uL 363 349 316     Results from last 7 days   Lab Units 02/21/19  0512 02/19/19  0614 02/18/19  0448 02/17/19  1715   POTASSIUM mmol/L 3 7 3 5 2 9*  --    CHLORIDE mmol/L 105 102 101  --    CO2 mmol/L 24 24 26  --    CO2, I-STAT mmol/L  --   --   --  27   BUN mg/dL 25 32* 50*  --    CREATININE mg/dL 0 65 0 68 0 80  --    GLUCOSE, ISTAT mg/dl  --   --   --  97   CALCIUM mg/dL 8 6 8 2* 7 9*  --        Hospital Data:    2/18:  Chest x-ray:  No acute disease     2/17:  Influenza PCR:  Negative     2/18:  Urinalysis:  Negative nitrate, large leukocyte esterase, WBCs 10-20      Urine culture:  mixed contaminants            ---------------------------------------------------------------------------------------------------------------  This note has been constructed using a voice recognition system

## 2019-02-22 NOTE — OCCUPATIONAL THERAPY NOTE
Occupational Therapy Treatment Note:         02/22/19 0940   Restrictions/Precautions   Weight Bearing Precautions Per Order No   Other Precautions Fall Risk;Pain;Telemetry;Multiple lines;Cognitive; Chair Alarm; Bed Alarm   Pain Assessment   Pain Assessment No/denies pain   Pain Score No Pain   Pain Type Acute pain   Pain Location Chest   Pain Orientation Left   ADL   Where Assessed Edge of bed   Grooming Assistance 5  Supervision/Setup   Grooming Deficit Setup   UB Bathing Assistance 5  Supervision/Setup   UB Bathing Deficit Setup   LB Bathing Assistance 5  Supervision/Setup   LB Bathing Deficit Setup   UB Dressing Assistance 5  Supervision/Setup   UB Dressing Deficit Setup   LB Dressing Assistance 5  Supervision/Setup   LB Dressing Deficit Setup;Steadying; Requires assistive device for steadying; Increased time to complete; Don/doff L sock; Don/doff R sock; Thread RLE into underwear;Pull up over hips; Thread LLE into underwear   LB Dressing Comments No LOB noted with activity  Toileting Assistance  5  Supervision/Setup   Toileting Deficit Setup;Steadying;Verbal cueing;Supervison/safety; Increased time to complete;Clothing management up;Clothing management down;Perineal hygiene; Bedside commode   Functional Standing Tolerance   Time 5 mins   Activity dynamic stand balance activity  Comments Pt without LOB noted or further increase in fatigue levels  Bed Mobility   Supine to Sit 5  Supervision   Additional items Assist x 1   Sit to Supine 5  Supervision   Additional items Assist x 1   Transfers   Sit to Stand 5  Supervision   Additional items Assist x 1   Stand to Sit 5  Supervision   Additional items Assist x 1   Stand pivot 5  Supervision   Additional items Assist x 1   Toilet transfer 5  Supervision   Additional items Assist x 1   Additional Comments Pt without LOB noted      Functional Mobility   Functional Mobility 5  Supervision   Additional Comments x1   Additional items Rolling walker   Toilet Transfers Toilet Transfer From [employer] Transfer Type To and from   Toilet Transfer to [employer] Transfer Technique Stand pivot; Ambulating   Toilet Transfers Verbal cues; Supervision   Toilet Transfers Comments No LOB noted with activity  Cognition   Overall Cognitive Status Impaired   Arousal/Participation Responsive   Attention Difficulty attending to directions   Orientation Level Oriented to person;Oriented to place   Memory Decreased short term memory;Decreased recall of precautions;Decreased recall of recent events   Following Commands Follows multistep commands with increased time or repetition   Comments Pt will benefit from daily checks based on ACLS score of 4 4   Cognition Assessment Tools ACLS   Score 4 4   Additional Activities   Additional Activities Other (Comment)  (Reviewed home safety and fall prevention  )   Additional Activities Comments PT reports having good understanding  Activity Tolerance   Activity Tolerance Patient limited by fatigue   Medical Staff Made Aware Reported all findings to JESUS Palm  Pt declined further OOB positioning due to noted fatigue levels  Assessment   Assessment Pt was seen for skilled OT with focus on bed mobility, functional transfers, review of RW safety, LE dressing, energy conservation and completion of the Samir Cognitive Level Screening  Pt scored 4 4/6 0 on the ACLS indicating need for daily assistance  See score indicators below  Pt without LOB with functional tasks  Able to complete self care tasks without LOB and appropriate safety noted  See above levels of A required for all functional tasks  Pt benefit from further rehab pending progressionof current goals as well as daily assistance  Plan   Treatment Interventions ADL retraining;Functional transfer training; Endurance training;Cognitive reorientation   Goal Expiration Date 03/07/19   Treatment Day 1   OT Frequency 3-5x/wk   Recommendation   OT Discharge Recommendation Short Term Rehab Barthel Index   Feeding 10   Bathing 0   Grooming Score 5   Dressing Score 5   Bladder Score 10   Bowels Score 10   Toilet Use Score 5   Transfers (Bed/Chair) Score 10   Mobility (Level Surface) Score 0   Stairs Score 0   Barthel Index Score 55   Modified [place] Scale   Modified [place] Scale 4   4 4    Administered Samir Cognitive Level Screen (ACLS)  Pt scored 4 4/6 0 indicating it is recommended that the person live with someone who does a daily check on the environment to remove any safety hazards and solve problems when minor changes in the home occur  The person may be alone for part of the day with a pre-established procedure for getting help from a neighbor  Behavior:  Knows day/date  Follows routine sequence of activities  Will learn schedule and follow daily routine  Hazards are not anticipated  Memorization is slow  Will need long term repetitive training for all new tasks  May become upset if routine is altered  May seek assist if lost   Do not expect to be aware of needs of others  May need reminders to keep appointments  Grooming:  Adams, brushes and styles hair  Applies makeup  May insist on familiar products  Finds supplies in familiar locations  May be unable to master use of new tools  Hazards are not anticipated  Dressing: Finds garments in familiar locations  Initiates dressing at usual times  Selects familiar combinations of outfits and may wear same outfit over and over  Resists new combinations  May fail to consider weather conditions, season or occasion when selecting clothing  May argue with suggested corrections of errors  Bathing:  Initiates bath/shower at customary times and follows typical routine  May collect supplies from a familiar location  May not vary rate  May want to use same products  May use excessive amounts of product  May have difficulty opening small or unusual containers  May leave towels on floor    Protect from unseen hazards (wet floors, electrical appliances near water)  Walking/exercising:  Ambulates within fitness capacity in neighborhood  Chooses to go by familiar routes  May fail to attend to signs or activities outside visual field  Needs new route identified by others and may learn after several weeks of practice  May become anxious in high stimulus environments (malls, airports, casinos)  Eating: May notice and clean highly visible dropped food items  May not be able to eat and converse at the same time  May resist changes in diet and menu  Watch handling of hot foods/liquids and heavy items  Toileting: Follows a routine of toileting in a familiar environment  Needs to have public rest rooms pointed out  May use too much paper  Does not consider needs of others  May spend excessive time in rest room  Medications: May follow routine rigidly and resist changes in prescriptions based on erroneous beliefs of effects  Does not note adverse side effects  Does not anticipate need to renew prescriptions  May be able to open child proof containers  Can use DAILY pill box marked with day/time (filled by another person)  Use of adaptive equipment:  May be trained to use adaptive equipment that requires a sequence of familiar actions  Does not anticipate hazards  Once learned, may resist changes in equipment  May abandon use of assistive device or use in unsafe manner  Housekeeping:  Sweeps, polishes and puts away objects to match previous performance  Fails to see dirt or dust in corners  May use excessive amounts of , polish or water  May resist changes in routine or products used  May fail to differentiate between similar cleaning products  Food preparation:  May follow a fixed diet and go hungry if usual food items are not available  Does not check inventory and may run out of essentials frequently  Does not consider nutritional needs  Goes to familiar restaurants of grocery stores   Is able to prepare simple hot/cold dishes  Spending money:  Manages routine purchases for immediate needs  May count cash very slowly  May use credit cards or checks  May need assistance for making monthly budget  May not remember to account for taxes or tips  Can manage a daily allowance  Shopping:  Goes to familiar stores for routine items  Does not compare product prices or quality  May not consider cost of item in relation to overall budget  May overspend  Laundry:  Initiates and completes laundry routine at usual time  May sort items by color  May follow schedule rigidly  May forget to clean lint traps  May forget to turn iron off  Traveling:  Needs new routes and travel procedures identified by others  May express interest in driving a car but fails to attend to all environmental cues to do so safely  May not allow adequate time for travel  Telephone:  Writes down messages and new numbers slowly  May attempt to use an address book  May make calls at odd hours without consideration of others schedule or cost of call  May run up large telephone bills  Driving: Should NOT operate a motor vehicle        CRUZ [place]

## 2019-02-22 NOTE — PLAN OF CARE
Problem: PHYSICAL THERAPY ADULT  Goal: Performs mobility at highest level of function for planned discharge setting  See evaluation for individualized goals  Description  Treatment/Interventions: Functional transfer training, LE strengthening/ROM, Therapeutic exercise, Endurance training, Patient/family training, Equipment eval/education, Bed mobility, Gait training, Compensatory technique education, Continued evaluation, Spoke to nursing, Spoke to case management, Spoke to MD  Equipment Recommended: Polina Miranda)       See flowsheet documentation for full assessment, interventions and recommendations  Note:   Prognosis: Good  Problem List: Decreased strength, Decreased endurance, Impaired balance, Decreased mobility, Impaired judgement, Decreased cognition, Decreased safety awareness  Assessment: Pt is [de-identified] y/o female admitted with acute MI, acute combined CHF, UTI, pericarditis  PT consulted  Up with assistance  Prior to admission resides alone in apt with elevator  Receives daily support from friend who lives in building and is POA  Pt is pleasant, however is limited historian given decreased cognition  States in apt does not use AD, using RW outside of apt  Denies falls  Unable to recall name of apt building or address for same  Oriented to person and place  S for bed mobility  Stacie for transfers with cues for safety with transitions and hand placement  At times impulsive with mobiltiy  Amb with RW with Stacie  Cues to slow pace as increased speed noted  Flexed posture, decreased foot clearance and BARBOZA noted  RA O2 sat 91-94% with activity  Cues needed for pacing and recognizing signs of fatigue  Given impairments in strength, balance, activity tolerance and mobility with need for cues with safety, will benefit from ongoing skilled PT in order to optimize outcomes    At this time may benefit from STR prior to home alone in order to reduce risk for falls and assist with return to independent level of function as lives alone  Barriers to Discharge Comments: lives alone  Recommendation: Short-term skilled PT     PT - OK to Discharge: (yes to rhb)    See flowsheet documentation for full assessment

## 2019-02-22 NOTE — PLAN OF CARE
Problem: OCCUPATIONAL THERAPY ADULT  Goal: Performs self-care activities at highest level of function for planned discharge setting  See evaluation for individualized goals  Description  Treatment Interventions: ADL retraining, Functional transfer training, UE strengthening/ROM, Endurance training, Cognitive reorientation, Patient/family training, Equipment evaluation/education, Compensatory technique education, Energy conservation, Activityengagement          See flowsheet documentation for full assessment, interventions and recommendations  Outcome: Progressing  Note:   Limitation: Decreased ADL status, Decreased UE strength, Decreased Safe judgement during ADL, Decreased cognition, Decreased endurance, Decreased high-level ADLs, Decreased self-care trans  Prognosis: Good  Assessment: Pt was seen for skilled OT with focus on bed mobility, functional transfers, review of RW safety, LE dressing, energy conservation and completion of the Maegan Falter Cognitive Level Screening  Pt scored 4 4/6 0 on the ACLS indicating need for daily assistance  See score indicators below  Pt without LOB with functional tasks  Able to complete self care tasks without LOB and appropriate safety noted  See above levels of A required for all functional tasks  Pt benefit from further rehab pending progressionof current goals as well as daily assistance        OT Discharge Recommendation: Short Term Rehab

## 2019-02-22 NOTE — PLAN OF CARE
Problem: Potential for Falls  Goal: Patient will remain free of falls  Description  INTERVENTIONS:  - Assess patient frequently for physical needs  -  Identify cognitive and physical deficits and behaviors that affect risk of falls    -  [place] fall precautions as indicated by assessment   - Educate patient/family on patient safety including physical limitations  - Instruct patient to call for assistance with activity based on assessment  - Modify environment to reduce risk of injury  - Consider OT/PT consult to assist with strengthening/mobility  Outcome: Progressing     Problem: Prexisting or High Potential for Compromised Skin Integrity  Goal: Skin integrity is maintained or improved  Description  INTERVENTIONS:  - Identify patients at risk for skin breakdown  - Assess and monitor skin integrity  - Assess and monitor nutrition and hydration status  - Monitor labs (i e  albumin)  - Assess for incontinence   - Turn and reposition patient  - Assist with mobility/ambulation  - Relieve pressure over bony prominences  - Avoid friction and shearing  - Provide appropriate hygiene as needed including keeping skin clean and dry  - Evaluate need for skin moisturizer/barrier cream  - Collaborate with interdisciplinary team (i e  Nutrition, Rehabilitation, etc )   - Patient/family teaching  Outcome: Progressing     Problem: PAIN - ADULT  Goal: Verbalizes/displays adequate comfort level or baseline comfort level  Description  Interventions:  - Encourage patient to monitor pain and request assistance  - Assess pain using appropriate pain scale  - Administer analgesics based on type and severity of pain and evaluate response  - Implement non-pharmacological measures as appropriate and evaluate response  - Consider cultural and social influences on pain and pain management  - Notify physician/advanced practitioner if interventions unsuccessful or patient reports new pain  Outcome: Progressing     Problem: SAFETY ADULT  Goal: Maintain or return to baseline ADL function  Description  INTERVENTIONS:  -  Assess patient's ability to carry out ADLs; assess patient's baseline for ADL function and identify physical deficits which impact ability to perform ADLs (bathing, care of mouth/teeth, toileting, grooming, dressing, etc )  - Assess/evaluate cause of self-care deficits   - Assess range of motion  - Assess patient's mobility; develop plan if impaired  - Assess patient's need for assistive devices and provide as appropriate  - Encourage maximum independence but intervene and supervise when necessary  ¯ Involve family in performance of ADLs  ¯ Assess for home care needs following discharge   ¯ Request OT consult to assist with ADL evaluation and planning for discharge  ¯ Provide patient education as appropriate  Outcome: Progressing  Goal: Maintain or return mobility status to optimal level  Description  INTERVENTIONS:  - Assess patient's baseline mobility status (ambulation, transfers, stairs, etc )    - Identify cognitive and physical deficits and behaviors that affect mobility  - Identify mobility aids required to assist with transfers and/or ambulation (gait belt, sit-to-stand, lift, walker, cane, etc )  - Given fall precautions as indicated by assessment  - Record patient progress and toleration of activity level on Mobility SBAR; progress patient to next Phase/Stage  - Instruct patient to call for assistance with activity based on assessment  - Request Rehabilitation consult to assist with strengthening/weightbearing, etc   Outcome: Progressing     Problem: DISCHARGE PLANNING  Goal: Discharge to home or other facility with appropriate resources  Description  INTERVENTIONS:  - Identify barriers to discharge w/patient and caregiver  - Arrange for needed discharge resources and transportation as appropriate  - Identify discharge learning needs (meds, wound care, etc )  - Arrange for interpretive services to assist at discharge as needed  - Refer to Case Management Department for coordinating discharge planning if the patient needs post-hospital services based on physician/advanced practitioner order or complex needs related to functional status, cognitive ability, or social support system  Outcome: Progressing     Problem: Knowledge Deficit  Goal: Patient/family/caregiver demonstrates understanding of disease process, treatment plan, medications, and discharge instructions  Description  Complete learning assessment and assess knowledge base  Interventions:  - Provide teaching at level of understanding  - Provide teaching via preferred learning methods  Outcome: Progressing     Problem: CARDIOVASCULAR - ADULT  Goal: Maintains optimal cardiac output and hemodynamic stability  Description  INTERVENTIONS:  - Monitor I/O, vital signs and rhythm  - Monitor for S/S and trends of decreased cardiac output i e  bleeding, hypotension  - Assess quality of pulses, skin color and temperature  - Assess for signs of decreased coronary artery perfusion - ex   Angina  - Instruct patient to report change in severity of symptoms   Outcome: Progressing  Goal: Absence of cardiac dysrhythmias or at baseline rhythm  Description  INTERVENTIONS:  - Continuous cardiac monitoring, monitor vital signs, obtain 12 lead EKG if indicated  - Administer antiarrhythmic and heart rate control medications as ordered  - Monitor electrolytes and administer replacement therapy as ordered  Outcome: Progressing     Problem: SKIN/TISSUE INTEGRITY - ADULT  Goal: Skin integrity remains intact  Description  INTERVENTIONS  - Identify patients at risk for skin breakdown  - Assess and monitor skin integrity  - Assess and monitor nutrition and hydration status  - Assess for incontinence   - Turn and reposition patient  - Assist with mobility/ambulation  - Relieve pressure over bony prominences  - Avoid friction and shearing  - Provide appropriate hygiene as needed including keeping skin clean and dry  - Evaluate need for skin moisturizer/barrier cream  - Collaborate with interdisciplinary team (i e  Nutrition, Rehabilitation, etc )   - Patient/family teaching   Outcome: Progressing  Goal: Incision(s), wounds(s) or drain site(s) healing without S/S of infection  Description  INTERVENTIONS  - Assess and document risk factors for skin impairment   - Assess and document dressing, incision, wound bed, drain sites and surrounding tissue  - Initiate Nutrition services consult and/or wound management as needed  Outcome: Progressing  Goal: Oral mucous membranes remain intact  Outcome: Progressing     Problem: INFECTION - ADULT  Goal: Absence or prevention of progression during hospitalization  Description  INTERVENTIONS:  - Assess and monitor for signs and symptoms of infection  - Monitor lab/diagnostic results  - Monitor all insertion sites, i e  indwelling lines, tubes, and drains  - Monitor endotracheal (as able) and nasal secretions for changes in amount and color  - [place] appropriate cooling/warming therapies per order  - Administer medications as ordered  - Instruct and encourage patient and family to use good hand hygiene technique  - Identify and instruct in appropriate isolation precautions for identified infection/condition  Outcome: Progressing  Goal: Absence of fever/infection during neutropenic period  Description  INTERVENTIONS:  - Monitor WBC  - Implement neutropenic guidelines  Outcome: Progressing     Problem: NEUROSENSORY - ADULT  Goal: Achieves stable or improved neurological status  Description  INTERVENTIONS  - Monitor and report changes in neurological status  - Initiate measures to prevent increased intracranial pressure  - Maintain blood pressure and fluid volume within ordered parameters to optimize cerebral perfusion  - Monitor temperature, glucose, and sodium or any other associated labs   Initiate appropriate interventions as ordered  - Monitor for seizure activity   - Administer anti-seizure medications as ordered  Outcome: Progressing  Goal: Absence of seizures  Description  INTERVENTIONS  - Monitor for seizure activity  - Administer anti-seizure medications as ordered  - Monitor neurological status  Outcome: Progressing  Goal: Remains free of injury related to seizures activity  Description  INTERVENTIONS  - Maintain airway, patient safety  and administer oxygen as ordered  - Monitor patient for seizure activity, document and report duration and description of seizure to physician/advanced practitioner  - If seizure occurs,  ensure patient safety during seizure  - Reorient patient post seizure  - Seizure pads on all 4 side rails  - Instruct patient/family to notify RN of any seizure activity including if an aura is experienced  - Instruct patient/family to call for assistance with activity based on nursing assessment  - Administer anti-seizure medications as ordered  - Monitor fetal well being  Outcome: Progressing  Goal: Achieves maximal functionality and self care  Description  INTERVENTIONS  - Monitor swallowing and airway patency with patient fatigue and changes in neurological status  - Encourage and assist patient to increase activity and self care with guidance from rehab services  - Encourage visually impaired, hearing impaired and aphasic patients to use assistive/communication devices  Outcome: Progressing     Problem: RESPIRATORY - ADULT  Goal: Achieves optimal ventilation and oxygenation  Description  INTERVENTIONS:  - Assess for changes in respiratory status  - Assess for changes in mentation and behavior  - Position to facilitate oxygenation and minimize respiratory effort  - Oxygen administration by appropriate delivery method based on oxygen saturation (per order) or ABGs  - Initiate smoking cessation education as indicated  - Encourage broncho-pulmonary hygiene including cough, deep breathe, Incentive Spirometry  - Assess the need for suctioning and aspirate as needed  - Assess and instruct to report SOB or any respiratory difficulty  - Respiratory Therapy support as indicated  Outcome: Progressing     Problem: GENITOURINARY - ADULT  Goal: Maintains or returns to baseline urinary function  Description  INTERVENTIONS:  - Assess urinary function  - Encourage oral fluids to ensure adequate hydration  - Administer IV fluids as ordered to ensure adequate hydration  - Administer ordered medications as needed  - Offer frequent toileting  - Follow urinary retention protocol if ordered  Outcome: Progressing  Goal: Absence of urinary retention  Description  INTERVENTIONS:  - Assess patient?s ability to void and empty bladder  - Monitor I/O  - Bladder scan as needed  - Discuss with physician/AP medications to alleviate retention as needed  - Discuss catheterization for long term situations as appropriate  Outcome: Progressing  Goal: Urinary catheter remains patent  Description  INTERVENTIONS:  - Assess patency of urinary catheter  - If patient has a chronic vang, consider changing catheter if non-functioning  - Follow guidelines for intermittent irrigation of non-functioning urinary catheter  Outcome: Progressing     Problem: DISCHARGE PLANNING - CARE MANAGEMENT  Goal: Discharge to post-acute care or home with appropriate resources  Description  INTERVENTIONS:    RN reports friend [employer] brought in [place] paperwork listing her as POA for pt  Discharge plan at this time is home pending PT eval      CM will f/u with POA papers and discharge needs for pt         - Conduct assessment to determine patient/family and health care team treatment goals, and need for post-acute services based on payer coverage, community resources, and patient preferences, and barriers to discharge  - Address psychosocial, clinical, and financial barriers to discharge as identified in assessment in conjunction with the patient/family and health care team  - Arrange appropriate level of post-acute services according to patient's   needs and preference and payer coverage in collaboration with the physician and health care team  - Communicate with and update the patient/family, physician, and health care team regarding progress on the discharge plan  - Arrange appropriate transportation to post-acute venues    Outcome: Progressing

## 2019-02-22 NOTE — UTILIZATION REVIEW
Continued Stay Review    Date: 2/22/2019  Vital Signs: /65 (BP Location: Right arm)   Pulse 72   Temp (!) 97 3 °F (36 3 °C) (Tympanic)   Resp 18   Ht 5' (1 524 m)   Wt 75 4 kg (166 lb 3 6 oz)   SpO2 97%   BMI 32 46 kg/m²   Assessment/Plan: dm- changing dose of lantus  Complete course of keflex  Acute hf - appears compensated continue with cardiology  Medications:   Scheduled Meds:   Current Facility-Administered Medications:  acetaminophen 325 mg Oral Q8H PRN Niraj Prechtel, DO   ALPRAZolam 0 25 mg Oral TID PRN Timothy Dobbins MD   aspirin 324 mg Oral BID Rujul Hall, DO   atorvastatin 40 mg Oral QPM Rujul Hall, DO   carvedilol 3 125 mg Oral BID With Meals Rujul Hall, DO   cephalexin 500 mg Oral Q6H Laura Tyson MD   citalopram 10 mg Oral Daily Niraj Prechtel, DO   clopidogrel 75 mg Oral Daily Niraj Prechtel, DO   colchicine 1 2 mg Oral BID Rujul Hall, DO   famotidine 20 mg Oral BID Niraj Prechtel, DO   insulin glargine 15 Units Subcutaneous HS [facility], DO   insulin lispro 1-5 Units Subcutaneous HS Niraj Prechtel, DO   insulin lispro 1-6 Units Subcutaneous TID AC Niraj Prechtel, DO   lactobacillus acidophilus-bulgaricus 1 packet Oral TID With Meals Timothy Dobbins MD   lisinopril 2 5 mg Oral Daily Rujul Hall, DO   melatonin 6 mg Oral HS STORM Dailey   nitroglycerin 0 4 mg Sublingual Q5 Min PRN Niraj Prechtel, DO     Continuous Infusions:    PRN Meds:   acetaminophen    ALPRAZolam    nitroglycerin  Pertinent Labs/Diagnostic Results: wbc 12 93--platlets 442  Age/Sex: [de-identified] y o  female   Discharge Plan: recommending str

## 2019-02-22 NOTE — PHYSICAL THERAPY NOTE
PT EVALUATION    [de-identified] y o     [id number]    Weakness [R53 1]  Nausea & vomiting [R11 2]  STEMI (ST elevation myocardial infarction) ([facility] ) [I21 3]  Elevated troponin [R74 8]    Past Medical History:   Diagnosis Date    Arthritis     [facility] mellitus ([facility]     Hypertension     Kidney failure     Memory loss     Retention of urine     Sepsis ([facility] )          Past Surgical History:   Procedure Laterality Date    OTHER SURGICAL HISTORY      states "can't recall"        02/22/19 1505   Note Type   Note type Eval only   Pain Assessment   Pain Score No Pain   Home Living   Type of Home Apartment   Home Layout One level;Elevator   Bathroom Shower/Tub Tub/shower unit   Bathroom Toilet Standard   Bathroom Equipment Grab bars in shower;Commode; Shower chair   Bathroom Accessibility Accessible   Home Equipment Walker;Grab bars; Reacher   Additional Comments resides alone in apt building-unable to report name or address of buidling alone, but close friend who lives on same floor makes daily checks  Prior Function   Level of [place] Independent with ADLs and functional mobility; Needs assistance with IADLs   Lives With Alone   Receives Help From Friend(s)  (reports neighbor and friend only support )   ADL Assistance Independent   IADLs Needs assistance   Falls in the last 6 months 0  (denies)   Vocational Retired   Comments Reports independent without use of AD in apt, using RW outside of apt  States independence, but friend/neighbor helps with medications, laundry, cleaning and sees she gets something to eat per her report  Admits to poor memory and limited historian given cognition  Restrictions/Precautions   Weight Bearing Precautions Per Order No   Other Precautions Fall Risk;Cognitive; Chair Alarm; Bed Alarm;Telemetry   General   Additional Pertinent History Pt is [de-identified] y/o female admitted with MI, CHF, UTI, paricarditis  PT consulted    Up with assist    Family/Caregiver Present No   Cognition   Overall Cognitive Status Impaired   Attention Difficulty attending to directions   Orientation Level Oriented to person;Oriented to place   Following Commands Follows one step commands without difficulty   RUE Assessment   RUE Assessment WFL  (grossly 4/5)   LUE Assessment   LUE Assessment WFL  (grossly 4/5)   RLE Assessment   RLE Assessment WFL  (grossly 4/5)   LLE Assessment   LLE Assessment WFL  (grossly 4/5)   Bed Mobility   Supine to Sit 5  Supervision   Additional items Increased time required;Assist x 1   Sit to Supine 5  Supervision   Additional items Assist x 1   Transfers   Sit to Stand 4  Minimal assistance   Additional items Assist x 1; Impulsive  (cues for safety with hand placement)   Stand to Sit 4  Minimal assistance   Additional items Assist x 1;Verbal cues  (cues for safety with hand placement)   Additional Comments cues for hand placement for safety  Cues to keep RW with her upon approach to bed  Ambulation/Elevation   Gait pattern Decreased foot clearance; Forward Flexion; Inconsistent prateek  (variable pace  At times quick with cues to slow for safety)   Gait Assistance 4  Minimal assist   Additional items Assist x 1;Verbal cues; Tactile cues   Assistive Device Rolling walker   Distance Amb with RW 70'x2  Cues for safety and for safe pace as at times ambulates with quick pace  BARBOZA noted  RA O2 sats 91-94%   Balance   Static Sitting Good   Dynamic Sitting Fair +   Static Standing Fair   Dynamic Standing [address] -  (with walker)   Endurance Deficit   Endurance Deficit Yes   Endurance Deficit Description BARBOZA, fatigue  SAts on RA 91-94%   Activity Tolerance   Activity Tolerance Patient limited by fatigue;Patient limited by pain   Medical Staff Made Aware NurseElizabeth  Nurse Made Aware yes   Assessment   Prognosis Good   Problem List Decreased strength;Decreased endurance; Impaired balance;Decreased mobility; Impaired judgement;Decreased cognition;Decreased safety awareness   Assessment Pt is [de-identified] y/o female admitted with acute MI, acute combined CHF, UTI, pericarditis  PT consulted  Up with assistance  Prior to admission resides alone in apt with elevator  Receives daily support from friend who lives in building and is POA  Pt is pleasant, however is limited historian given decreased cognition  States in apt does not use AD, using RW outside of apt  Denies falls  Unable to recall name of apt building or address for same  Oriented to person and place  S for bed mobility  Stacie for transfers with cues for safety with transitions and hand placement  At times impulsive with mobiltiy  Amb with RW with Satcie  Cues to slow pace as increased speed noted  Flexed posture, decreased foot clearance and BARBOZA noted  RA O2 sat 91-94% with activity  Cues needed for pacing and recognizing signs of fatigue  Given impairments in strength, balance, activity tolerance and mobility with need for cues with safety, will benefit from ongoing skilled PT in order to optimize outcomes  At this time may benefit from STR prior to home alone in order to reduce risk for falls and assist with return to independent level of function as lives alone  Barriers to Discharge Comments lives alone   Goals   Patient Goals to go home   STG Expiration Date 03/04/19   Short Term Goal #1 10 days: 1)  Pt will perform bed mobility with Allyson demonstrating appropriate technique 100% of the time in order to improve function  2)  Perform all transfers with Allyson demonstrating safe and appropriate technique 100% of the time in order to improve ability to negotiate safely in home environment  3) Amb with least restrictive AD > 200'x1 with mod I in order to demonstrate ability to negotiate in home environment  4)  Improve overall strength and balance 1/2 grade in order to optimize ability to perform functional tasks and reduce fall risk  5) Increase activity tolerance to 45 minutes in order to improve endurance to functional tasks  6) PT for ongoing patient and family/caregiver education, DME needs and d/c planning in order to promote highest level of function in least restrictive environment  Treatment Day 0   Plan   Treatment/Interventions Functional transfer training;LE strengthening/ROM; Therapeutic exercise; Endurance training;Patient/family training;Equipment eval/education; Bed mobility;Gait training; Compensatory technique education;Continued evaluation;Spoke to nursing;Spoke to case management;Spoke to MD   PT Frequency Other (Comment)  (4-5x/wk)   Recommendation   Recommendation Short-term skilled PT   Equipment Recommended Walker  (RW)   PT - OK to Discharge   (yes to rhb)   Modified Sharkey Scale   Modified Arhtur Scale 4   Barthel Index   Feeding 10   Bathing 0   Grooming Score 5   Dressing Score 5   Bladder Score 10   Bowels Score 10   Toilet Use Score 5   Transfers (Bed/Chair) Score 10   Mobility (Level Surface) Score 0   Stairs Score 0   Barthel Index Score 55     History: co - morbidities, fall risk, use of assistive device,cognition, lives alone  Exam: impairments in locomotion, musculoskeletal, balance,posture,  cardiac, cognition, barthel 55  Clinical: unstable/unpredictable ( fall risk, cognition, bed/chair alarm)  Complexity:high    Ala Sport, PT

## 2019-02-23 NOTE — PLAN OF CARE
Problem: Potential for Falls  Goal: Patient will remain free of falls  Description  INTERVENTIONS:  - Assess patient frequently for physical needs  -  Identify cognitive and physical deficits and behaviors that affect risk of falls    -  [place] fall precautions as indicated by assessment   - Educate patient/family on patient safety including physical limitations  - Instruct patient to call for assistance with activity based on assessment  - Modify environment to reduce risk of injury  - Consider OT/PT consult to assist with strengthening/mobility  Outcome: Progressing     Problem: Prexisting or High Potential for Compromised Skin Integrity  Goal: Skin integrity is maintained or improved  Description  INTERVENTIONS:  - Identify patients at risk for skin breakdown  - Assess and monitor skin integrity  - Assess and monitor nutrition and hydration status  - Monitor labs (i e  albumin)  - Assess for incontinence   - Turn and reposition patient  - Assist with mobility/ambulation  - Relieve pressure over bony prominences  - Avoid friction and shearing  - Provide appropriate hygiene as needed including keeping skin clean and dry  - Evaluate need for skin moisturizer/barrier cream  - Collaborate with interdisciplinary team (i e  Nutrition, Rehabilitation, etc )   - Patient/family teaching  Outcome: Progressing     Problem: PAIN - ADULT  Goal: Verbalizes/displays adequate comfort level or baseline comfort level  Description  Interventions:  - Encourage patient to monitor pain and request assistance  - Assess pain using appropriate pain scale  - Administer analgesics based on type and severity of pain and evaluate response  - Implement non-pharmacological measures as appropriate and evaluate response  - Consider cultural and social influences on pain and pain management  - Notify physician/advanced practitioner if interventions unsuccessful or patient reports new pain  Outcome: Progressing     Problem: SAFETY ADULT  Goal: Maintain or return to baseline ADL function  Description  INTERVENTIONS:  -  Assess patient's ability to carry out ADLs; assess patient's baseline for ADL function and identify physical deficits which impact ability to perform ADLs (bathing, care of mouth/teeth, toileting, grooming, dressing, etc )  - Assess/evaluate cause of self-care deficits   - Assess range of motion  - Assess patient's mobility; develop plan if impaired  - Assess patient's need for assistive devices and provide as appropriate  - Encourage maximum independence but intervene and supervise when necessary  ¯ Involve family in performance of ADLs  ¯ Assess for home care needs following discharge   ¯ Request OT consult to assist with ADL evaluation and planning for discharge  ¯ Provide patient education as appropriate  Outcome: Progressing  Goal: Maintain or return mobility status to optimal level  Description  INTERVENTIONS:  - Assess patient's baseline mobility status (ambulation, transfers, stairs, etc )    - Identify cognitive and physical deficits and behaviors that affect mobility  - Identify mobility aids required to assist with transfers and/or ambulation (gait belt, sit-to-stand, lift, walker, cane, etc )  - [place] fall precautions as indicated by assessment  - Record patient progress and toleration of activity level on Mobility SBAR; progress patient to next Phase/Stage  - Instruct patient to call for assistance with activity based on assessment  - Request Rehabilitation consult to assist with strengthening/weightbearing, etc   Outcome: Progressing     Problem: DISCHARGE PLANNING  Goal: Discharge to home or other facility with appropriate resources  Description  INTERVENTIONS:  - Identify barriers to discharge w/patient and caregiver  - Arrange for needed discharge resources and transportation as appropriate  - Identify discharge learning needs (meds, wound care, etc )  - Arrange for interpretive services to assist at discharge as needed  - Refer to Case Management Department for coordinating discharge planning if the patient needs post-hospital services based on physician/advanced practitioner order or complex needs related to functional status, cognitive ability, or social support system  Outcome: Progressing     Problem: Knowledge Deficit  Goal: Patient/family/caregiver demonstrates understanding of disease process, treatment plan, medications, and discharge instructions  Description  Complete learning assessment and assess knowledge base  Interventions:  - Provide teaching at level of understanding  - Provide teaching via preferred learning methods  Outcome: Progressing     Problem: CARDIOVASCULAR - ADULT  Goal: Maintains optimal cardiac output and hemodynamic stability  Description  INTERVENTIONS:  - Monitor I/O, vital signs and rhythm  - Monitor for S/S and trends of decreased cardiac output i e  bleeding, hypotension  - Assess quality of pulses, skin color and temperature  - Assess for signs of decreased coronary artery perfusion - ex   Angina  - Instruct patient to report change in severity of symptoms   Outcome: Progressing  Goal: Absence of cardiac dysrhythmias or at baseline rhythm  Description  INTERVENTIONS:  - Continuous cardiac monitoring, monitor vital signs, obtain 12 lead EKG if indicated  - Administer antiarrhythmic and heart rate control medications as ordered  - Monitor electrolytes and administer replacement therapy as ordered  Outcome: Progressing     Problem: SKIN/TISSUE INTEGRITY - ADULT  Goal: Skin integrity remains intact  Description  INTERVENTIONS  - Identify patients at risk for skin breakdown  - Assess and monitor skin integrity  - Assess and monitor nutrition and hydration status  - Assess for incontinence   - Turn and reposition patient  - Assist with mobility/ambulation  - Relieve pressure over bony prominences  - Avoid friction and shearing  - Provide appropriate hygiene as needed including keeping skin clean and dry  - Evaluate need for skin moisturizer/barrier cream  - Collaborate with interdisciplinary team (i e  Nutrition, Rehabilitation, etc )   - Patient/family teaching   Outcome: Progressing  Goal: Incision(s), wounds(s) or drain site(s) healing without S/S of infection  Description  INTERVENTIONS  - Assess and document risk factors for skin impairment   - Assess and document dressing, incision, wound bed, drain sites and surrounding tissue  - Initiate Nutrition services consult and/or wound management as needed  Outcome: Progressing  Goal: Oral mucous membranes remain intact  Outcome: Progressing     Problem: INFECTION - ADULT  Goal: Absence or prevention of progression during hospitalization  Description  INTERVENTIONS:  - Assess and monitor for signs and symptoms of infection  - Monitor lab/diagnostic results  - Monitor all insertion sites, i e  indwelling lines, tubes, and drains  - Monitor endotracheal (as able) and nasal secretions for changes in amount and color  - Ramer appropriate cooling/warming therapies per order  - Administer medications as ordered  - Instruct and encourage patient and family to use good hand hygiene technique  - Identify and instruct in appropriate isolation precautions for identified infection/condition  Outcome: Progressing  Goal: Absence of fever/infection during neutropenic period  Description  INTERVENTIONS:  - Monitor WBC  - Implement neutropenic guidelines  Outcome: Progressing     Problem: NEUROSENSORY - ADULT  Goal: Achieves stable or improved neurological status  Description  INTERVENTIONS  - Monitor and report changes in neurological status  - Initiate measures to prevent increased intracranial pressure  - Maintain blood pressure and fluid volume within ordered parameters to optimize cerebral perfusion  - Monitor temperature, glucose, and sodium or any other associated labs   Initiate appropriate interventions as ordered  - Monitor for seizure activity   - Administer anti-seizure medications as ordered  Outcome: Progressing  Goal: Absence of seizures  Description  INTERVENTIONS  - Monitor for seizure activity  - Administer anti-seizure medications as ordered  - Monitor neurological status  Outcome: Progressing  Goal: Remains free of injury related to seizures activity  Description  INTERVENTIONS  - Maintain airway, patient safety  and administer oxygen as ordered  - Monitor patient for seizure activity, document and report duration and description of seizure to physician/advanced practitioner  - If seizure occurs,  ensure patient safety during seizure  - Reorient patient post seizure  - Seizure pads on all 4 side rails  - Instruct patient/family to notify RN of any seizure activity including if an aura is experienced  - Instruct patient/family to call for assistance with activity based on nursing assessment  - Administer anti-seizure medications as ordered  - Monitor fetal well being  Outcome: Progressing  Goal: Achieves maximal functionality and self care  Description  INTERVENTIONS  - Monitor swallowing and airway patency with patient fatigue and changes in neurological status  - Encourage and assist patient to increase activity and self care with guidance from rehab services  - Encourage visually impaired, hearing impaired and aphasic patients to use assistive/communication devices  Outcome: Progressing     Problem: RESPIRATORY - ADULT  Goal: Achieves optimal ventilation and oxygenation  Description  INTERVENTIONS:  - Assess for changes in respiratory status  - Assess for changes in mentation and behavior  - Position to facilitate oxygenation and minimize respiratory effort  - Oxygen administration by appropriate delivery method based on oxygen saturation (per order) or ABGs  - Initiate smoking cessation education as indicated  - Encourage broncho-pulmonary hygiene including cough, deep breathe, Incentive Spirometry  - Assess the need for suctioning and aspirate as needed  - Assess and instruct to report SOB or any respiratory difficulty  - Respiratory Therapy support as indicated  Outcome: Progressing     Problem: GENITOURINARY - ADULT  Goal: Maintains or returns to baseline urinary function  Description  INTERVENTIONS:  - Assess urinary function  - Encourage oral fluids to ensure adequate hydration  - Administer IV fluids as ordered to ensure adequate hydration  - Administer ordered medications as needed  - Offer frequent toileting  - Follow urinary retention protocol if ordered  Outcome: Progressing  Goal: Absence of urinary retention  Description  INTERVENTIONS:  - Assess patient?s ability to void and empty bladder  - Monitor I/O  - Bladder scan as needed  - Discuss with physician/AP medications to alleviate retention as needed  - Discuss catheterization for long term situations as appropriate  Outcome: Progressing  Goal: Urinary catheter remains patent  Description  INTERVENTIONS:  - Assess patency of urinary catheter  - If patient has a chronic vang, consider changing catheter if non-functioning  - Follow guidelines for intermittent irrigation of non-functioning urinary catheter  Outcome: Progressing     Problem: DISCHARGE PLANNING - CARE MANAGEMENT  Goal: Discharge to post-acute care or home with appropriate resources  Description  INTERVENTIONS:    PT/OT is recommending short stay rehab  [facility] to [facility] and her first choice is Son and then [facility] and [facility]  Referral were made  Insurance will need to auth stay  CM will f/u   RN reports friend Karen Sen brought in [place] paperwork listing her as POA for pt  Discharge plan at this time is home pending PT eval      CM will f/u with POA papers and discharge needs for pt         - Conduct assessment to determine patient/family and health care team treatment goals, and need for post-acute services based on payer coverage, community resources, and patient preferences, and barriers to discharge  - Address psychosocial, clinical, and financial barriers to discharge as identified in assessment in conjunction with the patient/family and health care team  - Arrange appropriate level of post-acute services according to patient's   needs and preference and payer coverage in collaboration with the physician and health care team  - Communicate with and update the patient/family, physician, and health care team regarding progress on the discharge plan  - Arrange appropriate transportation to post-acute venues     Outcome: Progressing

## 2019-02-23 NOTE — PROGRESS NOTES
Progress Note - Lor Segura [de-identified] y o  female MRN: [id number]    Unit/Bed#: E4 -01 Encounter: [id number]    Assessment/Plan:    Acute MI   now stable continue aspirin Lipitor Plavix Coreg lisinopril monitor with Cardiology     Ambulatory dysfunction recommending short-term rehab    Acute combined HF  NOW COMPENSATED CONTINUE COREG LISINOPRIL no diuretic with relative low blood pressure    Diabetes   continue Lantus sliding scale and ADA diet     Pericarditis   continue colchicine and cardiology follow-up    Dyslipidemia   continue statin for LDL control    Subjective:   Feels the same, denies chest pain shortness of breath nausea vomiting diarrhea no fever chills appetite stable    Objective:     Vitals: Blood pressure 100/59, pulse 88, temperature (!) 97 3 °F (36 3 °C), temperature source Tympanic, resp  rate 19, height 5' (1 524 m), weight 75 4 kg (166 lb 3 6 oz), SpO2 93 %  ,Body mass index is 32 46 kg/m²  Results from last 7 days   Lab Units 02/22/19  0605  02/17/19  1827   WBC Thousand/uL 12 93*   < >  --    HEMOGLOBIN g/dL 11 7   < >  --    HEMATOCRIT % 37 4   < >  --    PLATELETS Thousands/uL 442*   < >  --    INR   --   --  1 17    < > = values in this interval not displayed  Results from last 7 days   Lab Units 02/21/19  0512  02/17/19  1715 02/17/19  1637   POTASSIUM mmol/L 3 7   < >  --  3 9   CHLORIDE mmol/L 105   < >  --  95*   CO2 mmol/L 24   < >  --  29   CO2, I-STAT mmol/L  --   --  27  --    BUN mg/dL 25   < >  --  58*   CREATININE mg/dL 0 65   < >  --  0 93   CALCIUM mg/dL 8 6   < >  --  8 4   ALK PHOS U/L  --   --   --  72   ALT U/L  --   --   --  32   AST U/L  --   --   --  148*   GLUCOSE, ISTAT mg/dl  --   --  97  --     < > = values in this interval not displayed         Scheduled Meds:    Current Facility-Administered Medications:  acetaminophen 325 mg Oral Q8H PRN Niraj Wang DO   ALPRAZolam 0 25 mg Oral TID PRN Demario Hayes MD   aspirin 81 mg Oral Daily Mychal Rowley MD Mindy   atorvastatin 20 mg Oral QPM Saumya Castillo MD   carvedilol 3 125 mg Oral BID With Meals Rumilad Howardiligilbert Rosales, DO   citalopram 10 mg Oral Daily Niraj Prechtel, DO   clopidogrel 75 mg Oral Daily Niraj Prechtel, DO   famotidine 20 mg Oral BID Niraj Prechtel, DO   insulin glargine 15 Units Subcutaneous HS [place] Chard, DO   insulin lispro 1-5 Units Subcutaneous HS Niraj Prechtel, DO   insulin lispro 1-6 Units Subcutaneous TID AC Niraj Prechtel, DO   lactobacillus acidophilus-bulgaricus 1 packet Oral TID With Meals Zainab Christian MD   lisinopril 2 5 mg Oral Daily Rujul Hall, DO   melatonin 6 mg Oral HS STORM Wilson   nitroglycerin 0 4 mg Sublingual Q5 Min PRN Niraj Prechtel, DO       Continuous Infusions:     Physical exam:  General appearance:  Alert noted stress interaction appropriate   Head/Eyes:  Nonicteric PERRL EOMI  Neck:  Supple  Lungs:  Decreased BS bilateral no wheezing rhonchi or rales  Heart: normal S1 S2 regular  Abdomen: Soft nontender with bowel sounds  Extremities: no edema  Skin: no rash    Invasive Devices     Peripheral Intravenous Line            Peripheral IV 02/22/19 Left Forearm 1 day                  Counseling / Coordination of Care  Total floor / unit time spent today  30   minutes  Greater than 50% of total time was spent with the patient and / or family counseling and / or coordination of care    A description of the counseling / coordination of care:

## 2019-02-23 NOTE — PLAN OF CARE
Problem: Potential for Falls  Goal: Patient will remain free of falls  Description  INTERVENTIONS:  - Assess patient frequently for physical needs  -  Identify cognitive and physical deficits and behaviors that affect risk of falls    -  [place] fall precautions as indicated by assessment   - Educate patient/family on patient safety including physical limitations  - Instruct patient to call for assistance with activity based on assessment  - Modify environment to reduce risk of injury  - Consider OT/PT consult to assist with strengthening/mobility  Outcome: Progressing     Problem: Prexisting or High Potential for Compromised Skin Integrity  Goal: Skin integrity is maintained or improved  Description  INTERVENTIONS:  - Identify patients at risk for skin breakdown  - Assess and monitor skin integrity  - Assess and monitor nutrition and hydration status  - Monitor labs (i e  albumin)  - Assess for incontinence   - Turn and reposition patient  - Assist with mobility/ambulation  - Relieve pressure over bony prominences  - Avoid friction and shearing  - Provide appropriate hygiene as needed including keeping skin clean and dry  - Evaluate need for skin moisturizer/barrier cream  - Collaborate with interdisciplinary team (i e  Nutrition, Rehabilitation, etc )   - Patient/family teaching  Outcome: Progressing     Problem: PAIN - ADULT  Goal: Verbalizes/displays adequate comfort level or baseline comfort level  Description  Interventions:  - Encourage patient to monitor pain and request assistance  - Assess pain using appropriate pain scale  - Administer analgesics based on type and severity of pain and evaluate response  - Implement non-pharmacological measures as appropriate and evaluate response  - Consider cultural and social influences on pain and pain management  - Notify physician/advanced practitioner if interventions unsuccessful or patient reports new pain  Outcome: Progressing     Problem: SAFETY ADULT  Goal: Maintain or return to baseline ADL function  Description  INTERVENTIONS:  -  Assess patient's ability to carry out ADLs; assess patient's baseline for ADL function and identify physical deficits which impact ability to perform ADLs (bathing, care of mouth/teeth, toileting, grooming, dressing, etc )  - Assess/evaluate cause of self-care deficits   - Assess range of motion  - Assess patient's mobility; develop plan if impaired  - Assess patient's need for assistive devices and provide as appropriate  - Encourage maximum independence but intervene and supervise when necessary  ¯ Involve family in performance of ADLs  ¯ Assess for home care needs following discharge   ¯ Request OT consult to assist with ADL evaluation and planning for discharge  ¯ Provide patient education as appropriate  Outcome: Progressing  Goal: Maintain or return mobility status to optimal level  Description  INTERVENTIONS:  - Assess patient's baseline mobility status (ambulation, transfers, stairs, etc )    - Identify cognitive and physical deficits and behaviors that affect mobility  - Identify mobility aids required to assist with transfers and/or ambulation (gait belt, sit-to-stand, lift, walker, cane, etc )  - [place] fall precautions as indicated by assessment  - Record patient progress and toleration of activity level on Mobility SBAR; progress patient to next Phase/Stage  - Instruct patient to call for assistance with activity based on assessment  - Request Rehabilitation consult to assist with strengthening/weightbearing, etc   Outcome: Progressing     Problem: DISCHARGE PLANNING  Goal: Discharge to home or other facility with appropriate resources  Description  INTERVENTIONS:  - Identify barriers to discharge w/patient and caregiver  - Arrange for needed discharge resources and transportation as appropriate  - Identify discharge learning needs (meds, wound care, etc )  - Arrange for interpretive services to assist at discharge as needed  - Refer to Case Management Department for coordinating discharge planning if the patient needs post-hospital services based on physician/advanced practitioner order or complex needs related to functional status, cognitive ability, or social support system  Outcome: Progressing     Problem: Knowledge Deficit  Goal: Patient/family/caregiver demonstrates understanding of disease process, treatment plan, medications, and discharge instructions  Description  Complete learning assessment and assess knowledge base  Interventions:  - Provide teaching at level of understanding  - Provide teaching via preferred learning methods  Outcome: Progressing     Problem: CARDIOVASCULAR - ADULT  Goal: Maintains optimal cardiac output and hemodynamic stability  Description  INTERVENTIONS:  - Monitor I/O, vital signs and rhythm  - Monitor for S/S and trends of decreased cardiac output i e  bleeding, hypotension  - Assess quality of pulses, skin color and temperature  - Assess for signs of decreased coronary artery perfusion - ex   Angina  - Instruct patient to report change in severity of symptoms   Outcome: Progressing  Goal: Absence of cardiac dysrhythmias or at baseline rhythm  Description  INTERVENTIONS:  - Continuous cardiac monitoring, monitor vital signs, obtain 12 lead EKG if indicated  - Administer antiarrhythmic and heart rate control medications as ordered  - Monitor electrolytes and administer replacement therapy as ordered  Outcome: Progressing     Problem: SKIN/TISSUE INTEGRITY - ADULT  Goal: Skin integrity remains intact  Description  INTERVENTIONS  - Identify patients at risk for skin breakdown  - Assess and monitor skin integrity  - Assess and monitor nutrition and hydration status  - Assess for incontinence   - Turn and reposition patient  - Assist with mobility/ambulation  - Relieve pressure over bony prominences  - Avoid friction and shearing  - Provide appropriate hygiene as needed including keeping skin clean and dry  - Evaluate need for skin moisturizer/barrier cream  - Collaborate with interdisciplinary team (i e  Nutrition, Rehabilitation, etc )   - Patient/family teaching   Outcome: Progressing     Problem: INFECTION - ADULT  Goal: Absence or prevention of progression during hospitalization  Description  INTERVENTIONS:  - Assess and monitor for signs and symptoms of infection  - Monitor lab/diagnostic results  - Monitor all insertion sites, i e  indwelling lines, tubes, and drains  - Monitor endotracheal (as able) and nasal secretions for changes in amount and color  - [place] appropriate cooling/warming therapies per order  - Administer medications as ordered  - Instruct and encourage patient and family to use good hand hygiene technique  - Identify and instruct in appropriate isolation precautions for identified infection/condition  Outcome: Progressing  Goal: Absence of fever/infection during neutropenic period  Description  INTERVENTIONS:  - Monitor WBC  - Implement neutropenic guidelines  Outcome: Progressing     Problem: NEUROSENSORY - ADULT  Goal: Achieves stable or improved neurological status  Description  INTERVENTIONS  - Monitor and report changes in neurological status  - Initiate measures to prevent increased intracranial pressure  - Maintain blood pressure and fluid volume within ordered parameters to optimize cerebral perfusion  - Monitor temperature, glucose, and sodium or any other associated labs   Initiate appropriate interventions as ordered  - Monitor for seizure activity   - Administer anti-seizure medications as ordered  Outcome: Progressing     Problem: GENITOURINARY - ADULT  Goal: Maintains or returns to baseline urinary function  Description  INTERVENTIONS:  - Assess urinary function  - Encourage oral fluids to ensure adequate hydration  - Administer IV fluids as ordered to ensure adequate hydration  - Administer ordered medications as needed  - Offer frequent toileting  - Follow urinary retention protocol if ordered  Outcome: Progressing     Problem: DISCHARGE PLANNING - CARE MANAGEMENT  Goal: Discharge to post-acute care or home with appropriate resources  Description  INTERVENTIONS:    PT/OT is recommending short stay rehab  TC to [facility] and her first choice is [facility] and then [facility] and [employer]  Referral were made  Insurance will need to auth stay  CM will f/u   RN reports friend Amy Dailey brought in [place] paperwork listing her as POA for pt  Discharge plan at this time is home pending PT eval      CM will f/u with POA papers and discharge needs for pt         - Conduct assessment to determine patient/family and health care team treatment goals, and need for post-acute services based on payer coverage, community resources, and patient preferences, and barriers to discharge  - Address psychosocial, clinical, and financial barriers to discharge as identified in assessment in conjunction with the patient/family and health care team  - Arrange appropriate level of post-acute services according to patient's   needs and preference and payer coverage in collaboration with the physician and health care team  - Communicate with and update the patient/family, physician, and health care team regarding progress on the discharge plan  - Arrange appropriate transportation to post-acute venues     Outcome: Progressing

## 2019-02-24 NOTE — PLAN OF CARE
Problem: Potential for Falls  Goal: Patient will remain free of falls  Description  INTERVENTIONS:  - Assess patient frequently for physical needs  -  Identify cognitive and physical deficits and behaviors that affect risk of falls    -  [place] fall precautions as indicated by assessment   - Educate patient/family on patient safety including physical limitations  - Instruct patient to call for assistance with activity based on assessment  - Modify environment to reduce risk of injury  - Consider OT/PT consult to assist with strengthening/mobility  Outcome: Progressing     Problem: Prexisting or High Potential for Compromised Skin Integrity  Goal: Skin integrity is maintained or improved  Description  INTERVENTIONS:  - Identify patients at risk for skin breakdown  - Assess and monitor skin integrity  - Assess and monitor nutrition and hydration status  - Monitor labs (i e  albumin)  - Assess for incontinence   - Turn and reposition patient  - Assist with mobility/ambulation  - Relieve pressure over bony prominences  - Avoid friction and shearing  - Provide appropriate hygiene as needed including keeping skin clean and dry  - Evaluate need for skin moisturizer/barrier cream  - Collaborate with interdisciplinary team (i e  Nutrition, Rehabilitation, etc )   - Patient/family teaching  Outcome: Progressing     Problem: PAIN - ADULT  Goal: Verbalizes/displays adequate comfort level or baseline comfort level  Description  Interventions:  - Encourage patient to monitor pain and request assistance  - Assess pain using appropriate pain scale  - Administer analgesics based on type and severity of pain and evaluate response  - Implement non-pharmacological measures as appropriate and evaluate response  - Consider cultural and social influences on pain and pain management  - Notify physician/advanced practitioner if interventions unsuccessful or patient reports new pain  Outcome: Progressing     Problem: SAFETY ADULT  Goal: Maintain or return to baseline ADL function  Description  INTERVENTIONS:  -  Assess patient's ability to carry out ADLs; assess patient's baseline for ADL function and identify physical deficits which impact ability to perform ADLs (bathing, care of mouth/teeth, toileting, grooming, dressing, etc )  - Assess/evaluate cause of self-care deficits   - Assess range of motion  - Assess patient's mobility; develop plan if impaired  - Assess patient's need for assistive devices and provide as appropriate  - Encourage maximum independence but intervene and supervise when necessary  ¯ Involve family in performance of ADLs  ¯ Assess for home care needs following discharge   ¯ Request OT consult to assist with ADL evaluation and planning for discharge  ¯ Provide patient education as appropriate  Outcome: Progressing  Goal: Maintain or return mobility status to optimal level  Description  INTERVENTIONS:  - Assess patient's baseline mobility status (ambulation, transfers, stairs, etc )    - Identify cognitive and physical deficits and behaviors that affect mobility  - Identify mobility aids required to assist with transfers and/or ambulation (gait belt, sit-to-stand, lift, walker, cane, etc )  - [place] fall precautions as indicated by assessment  - Record patient progress and toleration of activity level on Mobility SBAR; progress patient to next Phase/Stage  - Instruct patient to call for assistance with activity based on assessment  - Request Rehabilitation consult to assist with strengthening/weightbearing, etc   Outcome: Progressing     Problem: DISCHARGE PLANNING  Goal: Discharge to home or other facility with appropriate resources  Description  INTERVENTIONS:  - Identify barriers to discharge w/patient and caregiver  - Arrange for needed discharge resources and transportation as appropriate  - Identify discharge learning needs (meds, wound care, etc )  - Arrange for interpretive services to assist at discharge as needed  - Refer to Case Management Department for coordinating discharge planning if the patient needs post-hospital services based on physician/advanced practitioner order or complex needs related to functional status, cognitive ability, or social support system  Outcome: Progressing     Problem: Knowledge Deficit  Goal: Patient/family/caregiver demonstrates understanding of disease process, treatment plan, medications, and discharge instructions  Description  Complete learning assessment and assess knowledge base  Interventions:  - Provide teaching at level of understanding  - Provide teaching via preferred learning methods  Outcome: Progressing     Problem: CARDIOVASCULAR - ADULT  Goal: Maintains optimal cardiac output and hemodynamic stability  Description  INTERVENTIONS:  - Monitor I/O, vital signs and rhythm  - Monitor for S/S and trends of decreased cardiac output i e  bleeding, hypotension  - Assess quality of pulses, skin color and temperature  - Assess for signs of decreased coronary artery perfusion - ex   Angina  - Instruct patient to report change in severity of symptoms   Outcome: Progressing  Goal: Absence of cardiac dysrhythmias or at baseline rhythm  Description  INTERVENTIONS:  - Continuous cardiac monitoring, monitor vital signs, obtain 12 lead EKG if indicated  - Administer antiarrhythmic and heart rate control medications as ordered  - Monitor electrolytes and administer replacement therapy as ordered  Outcome: Progressing     Problem: SKIN/TISSUE INTEGRITY - ADULT  Goal: Skin integrity remains intact  Description  INTERVENTIONS  - Identify patients at risk for skin breakdown  - Assess and monitor skin integrity  - Assess and monitor nutrition and hydration status  - Assess for incontinence   - Turn and reposition patient  - Assist with mobility/ambulation  - Relieve pressure over bony prominences  - Avoid friction and shearing  - Provide appropriate hygiene as needed including keeping skin clean and dry  - Evaluate need for skin moisturizer/barrier cream  - Collaborate with interdisciplinary team (i e  Nutrition, Rehabilitation, etc )   - Patient/family teaching   Outcome: Progressing  Goal: Incision(s), wounds(s) or drain site(s) healing without S/S of infection  Description  INTERVENTIONS  - Assess and document risk factors for skin impairment   - Assess and document dressing, incision, wound bed, drain sites and surrounding tissue  - Initiate Nutrition services consult and/or wound management as needed  Outcome: Progressing  Goal: Oral mucous membranes remain intact  Outcome: Progressing     Problem: INFECTION - ADULT  Goal: Absence or prevention of progression during hospitalization  Description  INTERVENTIONS:  - Assess and monitor for signs and symptoms of infection  - Monitor lab/diagnostic results  - Monitor all insertion sites, i e  indwelling lines, tubes, and drains  - Monitor endotracheal (as able) and nasal secretions for changes in amount and color  - [place] appropriate cooling/warming therapies per order  - Administer medications as ordered  - Instruct and encourage patient and family to use good hand hygiene technique  - Identify and instruct in appropriate isolation precautions for identified infection/condition  Outcome: Progressing  Goal: Absence of fever/infection during neutropenic period  Description  INTERVENTIONS:  - Monitor WBC  - Implement neutropenic guidelines  Outcome: Progressing     Problem: NEUROSENSORY - ADULT  Goal: Achieves stable or improved neurological status  Description  INTERVENTIONS  - Monitor and report changes in neurological status  - Initiate measures to prevent increased intracranial pressure  - Maintain blood pressure and fluid volume within ordered parameters to optimize cerebral perfusion  - Monitor temperature, glucose, and sodium or any other associated labs   Initiate appropriate interventions as ordered  - Monitor for seizure activity   - Administer anti-seizure medications as ordered  Outcome: Progressing  Goal: Absence of seizures  Description  INTERVENTIONS  - Monitor for seizure activity  - Administer anti-seizure medications as ordered  - Monitor neurological status  Outcome: Progressing  Goal: Remains free of injury related to seizures activity  Description  INTERVENTIONS  - Maintain airway, patient safety  and administer oxygen as ordered  - Monitor patient for seizure activity, document and report duration and description of seizure to physician/advanced practitioner  - If seizure occurs,  ensure patient safety during seizure  - Reorient patient post seizure  - Seizure pads on all 4 side rails  - Instruct patient/family to notify RN of any seizure activity including if an aura is experienced  - Instruct patient/family to call for assistance with activity based on nursing assessment  - Administer anti-seizure medications as ordered  - Monitor fetal well being  Outcome: Progressing  Goal: Achieves maximal functionality and self care  Description  INTERVENTIONS  - Monitor swallowing and airway patency with patient fatigue and changes in neurological status  - Encourage and assist patient to increase activity and self care with guidance from rehab services  - Encourage visually impaired, hearing impaired and aphasic patients to use assistive/communication devices  Outcome: Progressing     Problem: RESPIRATORY - ADULT  Goal: Achieves optimal ventilation and oxygenation  Description  INTERVENTIONS:  - Assess for changes in respiratory status  - Assess for changes in mentation and behavior  - Position to facilitate oxygenation and minimize respiratory effort  - Oxygen administration by appropriate delivery method based on oxygen saturation (per order) or ABGs  - Initiate smoking cessation education as indicated  - Encourage broncho-pulmonary hygiene including cough, deep breathe, Incentive Spirometry  - Assess the need for suctioning and aspirate as needed  - Assess and instruct to report SOB or any respiratory difficulty  - Respiratory Therapy support as indicated  Outcome: Progressing     Problem: GENITOURINARY - ADULT  Goal: Maintains or returns to baseline urinary function  Description  INTERVENTIONS:  - Assess urinary function  - Encourage oral fluids to ensure adequate hydration  - Administer IV fluids as ordered to ensure adequate hydration  - Administer ordered medications as needed  - Offer frequent toileting  - Follow urinary retention protocol if ordered  Outcome: Progressing  Goal: Absence of urinary retention  Description  INTERVENTIONS:  - Assess patient?s ability to void and empty bladder  - Monitor I/O  - Bladder scan as needed  - Discuss with physician/AP medications to alleviate retention as needed  - Discuss catheterization for long term situations as appropriate  Outcome: Progressing  Goal: Urinary catheter remains patent  Description  INTERVENTIONS:  - Assess patency of urinary catheter  - If patient has a chronic vang, consider changing catheter if non-functioning  - Follow guidelines for intermittent irrigation of non-functioning urinary catheter  Outcome: Progressing     Problem: DISCHARGE PLANNING - CARE MANAGEMENT  Goal: Discharge to post-acute care or home with appropriate resources  Description  INTERVENTIONS:    PT/OT is recommending short stay rehab  TC to [facility] and her first choice is [facility] and then [facility] and [facility]  Referral were made  Insurance will need to auth stay  CM will f/u   RN reports friend Milad Miranda brought in [place] paperwork listing her as POA for pt  Discharge plan at this time is home pending PT eval      CM will f/u with POA papers and discharge needs for pt         - Conduct assessment to determine patient/family and health care team treatment goals, and need for post-acute services based on payer coverage, community resources, and patient preferences, and barriers to discharge  - Address psychosocial, clinical, and financial barriers to discharge as identified in assessment in conjunction with the patient/family and health care team  - Arrange appropriate level of post-acute services according to patient's   needs and preference and payer coverage in collaboration with the physician and health care team  - Communicate with and update the patient/family, physician, and health care team regarding progress on the discharge plan  - Arrange appropriate transportation to post-acute venues     Outcome: Progressing

## 2019-02-24 NOTE — PLAN OF CARE
Problem: Potential for Falls  Goal: Patient will remain free of falls  Description  INTERVENTIONS:  - Assess patient frequently for physical needs  -  Identify cognitive and physical deficits and behaviors that affect risk of falls    -  [place] fall precautions as indicated by assessment   - Educate patient/family on patient safety including physical limitations  - Instruct patient to call for assistance with activity based on assessment  - Modify environment to reduce risk of injury  - Consider OT/PT consult to assist with strengthening/mobility  Outcome: Progressing     Problem: Prexisting or High Potential for Compromised Skin Integrity  Goal: Skin integrity is maintained or improved  Description  INTERVENTIONS:  - Identify patients at risk for skin breakdown  - Assess and monitor skin integrity  - Assess and monitor nutrition and hydration status  - Monitor labs (i e  albumin)  - Assess for incontinence   - Turn and reposition patient  - Assist with mobility/ambulation  - Relieve pressure over bony prominences  - Avoid friction and shearing  - Provide appropriate hygiene as needed including keeping skin clean and dry  - Evaluate need for skin moisturizer/barrier cream  - Collaborate with interdisciplinary team (i e  Nutrition, Rehabilitation, etc )   - Patient/family teaching  Outcome: Progressing     Problem: PAIN - ADULT  Goal: Verbalizes/displays adequate comfort level or baseline comfort level  Description  Interventions:  - Encourage patient to monitor pain and request assistance  - Assess pain using appropriate pain scale  - Administer analgesics based on type and severity of pain and evaluate response  - Implement non-pharmacological measures as appropriate and evaluate response  - Consider cultural and social influences on pain and pain management  - Notify physician/advanced practitioner if interventions unsuccessful or patient reports new pain  Outcome: Progressing     Problem: SAFETY ADULT  Goal: Maintain or return to baseline ADL function  Description  INTERVENTIONS:  -  Assess patient's ability to carry out ADLs; assess patient's baseline for ADL function and identify physical deficits which impact ability to perform ADLs (bathing, care of mouth/teeth, toileting, grooming, dressing, etc )  - Assess/evaluate cause of self-care deficits   - Assess range of motion  - Assess patient's mobility; develop plan if impaired  - Assess patient's need for assistive devices and provide as appropriate  - Encourage maximum independence but intervene and supervise when necessary  ¯ Involve family in performance of ADLs  ¯ Assess for home care needs following discharge   ¯ Request OT consult to assist with ADL evaluation and planning for discharge  ¯ Provide patient education as appropriate  Outcome: Progressing  Goal: Maintain or return mobility status to optimal level  Description  INTERVENTIONS:  - Assess patient's baseline mobility status (ambulation, transfers, stairs, etc )    - Identify cognitive and physical deficits and behaviors that affect mobility  - Identify mobility aids required to assist with transfers and/or ambulation (gait belt, sit-to-stand, lift, walker, cane, etc )  - [place] fall precautions as indicated by assessment  - Record patient progress and toleration of activity level on Mobility SBAR; progress patient to next Phase/Stage  - Instruct patient to call for assistance with activity based on assessment  - Request Rehabilitation consult to assist with strengthening/weightbearing, etc   Outcome: Progressing     Problem: DISCHARGE PLANNING  Goal: Discharge to home or other facility with appropriate resources  Description  INTERVENTIONS:  - Identify barriers to discharge w/patient and caregiver  - Arrange for needed discharge resources and transportation as appropriate  - Identify discharge learning needs (meds, wound care, etc )  - Arrange for interpretive services to assist at discharge as needed  - Refer to Case Management Department for coordinating discharge planning if the patient needs post-hospital services based on physician/advanced practitioner order or complex needs related to functional status, cognitive ability, or social support system  Outcome: Progressing     Problem: Knowledge Deficit  Goal: Patient/family/caregiver demonstrates understanding of disease process, treatment plan, medications, and discharge instructions  Description  Complete learning assessment and assess knowledge base  Interventions:  - Provide teaching at level of understanding  - Provide teaching via preferred learning methods  Outcome: Progressing     Problem: CARDIOVASCULAR - ADULT  Goal: Maintains optimal cardiac output and hemodynamic stability  Description  INTERVENTIONS:  - Monitor I/O, vital signs and rhythm  - Monitor for S/S and trends of decreased cardiac output i e  bleeding, hypotension  - Assess quality of pulses, skin color and temperature  - Assess for signs of decreased coronary artery perfusion - ex   Angina  - Instruct patient to report change in severity of symptoms   Outcome: Progressing  Goal: Absence of cardiac dysrhythmias or at baseline rhythm  Description  INTERVENTIONS:  - Continuous cardiac monitoring, monitor vital signs, obtain 12 lead EKG if indicated  - Administer antiarrhythmic and heart rate control medications as ordered  - Monitor electrolytes and administer replacement therapy as ordered  Outcome: Progressing     Problem: SKIN/TISSUE INTEGRITY - ADULT  Goal: Skin integrity remains intact  Description  INTERVENTIONS  - Identify patients at risk for skin breakdown  - Assess and monitor skin integrity  - Assess and monitor nutrition and hydration status  - Assess for incontinence   - Turn and reposition patient  - Assist with mobility/ambulation  - Relieve pressure over bony prominences  - Avoid friction and shearing  - Provide appropriate hygiene as needed including keeping skin clean and dry  - Evaluate need for skin moisturizer/barrier cream  - Collaborate with interdisciplinary team (i e  Nutrition, Rehabilitation, etc )   - Patient/family teaching   Outcome: Progressing  Goal: Incision(s), wounds(s) or drain site(s) healing without S/S of infection  Description  INTERVENTIONS  - Assess and document risk factors for skin impairment   - Assess and document dressing, incision, wound bed, drain sites and surrounding tissue  - Initiate Nutrition services consult and/or wound management as needed  Outcome: Progressing  Goal: Oral mucous membranes remain intact  Outcome: Progressing     Problem: INFECTION - ADULT  Goal: Absence or prevention of progression during hospitalization  Description  INTERVENTIONS:  - Assess and monitor for signs and symptoms of infection  - Monitor lab/diagnostic results  - Monitor all insertion sites, i e  indwelling lines, tubes, and drains  - Monitor endotracheal (as able) and nasal secretions for changes in amount and color  - [place] appropriate cooling/warming therapies per order  - Administer medications as ordered  - Instruct and encourage patient and family to use good hand hygiene technique  - Identify and instruct in appropriate isolation precautions for identified infection/condition  Outcome: Progressing  Goal: Absence of fever/infection during neutropenic period  Description  INTERVENTIONS:  - Monitor WBC  - Implement neutropenic guidelines  Outcome: Progressing     Problem: NEUROSENSORY - ADULT  Goal: Achieves stable or improved neurological status  Description  INTERVENTIONS  - Monitor and report changes in neurological status  - Initiate measures to prevent increased intracranial pressure  - Maintain blood pressure and fluid volume within ordered parameters to optimize cerebral perfusion  - Monitor temperature, glucose, and sodium or any other associated labs   Initiate appropriate interventions as ordered  - Monitor for seizure activity   - Administer anti-seizure medications as ordered  Outcome: Progressing  Goal: Absence of seizures  Description  INTERVENTIONS  - Monitor for seizure activity  - Administer anti-seizure medications as ordered  - Monitor neurological status  Outcome: Progressing  Goal: Remains free of injury related to seizures activity  Description  INTERVENTIONS  - Maintain airway, patient safety  and administer oxygen as ordered  - Monitor patient for seizure activity, document and report duration and description of seizure to physician/advanced practitioner  - If seizure occurs,  ensure patient safety during seizure  - Reorient patient post seizure  - Seizure pads on all 4 side rails  - Instruct patient/family to notify RN of any seizure activity including if an aura is experienced  - Instruct patient/family to call for assistance with activity based on nursing assessment  - Administer anti-seizure medications as ordered  - Monitor fetal well being  Outcome: Progressing  Goal: Achieves maximal functionality and self care  Description  INTERVENTIONS  - Monitor swallowing and airway patency with patient fatigue and changes in neurological status  - Encourage and assist patient to increase activity and self care with guidance from rehab services  - Encourage visually impaired, hearing impaired and aphasic patients to use assistive/communication devices  Outcome: Progressing     Problem: RESPIRATORY - ADULT  Goal: Achieves optimal ventilation and oxygenation  Description  INTERVENTIONS:  - Assess for changes in respiratory status  - Assess for changes in mentation and behavior  - Position to facilitate oxygenation and minimize respiratory effort  - Oxygen administration by appropriate delivery method based on oxygen saturation (per order) or ABGs  - Initiate smoking cessation education as indicated  - Encourage broncho-pulmonary hygiene including cough, deep breathe, Incentive Spirometry  - Assess the need for suctioning and aspirate as needed  - Assess and instruct to report SOB or any respiratory difficulty  - Respiratory Therapy support as indicated  Outcome: Progressing     Problem: GENITOURINARY - ADULT  Goal: Maintains or returns to baseline urinary function  Description  INTERVENTIONS:  - Assess urinary function  - Encourage oral fluids to ensure adequate hydration  - Administer IV fluids as ordered to ensure adequate hydration  - Administer ordered medications as needed  - Offer frequent toileting  - Follow urinary retention protocol if ordered  Outcome: Progressing  Goal: Absence of urinary retention  Description  INTERVENTIONS:  - Assess patient?s ability to void and empty bladder  - Monitor I/O  - Bladder scan as needed  - Discuss with physician/AP medications to alleviate retention as needed  - Discuss catheterization for long term situations as appropriate  Outcome: Progressing  Goal: Urinary catheter remains patent  Description  INTERVENTIONS:  - Assess patency of urinary catheter  - If patient has a chronic vang, consider changing catheter if non-functioning  - Follow guidelines for intermittent irrigation of non-functioning urinary catheter  Outcome: Progressing     Problem: DISCHARGE PLANNING - CARE MANAGEMENT  Goal: Discharge to post-acute care or home with appropriate resources  Description  INTERVENTIONS:    PT/OT is recommending short stay rehab  TC to [facility] and her first choice is [facility] and then [facility] and [facility]  Referral were made  Insurance will need to auth stay  CM will f/u   RN reports friend Jason ramirez in [place] paperwork listing her as POA for pt  Discharge plan at this time is home pending PT eval      CM will f/u with POA papers and discharge needs for pt         - Conduct assessment to determine patient/family and health care team treatment goals, and need for post-acute services based on payer coverage, community resources, and patient preferences, and barriers to discharge  - Address psychosocial, clinical, and financial barriers to discharge as identified in assessment in conjunction with the patient/family and health care team  - Arrange appropriate level of post-acute services according to patient's   needs and preference and payer coverage in collaboration with the physician and health care team  - Communicate with and update the patient/family, physician, and health care team regarding progress on the discharge plan  - Arrange appropriate transportation to post-acute venues     Outcome: Progressing

## 2019-02-24 NOTE — PLAN OF CARE
Problem: Potential for Falls  Goal: Patient will remain free of falls  Description  INTERVENTIONS:  - Assess patient frequently for physical needs  -  Identify cognitive and physical deficits and behaviors that affect risk of falls    -  [place] fall precautions as indicated by assessment   - Educate patient/family on patient safety including physical limitations  - Instruct patient to call for assistance with activity based on assessment  - Modify environment to reduce risk of injury  - Consider OT/PT consult to assist with strengthening/mobility  Outcome: Progressing     Problem: Prexisting or High Potential for Compromised Skin Integrity  Goal: Skin integrity is maintained or improved  Description  INTERVENTIONS:  - Identify patients at risk for skin breakdown  - Assess and monitor skin integrity  - Assess and monitor nutrition and hydration status  - Monitor labs (i e  albumin)  - Assess for incontinence   - Turn and reposition patient  - Assist with mobility/ambulation  - Relieve pressure over bony prominences  - Avoid friction and shearing  - Provide appropriate hygiene as needed including keeping skin clean and dry  - Evaluate need for skin moisturizer/barrier cream  - Collaborate with interdisciplinary team (i e  Nutrition, Rehabilitation, etc )   - Patient/family teaching  Outcome: Progressing     Problem: PAIN - ADULT  Goal: Verbalizes/displays adequate comfort level or baseline comfort level  Description  Interventions:  - Encourage patient to monitor pain and request assistance  - Assess pain using appropriate pain scale  - Administer analgesics based on type and severity of pain and evaluate response  - Implement non-pharmacological measures as appropriate and evaluate response  - Consider cultural and social influences on pain and pain management  - Notify physician/advanced practitioner if interventions unsuccessful or patient reports new pain  Outcome: Progressing     Problem: SAFETY ADULT  Goal: Maintain or return to baseline ADL function  Description  INTERVENTIONS:  -  Assess patient's ability to carry out ADLs; assess patient's baseline for ADL function and identify physical deficits which impact ability to perform ADLs (bathing, care of mouth/teeth, toileting, grooming, dressing, etc )  - Assess/evaluate cause of self-care deficits   - Assess range of motion  - Assess patient's mobility; develop plan if impaired  - Assess patient's need for assistive devices and provide as appropriate  - Encourage maximum independence but intervene and supervise when necessary  ¯ Involve family in performance of ADLs  ¯ Assess for home care needs following discharge   ¯ Request OT consult to assist with ADL evaluation and planning for discharge  ¯ Provide patient education as appropriate  Outcome: Progressing  Goal: Maintain or return mobility status to optimal level  Description  INTERVENTIONS:  - Assess patient's baseline mobility status (ambulation, transfers, stairs, etc )    - Identify cognitive and physical deficits and behaviors that affect mobility  - Identify mobility aids required to assist with transfers and/or ambulation (gait belt, sit-to-stand, lift, walker, cane, etc )  - [place] fall precautions as indicated by assessment  - Record patient progress and toleration of activity level on Mobility SBAR; progress patient to next Phase/Stage  - Instruct patient to call for assistance with activity based on assessment  - Request Rehabilitation consult to assist with strengthening/weightbearing, etc   Outcome: Progressing     Problem: DISCHARGE PLANNING  Goal: Discharge to home or other facility with appropriate resources  Description  INTERVENTIONS:  - Identify barriers to discharge w/patient and caregiver  - Arrange for needed discharge resources and transportation as appropriate  - Identify discharge learning needs (meds, wound care, etc )  - Arrange for interpretive services to assist at discharge as needed  - Refer to Case Management Department for coordinating discharge planning if the patient needs post-hospital services based on physician/advanced practitioner order or complex needs related to functional status, cognitive ability, or social support system  Outcome: Progressing     Problem: Knowledge Deficit  Goal: Patient/family/caregiver demonstrates understanding of disease process, treatment plan, medications, and discharge instructions  Description  Complete learning assessment and assess knowledge base  Interventions:  - Provide teaching at level of understanding  - Provide teaching via preferred learning methods  Outcome: Progressing     Problem: CARDIOVASCULAR - ADULT  Goal: Maintains optimal cardiac output and hemodynamic stability  Description  INTERVENTIONS:  - Monitor I/O, vital signs and rhythm  - Monitor for S/S and trends of decreased cardiac output i e  bleeding, hypotension  - Assess quality of pulses, skin color and temperature  - Assess for signs of decreased coronary artery perfusion - ex   Angina  - Instruct patient to report change in severity of symptoms   Outcome: Progressing  Goal: Absence of cardiac dysrhythmias or at baseline rhythm  Description  INTERVENTIONS:  - Continuous cardiac monitoring, monitor vital signs, obtain 12 lead EKG if indicated  - Administer antiarrhythmic and heart rate control medications as ordered  - Monitor electrolytes and administer replacement therapy as ordered  Outcome: Progressing     Problem: SKIN/TISSUE INTEGRITY - ADULT  Goal: Skin integrity remains intact  Description  INTERVENTIONS  - Identify patients at risk for skin breakdown  - Assess and monitor skin integrity  - Assess and monitor nutrition and hydration status  - Assess for incontinence   - Turn and reposition patient  - Assist with mobility/ambulation  - Relieve pressure over bony prominences  - Avoid friction and shearing  - Provide appropriate hygiene as needed including keeping skin clean and dry  - Evaluate need for skin moisturizer/barrier cream  - Collaborate with interdisciplinary team (i e  Nutrition, Rehabilitation, etc )   - Patient/family teaching   Outcome: Progressing  Goal: Incision(s), wounds(s) or drain site(s) healing without S/S of infection  Description  INTERVENTIONS  - Assess and document risk factors for skin impairment   - Assess and document dressing, incision, wound bed, drain sites and surrounding tissue  - Initiate Nutrition services consult and/or wound management as needed  Outcome: Progressing  Goal: Oral mucous membranes remain intact  Outcome: Progressing     Problem: INFECTION - ADULT  Goal: Absence or prevention of progression during hospitalization  Description  INTERVENTIONS:  - Assess and monitor for signs and symptoms of infection  - Monitor lab/diagnostic results  - Monitor all insertion sites, i e  indwelling lines, tubes, and drains  - Monitor endotracheal (as able) and nasal secretions for changes in amount and color  - [place] appropriate cooling/warming therapies per order  - Administer medications as ordered  - Instruct and encourage patient and family to use good hand hygiene technique  - Identify and instruct in appropriate isolation precautions for identified infection/condition  Outcome: Progressing  Goal: Absence of fever/infection during neutropenic period  Description  INTERVENTIONS:  - Monitor WBC  - Implement neutropenic guidelines  Outcome: Progressing     Problem: NEUROSENSORY - ADULT  Goal: Achieves stable or improved neurological status  Description  INTERVENTIONS  - Monitor and report changes in neurological status  - Initiate measures to prevent increased intracranial pressure  - Maintain blood pressure and fluid volume within ordered parameters to optimize cerebral perfusion  - Monitor temperature, glucose, and sodium or any other associated labs   Initiate appropriate interventions as ordered  - Monitor for seizure activity   - Administer anti-seizure medications as ordered  Outcome: Progressing  Goal: Absence of seizures  Description  INTERVENTIONS  - Monitor for seizure activity  - Administer anti-seizure medications as ordered  - Monitor neurological status  Outcome: Progressing  Goal: Remains free of injury related to seizures activity  Description  INTERVENTIONS  - Maintain airway, patient safety  and administer oxygen as ordered  - Monitor patient for seizure activity, document and report duration and description of seizure to physician/advanced practitioner  - If seizure occurs,  ensure patient safety during seizure  - Reorient patient post seizure  - Seizure pads on all 4 side rails  - Instruct patient/family to notify RN of any seizure activity including if an aura is experienced  - Instruct patient/family to call for assistance with activity based on nursing assessment  - Administer anti-seizure medications as ordered  - Monitor fetal well being  Outcome: Progressing  Goal: Achieves maximal functionality and self care  Description  INTERVENTIONS  - Monitor swallowing and airway patency with patient fatigue and changes in neurological status  - Encourage and assist patient to increase activity and self care with guidance from rehab services  - Encourage visually impaired, hearing impaired and aphasic patients to use assistive/communication devices  Outcome: Progressing     Problem: RESPIRATORY - ADULT  Goal: Achieves optimal ventilation and oxygenation  Description  INTERVENTIONS:  - Assess for changes in respiratory status  - Assess for changes in mentation and behavior  - Position to facilitate oxygenation and minimize respiratory effort  - Oxygen administration by appropriate delivery method based on oxygen saturation (per order) or ABGs  - Initiate smoking cessation education as indicated  - Encourage broncho-pulmonary hygiene including cough, deep breathe, Incentive Spirometry  - Assess the need for suctioning and aspirate as needed  - Assess and instruct to report SOB or any respiratory difficulty  - Respiratory Therapy support as indicated  Outcome: Progressing     Problem: GENITOURINARY - ADULT  Goal: Maintains or returns to baseline urinary function  Description  INTERVENTIONS:  - Assess urinary function  - Encourage oral fluids to ensure adequate hydration  - Administer IV fluids as ordered to ensure adequate hydration  - Administer ordered medications as needed  - Offer frequent toileting  - Follow urinary retention protocol if ordered  Outcome: Progressing  Goal: Absence of urinary retention  Description  INTERVENTIONS:  - Assess patient?s ability to void and empty bladder  - Monitor I/O  - Bladder scan as needed  - Discuss with physician/AP medications to alleviate retention as needed  - Discuss catheterization for long term situations as appropriate  Outcome: Progressing  Goal: Urinary catheter remains patent  Description  INTERVENTIONS:  - Assess patency of urinary catheter  - If patient has a chronic vang, consider changing catheter if non-functioning  - Follow guidelines for intermittent irrigation of non-functioning urinary catheter  Outcome: Progressing     Problem: DISCHARGE PLANNING - CARE MANAGEMENT  Goal: Discharge to post-acute care or home with appropriate resources  Description  INTERVENTIONS:    PT/OT is recommending short stay rehab  TC to [facility] and her first choice is Son and then [facility] and [facility]  Referral were made  Insurance will need to auth stay  CM will f/u   RN reports friend Mariano Grimaldo brought in [place] paperwork listing her as POA for pt  Discharge plan at this time is home pending PT eval      CM will f/u with POA papers and discharge needs for pt         - Conduct assessment to determine patient/family and health care team treatment goals, and need for post-acute services based on payer coverage, community resources, and patient preferences, and barriers to discharge  - Address psychosocial, clinical, and financial barriers to discharge as identified in assessment in conjunction with the patient/family and health care team  - Arrange appropriate level of post-acute services according to patient's   needs and preference and payer coverage in collaboration with the physician and health care team  - Communicate with and update the patient/family, physician, and health care team regarding progress on the discharge plan  - Arrange appropriate transportation to post-acute venues     Outcome: Progressing

## 2019-02-24 NOTE — PROGRESS NOTES
Progress Note - Nicolas Sheets [de-identified] y o  female MRN: [id number]    Unit/Bed#: E4 -01 Encounter: [id number]    Assessment/Plan:    Acute MI   stable continue aspirin Lipitor Plavix Coreg lisinopril, monitor with Cardiology    Ambulatory dysfunction recommend short-term rehab    Acute combined HF  now compensated with Coreg lisinopril, unable to tolerate diuretic with low blood pressures    Diabetes   continue Lantus sliding scale and ADA diet    Dyslipidemia   continue statin for LDL control       Subjective:   Feels good offers no complaints denies chest pain shortness of breath nausea vomiting diarrhea no fevers chills appetite is okay    Objective:     Vitals: Blood pressure 118/70, pulse 67, temperature (!) 97 3 °F (36 3 °C), temperature source Tympanic, resp  rate 18, height 5' (1 524 m), weight 75 4 kg (166 lb 3 6 oz), SpO2 92 %  ,Body mass index is 32 46 kg/m²  Results from last 7 days   Lab Units 02/22/19  0605  02/17/19  1827   WBC Thousand/uL 12 93*   < >  --    HEMOGLOBIN g/dL 11 7   < >  --    HEMATOCRIT % 37 4   < >  --    PLATELETS Thousands/uL 442*   < >  --    INR   --   --  1 17    < > = values in this interval not displayed  Results from last 7 days   Lab Units 02/21/19  0512  02/17/19  1715 02/17/19  1637   POTASSIUM mmol/L 3 7   < >  --  3 9   CHLORIDE mmol/L 105   < >  --  95*   CO2 mmol/L 24   < >  --  29   CO2, I-STAT mmol/L  --   --  27  --    BUN mg/dL 25   < >  --  58*   CREATININE mg/dL 0 65   < >  --  0 93   CALCIUM mg/dL 8 6   < >  --  8 4   ALK PHOS U/L  --   --   --  72   ALT U/L  --   --   --  32   AST U/L  --   --   --  148*   GLUCOSE, ISTAT mg/dl  --   --  97  --     < > = values in this interval not displayed         Scheduled Meds:    Current Facility-Administered Medications:  acetaminophen 325 mg Oral Q8H PRN Niraj Wang DO   ALPRAZolam 0 25 mg Oral TID PRN Bigg Marie MD   aspirin 81 mg Oral Daily Brian Lozada MD   atorvastatin 20 mg Oral QPM Jody Walker MD Mindy   carvedilol 3 125 mg Oral BID With Meals Rujul Kimo Grabiel, DO   citalopram 10 mg Oral Daily Niraj Prechtel, DO   clopidogrel 75 mg Oral Daily Niraj Prechtel, DO   famotidine 20 mg Oral BID Niraj Prechtel, DO   insulin glargine 15 Units Subcutaneous HS Declan Welch, DO   insulin lispro 1-5 Units Subcutaneous HS Niraj Prechtel, DO   insulin lispro 1-6 Units Subcutaneous TID AC Niraj Prechtel, DO   lactobacillus acidophilus-bulgaricus 1 packet Oral TID With Meals Jennifer Michel MD   lisinopril 2 5 mg Oral Daily Rujul Hall, DO   melatonin 6 mg Oral HS Yarely Number, CRNP   nitroglycerin 0 4 mg Sublingual Q5 Min PRN Niraj Prechtel, DO       Continuous Infusions:     Physical exam:  General appearance:  Alert no distress interaction appropriate elderly   Head/Eyes:  Nonicteric PERRL EOMI  Neck:  Supple  Lungs:  Decreased BS bilateral no wheezing rhonchi or rales  Heart: normal S1 S2 regular  Abdomen: Soft nontender with bowel sounds  Extremities: no edema  Skin: no rash    Invasive Devices     Peripheral Intravenous Line            Peripheral IV 02/22/19 Left Forearm 2 days                      Counseling / Coordination of Care  Total floor / unit time spent today  30   minutes  Greater than 50% of total time was spent with the patient and / or family counseling and / or coordination of care    A description of the counseling / coordination of care:  Awaiting rehab

## 2019-02-25 NOTE — PLAN OF CARE
Problem: OCCUPATIONAL THERAPY ADULT  Goal: Performs self-care activities at highest level of function for planned discharge setting  See evaluation for individualized goals  Description  Treatment Interventions: ADL retraining, Functional transfer training, UE strengthening/ROM, Endurance training, Cognitive reorientation, Patient/family training, Equipment evaluation/education, Compensatory technique education, Energy conservation, Activityengagement          See flowsheet documentation for full assessment, interventions and recommendations  Outcome: Progressing  Note:   Limitation: Decreased ADL status, Decreased UE strength, Decreased Safe judgement during ADL, Decreased cognition, Decreased endurance, Decreased high-level ADLs, Decreased self-care trans  Prognosis: Good  Assessment: Pt was seen for skilled OT with focus on completion of self care tasks, functional mobility, review of fall prevention, energy conservation and review of current plan of care  Pt able to tolerate full self care routine with occasional rest breaks provided  No LOB noted with functional tasks instance  Pt reports having daily assistance from her neighbor and pays her to provide assistance with daily tasks and home care needs  See above levels of a required for all functional tasks  Pt may benefit from further rehab with focus on achieving optimal performance levels with all functional tasks   Continue to recommend Inpatient rehab     OT Discharge Recommendation: Short Term Rehab

## 2019-02-25 NOTE — PROGRESS NOTES
Progress Note - Jeanette Marie 1940, [de-identified] y o  female MRN: [id number]  Unit/Bed#: E4 -01 Encounter: [id number]  Primary Care Provider: Sue Newberry MD   Date and time admitted to hospital: 2/17/2019  4:17 PM        Assessment and Plan  * Acute ST elevation myocardial infarction (STEMI) [facility])  Assessment & Plan  Acute ST-elevation myocardial infarction declined cardiac catheterization with post infarction pericarditis  On max medical therapy:  Aspirin, clopidogrel, and carvedilol    Pericarditis  Assessment & Plan  Post infarction pericarditis  No further chest pain and colchicine discontinued by cardiology    Anxiety  Assessment & Plan  Mood stable on alprazolam and citalopram    Hypotension  Assessment & Plan  Hypotensive  On lisinopril and carvedilol for STEMI    UTI (urinary tract infection)  Assessment & Plan  Finished empiric cephalexin    Memory impairment  Assessment & Plan  Lives alone; never  or had any children  Has assistance of a neighbor who is a paid caregiver Janet Bruce ([phone]), states that she is the POA    Type 2 diabetes mellitus, with long-term current use of insulin [facility])  Assessment & Plan  Lab Results   Component Value Date    HGBA1C 11 2 (H) 01/18/2018       Recent Labs     02/24/19  1126 02/24/19  1622 02/24/19  2041 02/25/19  0739   POCGLU 158* 238* 150* 126     Diabetes mellitus uncontrolled evidenced by elevated A1c  Continue glargine and sliding scale during hospitalization    VTE Pharmacologic Prophylaxis: add heparin    Patient Centered Rounds: I have performed bedside rounds with nursing staff today  Discussions with Specialists or Other Care Team Provider:   Education and Discussions with Family / Patient:     Time Spent for Care: 25 mins  More than 50% of total time spent on counseling and coordination of care as described above      Current Length of Stay: 8 day(s)  Current Patient Status: Inpatient     Certification Statement: The patient will continue to require additional inpatient hospital stay due to Acute ST elevation myocardial infarction (STEMI) [facility])  Discharge Plan / Estimated Discharge Date:  Needs rehab placement    Code Status: Level 3 - DNAR and DNI  ______________________________________________________________________________    Subjective:   Patient seen and examined  Still feeling weak  No chest pain    Objective:   Vitals: Blood pressure 109/70, pulse 69, temperature 99 1 °F (37 3 °C), temperature source Tympanic, resp  rate 18, height 5' (1 524 m), weight 75 4 kg (166 lb 3 6 oz), SpO2 90 %  Physical Exam:   General appearance: alert, appears stated age and cooperative  Head: Normocephalic, without obvious abnormality, atraumatic  Lungs: diminished breath sounds  Heart: regular rate and rhythm  Abdomen: soft, non-tender, positive bowel sounds   Back: negative, range of motion normal  Extremities: extremities atraumatic, no cyanosis or edema  Neurologic: Grossly normal    Additional Data:   Labs:  Results from last 7 days   Lab Units 02/22/19  0605 02/21/19  0512 02/19/19  1321   WBC Thousand/uL 12 93* 13 34* 11 96*   HEMOGLOBIN g/dL 11 7 11 1* 11 5   HEMATOCRIT % 37 4 35 9 34 9   MCV fL 92 91 89   PLATELETS Thousands/uL 442* 363 349     Results from last 7 days   Lab Units 02/21/19  0512 02/19/19  0614   SODIUM mmol/L 139 135*   POTASSIUM mmol/L 3 7 3 5   CHLORIDE mmol/L 105 102   CO2 mmol/L 24 24   ANION GAP mmol/L 10 9   BUN mg/dL 25 32*   CREATININE mg/dL 0 65 0 68   CALCIUM mg/dL 8 6 8 2*   EGFR ml/min/1 73sq m 84 83   GLUCOSE RANDOM mg/dL 122 116                      Results from last 7 days   Lab Units 02/25/19  0739 02/24/19  2041 02/24/19  1622 02/24/19  1126 02/24/19  0718 02/23/19  2108 02/23/19  1612 02/23/19  1106 02/23/19  0728 02/22/19  2136 02/22/19  1648 02/22/19  1053   POC GLUCOSE mg/dl 126 150* 238* 158* 158* 189* 209* 188* 157* 151* 147* 156*             * I Have Reviewed All Lab Data Listed Above      Cultures: Results from last 7 days   Lab Units 02/18/19 2005   URINE CULTURE  >100,000 cfu/ml          Imaging:  Imaging Reports Reviewed Today Include:   No results found  Scheduled Meds:  Current Facility-Administered Medications:  acetaminophen 325 mg Oral Q8H PRN Niraj Prechtel, DO   ALPRAZolam 0 25 mg Oral TID PRN Sandeep Sexton MD   aspirin 81 mg Oral Daily Galilea Olsen MD   atorvastatin 20 mg Oral QPM Galilea Olsen MD   carvedilol 3 125 mg Oral BID With Meals [place]ilad Boswell Presttobin, DO   citalopram 10 mg Oral Daily Niraj Prechtel, DO   clopidogrel 75 mg Oral Daily Niraj Prechtel, DO   famotidine 20 mg Oral BID Niraj Prechtel, DO   insulin glargine 15 Units Subcutaneous HS Jayla Noel, DO   insulin lispro 1-5 Units Subcutaneous HS Niraj Prechtel, DO   insulin lispro 1-6 Units Subcutaneous TID AC Niraj Prechtel, DO   lactobacillus acidophilus-bulgaricus 1 packet Oral TID With Meals Sandeep Sexton MD   lisinopril 2 5 mg Oral Daily Rujul Hall, DO   melatonin 6 mg Oral HS Isadore Pink, CRNP   nitroglycerin 0 4 mg Sublingual Q5 Min PRN Largo Flattery, DO       Jennifer Woods, DO  [facility] Internal Medicine  Hospitalist    ** Please Note: This note has been constructed using a voice recognition system   **

## 2019-02-25 NOTE — PROGRESS NOTES
Progress Note - Cardiology   Pia Villegas [de-identified] y o  female MRN: [id number]  Unit/Bed#: E4 -01 Encounter: [id number]        Problem List:  Principal Problem:    Acute ST elevation myocardial infarction (STEMI) ([facility] )  Active Problems:    Type 2 diabetes mellitus, with long-term current use of insulin ([facility] )    Memory impairment    UTI (urinary tract infection)    Acute combined systolic (congestive) and diastolic (congestive) heart failure (HCC)    Hypotension    Anxiety    Leukocytosis    Pericarditis      Asessment/Plan:  1  Acute anterior wall STEMI  2  Ischemic cardiomyopathy  3  Post MI pericarditis  4  Diabetes  5  Hypertension  6  Dyslipidemia      Plan:  STEMI --> Maximize medical therapy   Aspirin 81QD, Atorvastatin 20QHS, Coreg 3 125 BID, Plavix 75     Ischemic cardiomyopathy --> does not appear fluid overloaded      Medical therapy      Coreg/ Lisinopril    Pericarditis --> appears improved    Colchicine discontinued     Daily aspirin    HTN --> well controlled  HLD --> on statin  Questionable if she needs it at her age       Subjective:  Patient seen examined at bedside  Has no complaints today  No chest pain, shortness of breath, palpitations, dizziness  Wants to go home  Vitals:  Vitals:    02/17/19 1731 02/17/19 1929   Weight: 76 6 kg (168 lb 14 oz) 75 4 kg (166 lb 3 6 oz)   ,  Vitals:    02/24/19 1448 02/24/19 2300 02/25/19 0738 02/25/19 1121   BP: 100/59 119/66 109/70 116/80   BP Location: Right arm Right arm Right arm    Pulse: 66 71 69 69   Resp: 18 18 18    Temp: 99 4 °F (37 4 °C) 98 3 °F (36 8 °C) 99 1 °F (37 3 °C)    TempSrc: Tympanic Tympanic Tympanic    SpO2: 92% 90% 90%    Weight:       Height:           Exam:  General:  alert, oriented and in no distress  Heart: RRR   No murmur, rub, or gallop  Respiratory effort: unlabored  Lungs: normal air entry, lungs clear to auscultation and no rales, rhonchi or wheezing  Lower Limbs: no pitting edema             Medications:    Current Facility-Administered Medications:     acetaminophen (TYLENOL) tablet 325 mg, 325 mg, Oral, Q8H PRN, Niraj Prechtel, DO    ALPRAZolam (XANAX) tablet 0 25 mg, 0 25 mg, Oral, TID PRN, Sharlene Panda MD    aspirin chewable tablet 81 mg, 81 mg, Oral, Daily, Jossue Echevarria MD, 81 mg at 02/25/19 1123    atorvastatin (LIPITOR) tablet 20 mg, 20 mg, Oral, QPM, Jossue Echevarria MD, 20 mg at 02/24/19 1732    carvedilol (COREG) tablet 3 125 mg, 3 125 mg, Oral, BID With Meals, Rujul Hall, DO, 3 125 mg at 02/25/19 1123    citalopram (CeleXA) tablet 10 mg, 10 mg, Oral, Daily, Niraj Prechtel, DO, 10 mg at 02/25/19 1121    clopidogrel (PLAVIX) tablet 75 mg, 75 mg, Oral, Daily, Niraj Prechtel, DO, 75 mg at 02/25/19 1121    famotidine (PEPCID) tablet 20 mg, 20 mg, Oral, BID, Niraj Prechtel, DO, 20 mg at 02/25/19 1122    insulin glargine (LANTUS) subcutaneous injection 15 Units 0 15 mL, 15 Units, Subcutaneous, HS, [place], DO, 15 Units at 02/24/19 2151    insulin lispro (HumaLOG) 100 units/mL subcutaneous injection 1-5 Units, 1-5 Units, Subcutaneous, HS, Niraj Prechtel, DO, 1 Units at 02/23/19 2145    insulin lispro (HumaLOG) 100 units/mL subcutaneous injection 1-6 Units, 1-6 Units, Subcutaneous, TID AC, 3 Units at 02/24/19 1630 **AND** Fingerstick Glucose (POCT), , , TID AC, Niraj Prechtel, DO    lactobacillus acidophilus-bulgaricus (FLORANEX) packet 1 packet, 1 packet, Oral, TID With Meals, Sharlene Panda MD, 1 packet at 02/25/19 1019    lisinopril (ZESTRIL) tablet 2 5 mg, 2 5 mg, Oral, Daily, Rujul Hall, DO, 2 5 mg at 02/25/19 1122    melatonin tablet 6 mg, 6 mg, Oral, HS, [place] Symone, STORM, 6 mg at 02/24/19 2151    nitroglycerin (NITROSTAT) SL tablet 0 4 mg, 0 4 mg, Sublingual, Q5 Min PRN, Niraj Wang, DO, 0 4 mg at 02/19/19 1237      Labs/Data:        Results from last 7 days   Lab Units 02/22/19  0605 02/21/19  0512 02/19/19  1321   WBC Thousand/uL 12 93* 13 34* 11 96*   HEMOGLOBIN g/dL 11 7 11 1* 11 5   HEMATOCRIT % 37 4 35 9 34 9   PLATELETS Thousands/uL 442* 363 349     Results from last 7 days   Lab Units 02/21/19  0512 02/19/19  0614   POTASSIUM mmol/L 3 7 3 5   CHLORIDE mmol/L 105 102   CO2 mmol/L 24 24   BUN mg/dL 25 32*

## 2019-02-25 NOTE — OCCUPATIONAL THERAPY NOTE
Occupational Therapy Treatment Note:         02/25/19 1212   Restrictions/Precautions   Weight Bearing Precautions Per Order No   Other Precautions Fall Risk;Cognitive   Pain Assessment   Pain Assessment No/denies pain   Pain Score No Pain   ADL   Where Assessed Edge of bed   Grooming Assistance 5  Supervision/Setup   Grooming Deficit Setup   UB Bathing Assistance 5  Supervision/Setup   UB Bathing Deficit Setup   LB Bathing Assistance 5  Supervision/Setup   LB Bathing Deficit Setup;Steadying;Verbal cueing;Supervision/safety; Increased time to complete;Perineal area; Buttocks;Right lower leg including foot; Left lower leg including foot   LB Bathing Comments cues for sequencing tasks  UB Dressing Assistance 5  Supervision/Setup   UB Dressing Deficit Setup   LB Dressing Assistance 5  Supervision/Setup   LB Dressing Deficit Setup;Steadying; Requires assistive device for steadying;Verbal cueing;Supervision/safety; Increased time to complete; Don/doff R sock; Don/doff L sock; Thread RLE into underwear; Thread LLE into underwear;Pull up over hips   LB Dressing Comments cues for safety provided for clothing management instance  Toileting Assistance  5  Supervision/Setup   Toileting Deficit Setup;Steadying;Verbal cueing;Supervison/safety; Increased time to complete;Clothing management down;Clothing management up   Toileting Comments Pt with noted incontinence of bladder this tx session  Functional Standing Tolerance   Time 5 mins   Activity self care tasks    Comments Increased fatigue noted after 5 mins instance  Seated rest break required      Bed Mobility   Supine to Sit 5  Supervision   Additional items Assist x 1   Transfers   Sit to Stand 4  Minimal assistance   Additional items Assist x 1   Stand to Sit 4  Minimal assistance   Additional items Assist x 1   Stand pivot 4  Minimal assistance   Additional items Assist x 1   Toilet transfer 4  Minimal assistance   Additional items Assist x 1   Additional Comments cues for safe use of RW provided  Toilet Transfers   Toilet Transfer From [employer] Transfer Type To and from   Toilet Transfer to [employer] Transfer Technique Stand pivot; Ambulating   Toilet Transfers Minimal assistance;Verbal cues   Toilet Transfers Comments cues for safety required  Cognition   Overall Cognitive Status Impaired   Arousal/Participation Alert; Responsive; Cooperative   Attention Attends with cues to redirect   Orientation Level Oriented X4   Memory Decreased short term memory   Following Commands Follows multistep commands with increased time or repetition   Comments Improvement noted with carry over of newly learned information  Pt will benefit from daily checks based on cognitive deficits noted  Additional Activities   Additional Activities Other (Comment)  (reviewed fall prevention/current plan of care  )   Additional Activities Comments Pt reports having F understanding    Activity Tolerance   Activity Tolerance Patient limited by fatigue   Medical Staff Made Aware Reported all findings to nursing staff  Assessment   Assessment Pt was seen for skilled OT with focus on completion of self care tasks, functional mobility, review of fall prevention, energy conservation and review of current plan of care  Pt able to tolerate full self care routine with occasional rest breaks provided  No LOB noted with functional tasks instance  Pt reports having daily assistance from her neighbor and pays her to provide assistance with daily tasks and home care needs  See above levels of a required for all functional tasks  Pt may benefit from further rehab with focus on achieving optimal performance levels with all functional tasks  Continue to recommend Inpatient rehab   Plan   Treatment Interventions ADL retraining;Functional transfer training;Cognitive reorientation; Endurance training   Goal Expiration Date 03/07/19   Treatment Day 2   OT Frequency 3-5x/wk   Recommendation   OT Discharge Recommendation Short Term Rehab   Barthel Index   Feeding 10   Bathing 0   Grooming Score 5   Dressing Score 5   Bladder Score 10   Bowels Score 10   Toilet Use Score 5   Transfers (Bed/Chair) Score 10   Mobility (Level Surface) Score 0   Stairs Score 0   Barthel Index Score 55   Modified [place] Scale   Modified [place] Scale 4   [place]

## 2019-02-25 NOTE — ASSESSMENT & PLAN NOTE
Lab Results   Component Value Date    HGBA1C 11 2 (H) 01/18/2018       Recent Labs     02/24/19  1126 02/24/19  1622 02/24/19  2041 02/25/19  0739   POCGLU 158* 238* 150* 126     Diabetes mellitus uncontrolled evidenced by elevated A1c    Continue glargine and sliding scale during hospitalization

## 2019-02-25 NOTE — PLAN OF CARE
Problem: Potential for Falls  Goal: Patient will remain free of falls  Description  INTERVENTIONS:  - Assess patient frequently for physical needs  -  Identify cognitive and physical deficits and behaviors that affect risk of falls    -  [place] fall precautions as indicated by assessment   - Educate patient/family on patient safety including physical limitations  - Instruct patient to call for assistance with activity based on assessment  - Modify environment to reduce risk of injury  - Consider OT/PT consult to assist with strengthening/mobility  Outcome: Progressing     Problem: Prexisting or High Potential for Compromised Skin Integrity  Goal: Skin integrity is maintained or improved  Description  INTERVENTIONS:  - Identify patients at risk for skin breakdown  - Assess and monitor skin integrity  - Assess and monitor nutrition and hydration status  - Monitor labs (i e  albumin)  - Assess for incontinence   - Turn and reposition patient  - Assist with mobility/ambulation  - Relieve pressure over bony prominences  - Avoid friction and shearing  - Provide appropriate hygiene as needed including keeping skin clean and dry  - Evaluate need for skin moisturizer/barrier cream  - Collaborate with interdisciplinary team (i e  Nutrition, Rehabilitation, etc )   - Patient/family teaching  Outcome: Progressing     Problem: PAIN - ADULT  Goal: Verbalizes/displays adequate comfort level or baseline comfort level  Description  Interventions:  - Encourage patient to monitor pain and request assistance  - Assess pain using appropriate pain scale  - Administer analgesics based on type and severity of pain and evaluate response  - Implement non-pharmacological measures as appropriate and evaluate response  - Consider cultural and social influences on pain and pain management  - Notify physician/advanced practitioner if interventions unsuccessful or patient reports new pain  Outcome: Progressing     Problem: SAFETY ADULT  Goal: Maintain or return to baseline ADL function  Description  INTERVENTIONS:  -  Assess patient's ability to carry out ADLs; assess patient's baseline for ADL function and identify physical deficits which impact ability to perform ADLs (bathing, care of mouth/teeth, toileting, grooming, dressing, etc )  - Assess/evaluate cause of self-care deficits   - Assess range of motion  - Assess patient's mobility; develop plan if impaired  - Assess patient's need for assistive devices and provide as appropriate  - Encourage maximum independence but intervene and supervise when necessary  ¯ Involve family in performance of ADLs  ¯ Assess for home care needs following discharge   ¯ Request OT consult to assist with ADL evaluation and planning for discharge  ¯ Provide patient education as appropriate  Outcome: Progressing  Goal: Maintain or return mobility status to optimal level  Description  INTERVENTIONS:  - Assess patient's baseline mobility status (ambulation, transfers, stairs, etc )    - Identify cognitive and physical deficits and behaviors that affect mobility  - Identify mobility aids required to assist with transfers and/or ambulation (gait belt, sit-to-stand, lift, walker, cane, etc )  - [place] fall precautions as indicated by assessment  - Record patient progress and toleration of activity level on Mobility SBAR; progress patient to next Phase/Stage  - Instruct patient to call for assistance with activity based on assessment  - Request Rehabilitation consult to assist with strengthening/weightbearing, etc   Outcome: Progressing     Problem: DISCHARGE PLANNING  Goal: Discharge to home or other facility with appropriate resources  Description  INTERVENTIONS:  - Identify barriers to discharge w/patient and caregiver  - Arrange for needed discharge resources and transportation as appropriate  - Identify discharge learning needs (meds, wound care, etc )  - Arrange for interpretive services to assist at discharge as needed  - Refer to Case Management Department for coordinating discharge planning if the patient needs post-hospital services based on physician/advanced practitioner order or complex needs related to functional status, cognitive ability, or social support system  Outcome: Progressing     Problem: Knowledge Deficit  Goal: Patient/family/caregiver demonstrates understanding of disease process, treatment plan, medications, and discharge instructions  Description  Complete learning assessment and assess knowledge base  Interventions:  - Provide teaching at level of understanding  - Provide teaching via preferred learning methods  Outcome: Progressing     Problem: CARDIOVASCULAR - ADULT  Goal: Maintains optimal cardiac output and hemodynamic stability  Description  INTERVENTIONS:  - Monitor I/O, vital signs and rhythm  - Monitor for S/S and trends of decreased cardiac output i e  bleeding, hypotension  - Assess quality of pulses, skin color and temperature  - Assess for signs of decreased coronary artery perfusion - ex   Angina  - Instruct patient to report change in severity of symptoms   Outcome: Progressing  Goal: Absence of cardiac dysrhythmias or at baseline rhythm  Description  INTERVENTIONS:  - Continuous cardiac monitoring, monitor vital signs, obtain 12 lead EKG if indicated  - Administer antiarrhythmic and heart rate control medications as ordered  - Monitor electrolytes and administer replacement therapy as ordered  Outcome: Progressing     Problem: SKIN/TISSUE INTEGRITY - ADULT  Goal: Skin integrity remains intact  Description  INTERVENTIONS  - Identify patients at risk for skin breakdown  - Assess and monitor skin integrity  - Assess and monitor nutrition and hydration status  - Assess for incontinence   - Turn and reposition patient  - Assist with mobility/ambulation  - Relieve pressure over bony prominences  - Avoid friction and shearing  - Provide appropriate hygiene as needed including keeping skin clean and dry  - Evaluate need for skin moisturizer/barrier cream  - Collaborate with interdisciplinary team (i e  Nutrition, Rehabilitation, etc )   - Patient/family teaching   Outcome: Progressing  Goal: Incision(s), wounds(s) or drain site(s) healing without S/S of infection  Description  INTERVENTIONS  - Assess and document risk factors for skin impairment   - Assess and document dressing, incision, wound bed, drain sites and surrounding tissue  - Initiate Nutrition services consult and/or wound management as needed  Outcome: Progressing  Goal: Oral mucous membranes remain intact  Outcome: Progressing     Problem: INFECTION - ADULT  Goal: Absence or prevention of progression during hospitalization  Description  INTERVENTIONS:  - Assess and monitor for signs and symptoms of infection  - Monitor lab/diagnostic results  - Monitor all insertion sites, i e  indwelling lines, tubes, and drains  - Monitor endotracheal (as able) and nasal secretions for changes in amount and color  - [place] appropriate cooling/warming therapies per order  - Administer medications as ordered  - Instruct and encourage patient and family to use good hand hygiene technique  - Identify and instruct in appropriate isolation precautions for identified infection/condition  Outcome: Progressing  Goal: Absence of fever/infection during neutropenic period  Description  INTERVENTIONS:  - Monitor WBC  - Implement neutropenic guidelines  Outcome: Progressing     Problem: NEUROSENSORY - ADULT  Goal: Achieves stable or improved neurological status  Description  INTERVENTIONS  - Monitor and report changes in neurological status  - Initiate measures to prevent increased intracranial pressure  - Maintain blood pressure and fluid volume within ordered parameters to optimize cerebral perfusion  - Monitor temperature, glucose, and sodium or any other associated labs   Initiate appropriate interventions as ordered  - Monitor for seizure activity   - Administer anti-seizure medications as ordered  Outcome: Progressing  Goal: Absence of seizures  Description  INTERVENTIONS  - Monitor for seizure activity  - Administer anti-seizure medications as ordered  - Monitor neurological status  Outcome: Progressing  Goal: Remains free of injury related to seizures activity  Description  INTERVENTIONS  - Maintain airway, patient safety  and administer oxygen as ordered  - Monitor patient for seizure activity, document and report duration and description of seizure to physician/advanced practitioner  - If seizure occurs,  ensure patient safety during seizure  - Reorient patient post seizure  - Seizure pads on all 4 side rails  - Instruct patient/family to notify RN of any seizure activity including if an aura is experienced  - Instruct patient/family to call for assistance with activity based on nursing assessment  - Administer anti-seizure medications as ordered  - Monitor fetal well being  Outcome: Progressing  Goal: Achieves maximal functionality and self care  Description  INTERVENTIONS  - Monitor swallowing and airway patency with patient fatigue and changes in neurological status  - Encourage and assist patient to increase activity and self care with guidance from rehab services  - Encourage visually impaired, hearing impaired and aphasic patients to use assistive/communication devices  Outcome: Progressing     Problem: RESPIRATORY - ADULT  Goal: Achieves optimal ventilation and oxygenation  Description  INTERVENTIONS:  - Assess for changes in respiratory status  - Assess for changes in mentation and behavior  - Position to facilitate oxygenation and minimize respiratory effort  - Oxygen administration by appropriate delivery method based on oxygen saturation (per order) or ABGs  - Initiate smoking cessation education as indicated  - Encourage broncho-pulmonary hygiene including cough, deep breathe, Incentive Spirometry  - Assess the need for suctioning and aspirate as needed  - Assess and instruct to report SOB or any respiratory difficulty  - Respiratory Therapy support as indicated  Outcome: Progressing     Problem: GENITOURINARY - ADULT  Goal: Maintains or returns to baseline urinary function  Description  INTERVENTIONS:  - Assess urinary function  - Encourage oral fluids to ensure adequate hydration  - Administer IV fluids as ordered to ensure adequate hydration  - Administer ordered medications as needed  - Offer frequent toileting  - Follow urinary retention protocol if ordered  Outcome: Progressing  Goal: Absence of urinary retention  Description  INTERVENTIONS:  - Assess patient?s ability to void and empty bladder  - Monitor I/O  - Bladder scan as needed  - Discuss with physician/AP medications to alleviate retention as needed  - Discuss catheterization for long term situations as appropriate  Outcome: Progressing  Goal: Urinary catheter remains patent  Description  INTERVENTIONS:  - Assess patency of urinary catheter  - If patient has a chronic vang, consider changing catheter if non-functioning  - Follow guidelines for intermittent irrigation of non-functioning urinary catheter  Outcome: Progressing     Problem: DISCHARGE PLANNING - CARE MANAGEMENT  Goal: Discharge to post-acute care or home with appropriate resources  Description  INTERVENTIONS:    PT/OT is recommending short stay rehab  TC to [facility] and her first choice is Son and then [facility] and [facility]  Referral were made  Insurance will need to auth stay  CM will f/u   RN reports friend Gonzales Hernandez brought in [facility] paperwork listing her as POA for pt  Discharge plan at this time is home pending PT laney      CM will f/u with POA papers and discharge needs for pt         - Conduct assessment to determine patient/family and health care team treatment goals, and need for post-acute services based on payer coverage, community resources, and patient preferences, and barriers to discharge  - Address psychosocial, clinical, and financial barriers to discharge as identified in assessment in conjunction with the patient/family and health care team  - Arrange appropriate level of post-acute services according to patient's   needs and preference and payer coverage in collaboration with the physician and health care team  - Communicate with and update the patient/family, physician, and health care team regarding progress on the discharge plan  - Arrange appropriate transportation to post-acute venues     Outcome: Progressing

## 2019-02-25 NOTE — PHYSICAL THERAPY NOTE
Physical Therapy Treatment Note     02/25/19 5860   Pain Assessment   Pain Assessment No/denies pain   Pain Score No Pain   Restrictions/Precautions   Other Precautions Cognitive; Bed Alarm; Fall Risk;Telemetry   General   Chart Reviewed Yes   Family/Caregiver Present No   Cognition   Overall Cognitive Status Impaired   Arousal/Participation Alert; Cooperative;Responsive   Orientation Level Oriented to person;Oriented to place   Memory Decreased short term memory;Decreased recall of recent events;Decreased recall of precautions   Following Commands Follows one step commands without difficulty   Subjective   Subjective Pt offers no c/o pain  Pt reporting fatigue but agreeable to PT  Bed Mobility   Supine to Sit 5  Supervision   Additional items Assist x 1; Increased time required   Sit to Supine 5  Supervision   Additional items Assist x 1; Increased time required   Transfers   Sit to Stand 4  Minimal assistance   Additional items Assist x 1; Armrests; Increased time required;Verbal cues   Stand to Sit 4  Minimal assistance   Additional items Assist x 1; Armrests; Increased time required;Verbal cues   Toilet transfer 5  Supervision   Additional items Assist x 1; Increased time required;Verbal cues;Standard toilet  (with use of grab bar   )   Ambulation/Elevation   Gait pattern Foward flexed;Decreased foot clearance; Short stride  (variable pace, fast with cues to slow down)   Gait Assistance 4  Minimal assist   Additional items Assist x 1;Verbal cues   Assistive Device Rolling walker   Distance 110' x2, seared rest between gait trials  BARBOZA noted, revcovers with seasted rest breaks       Balance   Static Sitting Good   Dynamic Sitting Fair +   Static Standing Fair   Dynamic Standing Fair   Ambulatory Fair   Endurance Deficit   Endurance Deficit Yes   Endurance Deficit Description barboza faitgue   Activity Tolerance   Activity Tolerance Patient limited by fatigue   Medical Staff Made Aware RNmarilin   Nurse Made Aware yes Exercises   Hip Flexion Supine;10 reps;AROM; Bilateral  (SLR)   Knee AROM Short Arc Quad Supine;10 reps;AROM; Bilateral   Ankle Pumps Supine;10 reps;AROM; Bilateral   Assessment   Prognosis Good   Problem List Decreased strength;Decreased range of motion;Decreased endurance;Decreased mobility; Impaired balance;Decreased safety awareness;Decreased cognition   Assessment Pt continues to demonstrate poor safety awareness with all aspects of mobility  Poor carry over with safe transfer techniques, pt requires verbal cues every time for proper handplacement with sit to stand and stand to sit transfers  Pt  Progressed with ambulation distances to 110' x2 with seated rest between gait trials due to BARBOZA and fatigue  Pt requires min assist and verbal cues to slow down, stand tall, step closer to Rw and to maintain close distance to Rw at all times  Pt performs bed mobility with supervision and toilet transfers with supervision with use of grab bar  Pt incontinent of bowel, requiring total assist for clean up and saad care  Pt performs supine b/l le arom exercises x 10 reps  Pt will continue to benefit from skilled inpt PT and STR inorder to maximize functional mobility and outcomes as pt lives alone and needs to be functioning at mod I level of mobility  Goals   Patient Goals " to get stronger before returning home "     STG Expiration Date 03/04/19   Treatment Day 1   Plan   Treatment/Interventions Functional transfer training;LE strengthening/ROM; Therapeutic exercise; Endurance training;Patient/family training;Equipment eval/education; Bed mobility;Gait training;Spoke to nursing;OT   Progress Progressing toward goals   PT Frequency   (4-5x/week)   Recommendation   Recommendation Short-term skilled PT   PT - OK to Discharge Yes  (to rehab)         Lovilia Moment, PTA

## 2019-02-25 NOTE — ASSESSMENT & PLAN NOTE
Acute ST-elevation myocardial infarction declined cardiac catheterization with post infarction pericarditis    On max medical therapy:  Aspirin, clopidogrel, and carvedilol

## 2019-02-25 NOTE — PLAN OF CARE
Problem: PHYSICAL THERAPY ADULT  Goal: Performs mobility at highest level of function for planned discharge setting  See evaluation for individualized goals  Description  Treatment/Interventions: Functional transfer training, LE strengthening/ROM, Therapeutic exercise, Endurance training, Patient/family training, Equipment eval/education, Bed mobility, Gait training, Compensatory technique education, Continued evaluation, Spoke to nursing, Spoke to case management, Spoke to MD  Equipment Recommended: Jadyn Thompson)       See flowsheet documentation for full assessment, interventions and recommendations  Outcome: Progressing  Note:   Prognosis: Good  Problem List: Decreased strength, Decreased range of motion, Decreased endurance, Decreased mobility, Impaired balance, Decreased safety awareness, Decreased cognition  Assessment: Pt continues to demonstrate poor safety awareness with all aspects of mobility  Poor carry over with safe transfer techniques, pt requires verbal cues every time for proper handplacement with sit to stand and stand to sit transfers  Pt  Progressed with ambulation distances to 110' x2 with seated rest between gait trials due to BARBOZA and fatigue  Pt requires min assist and verbal cues to slow down, stand tall, step closer to Rw and to maintain close distance to Rw at all times  Pt performs bed mobility with supervision and toilet transfers with supervision with use of grab bar  Pt incontinent of bowel, requiring total assist for clean up and saad care  Pt performs supine b/l le arom exercises x 10 reps  Pt will continue to benefit from skilled inpt PT and STR inorder to maximize functional mobility and outcomes as pt lives alone and needs to be functioning at mod I level of mobility  Barriers to Discharge Comments: lives alone  Recommendation: Short-term skilled PT     PT - OK to Discharge: Yes(to rehab)    See flowsheet documentation for full assessment

## 2019-02-26 NOTE — SOCIAL WORK
MD was not able to get the peer to peer over turned for SNF  Atul Grace is aware of this  TC to [address] and informed her of the news about insurance  Ihor Oppenheim is agreeable to [facility]  Ihor Oppenheim will  tomorrow at 1200  MD and RN are aware of this  Ihor Oppenheim reports pt has refused [facility] as she was approved for them in the past and pt will not go to any Adult Day Care  Ihor Oppenheim will set up [employer] ([employer]) to start again and pt has [employer] 
Met with pt and gave pt's her sister's phone number  Jasvir Sheets [phone]  Connected pt with her sister by using phone in pt's room  Jessica Banerjee stated that Luciano Greer is neighbor/friend that is POA and caregiver for pt  Her phone number is [phone]  Dusty Counts stated several family members have   Pt has son, but she did not know his phone number  Dusty Counts stated she lives with her dtr and cares for her  TC to [employer] who will bring in paper work for [employer] tomorrow to the hospital  Presley Lawrence reports she lives a couple of door down from pt in the same apartment building in [place]  Pt has elevator access  PCP is Dr Josie Leblanc  Pt was independent with ADLs and amb with rollator  DME:  Rollator, shower bench, and raised toilet seat  Pt was not open with any VNA  Pt uses [employer]  Pt has no hx of MH or D&A  [employer] will bring in paperwork for POA  She is now listed as  on face sheet, as sister that was contact is no longer living  Presley Lawrence reports she will transport pt home when discharge  Presley Lawrence has been a neighbor/friend for many years  Presley Lawrence has now become caregiver to pt by providing cleaning, laundry, food shopping and will stay in her apartment while pt is in the shower just for safety reasons  CM will f/u with POA papers and discharge needs for pt 
Met with pt and she is agreeable to [facility]  Rec a message to call Jacobo George from [employer] ([phone]) to call when pt is discharge and what location pt will go to  A message was left for her today that pt would be discharge tomorrow with KAUSHAL 
PT/OT is recommending short stay rehab  TC to [facility] and her first choice is [facility] and then [facility] and [facility]  Referral were made  Insurance will need to auth stay  CM will f/u  
RN reports friend Mariano Grimaldo brought in [place] paperwork listing her as POA for pt   Discharge plan at this time is home pending PT laney 
Rec a call from liaison with [facility] today at [phone] that insurance denied  Peer to peer can be done with Dr Gita Dunn at [phone] with case number [id number]  MD has this information for peer to peer 
(4) no impairment

## 2019-02-26 NOTE — ASSESSMENT & PLAN NOTE
Lives alone; never  or had any children    Has assistance of a neighbor who is a paid caregiver Rush Kessler ([phone])

## 2019-02-26 NOTE — ASSESSMENT & PLAN NOTE
Lab Results   Component Value Date    HGBA1C 11 2 (H) 01/18/2018       Recent Labs     02/25/19  2035 02/26/19  0733 02/26/19  1131 02/26/19  1552   POCGLU 201* 127 216* 127     Diabetes mellitus uncontrolled evidenced by elevated A1c    Continue glargine and sliding scale during hospitalization

## 2019-02-26 NOTE — ASSESSMENT & PLAN NOTE
Acute ST-elevation myocardial infarction declined cardiac catheterization with post infarction pericarditis    On maximal medical therapy:  Aspirin, clopidogrel, and carvedilol

## 2019-02-26 NOTE — PROGRESS NOTES
Progress Note - Elsa Chen 1940, [de-identified] y o  female MRN: [id number]    Unit/Bed#: E4 -01 Encounter: [id number]    Primary Care Provider: Vinayak Vargas MD   Date and time admitted to hospital: 2/17/2019  4:17 PM        * Acute ST elevation myocardial infarction (STEMI) [facility])  Assessment & Plan  Acute ST-elevation myocardial infarction declined cardiac catheterization with post infarction pericarditis  On maximal medical therapy:  Aspirin, clopidogrel, and carvedilol    Pericarditis  Assessment & Plan  Post infarction pericarditis  No further chest pain and colchicine discontinued by cardiology    Anxiety  Assessment & Plan  Mood stable on alprazolam and citalopram    Hypotension  Assessment & Plan  Hypotensive; on lisinopril and carvedilol for STEMI with hold parameters    UTI (urinary tract infection)  Assessment & Plan  Finished empiric cephalexin    Memory impairment  Assessment & Plan  Lives alone; never  or had any children  Has assistance of a neighbor who is a paid caregiver Vickie Soto ([phone])    Type 2 diabetes mellitus, with long-term current use of insulin [facility])  Assessment & Plan  Lab Results   Component Value Date    HGBA1C 11 2 (H) 01/18/2018       Recent Labs     02/25/19  2035 02/26/19  0733 02/26/19  1131 02/26/19  1552   POCGLU 201* 127 216* 127     Diabetes mellitus uncontrolled evidenced by elevated A1c  Continue glargine and sliding scale during hospitalization      VTE Pharmacologic Prophylaxis: will order lovenox    Patient Centered Rounds: I have performed bedside rounds with nursing staff today  Discussions with Specialists or Other Care Team Provider:  Case management  Education and Discussions with Family / Patient:     Time Spent for Care: 25 mins  More than 50% of total time spent on counseling and coordination of care as described above      Current Length of Stay: 9 day(s)  Current Patient Status: Inpatient     Certification Statement: The patient will continue to require additional inpatient hospital stay due to Acute ST elevation myocardial infarction (STEMI) [facility])  Discharge Plan / Estimated Discharge Date:  Home tomorrow with services    Code Status: Level 3 - DNAR and DNI  ______________________________________________________________________________    Subjective:   Patient seen and examined  No new complaints, working with therapy    Objective:   Vitals: Blood pressure 90/52, pulse 68, temperature 97 5 °F (36 4 °C), temperature source Temporal, resp  rate 18, height 5' (1 524 m), weight 75 4 kg (166 lb 3 6 oz), SpO2 91 %  Physical Exam:   General appearance: alert, appears stated age and cooperative  Head: atraumatic  Lungs: clear to auscultation bilaterally  Heart: regular rate and rhythm  Abdomen: soft, non-tender, positive bowel sounds   Back: negative, range of motion normal  Extremities: no ulcers, gangrene or trophic changes  Neurologic:  Cranial nerves grossly intact    Additional Data:   Labs:  Results from last 7 days   Lab Units 02/22/19  0605 02/21/19  0512   WBC Thousand/uL 12 93* 13 34*   HEMOGLOBIN g/dL 11 7 11 1*   HEMATOCRIT % 37 4 35 9   MCV fL 92 91   PLATELETS Thousands/uL 442* 363     Results from last 7 days   Lab Units 02/21/19  0512   SODIUM mmol/L 139   POTASSIUM mmol/L 3 7   CHLORIDE mmol/L 105   CO2 mmol/L 24   ANION GAP mmol/L 10   BUN mg/dL 25   CREATININE mg/dL 0 65   CALCIUM mg/dL 8 6   EGFR ml/min/1 73sq m 84   GLUCOSE RANDOM mg/dL 122                      Results from last 7 days   Lab Units 02/26/19  1552 02/26/19  1131 02/26/19  0733 02/25/19  2035 02/25/19  1615 02/25/19  1119 02/25/19  0739 02/24/19  2041 02/24/19  1622 02/24/19  1126 02/24/19  0718 02/23/19  2108   POC GLUCOSE mg/dl 127 216* 127 201* 129 110 126 150* 238* 158* 158* 189*             * I Have Reviewed All Lab Data Listed Above  Cultures:           Imaging:  Imaging Reports Reviewed Today Include:   No results found    Scheduled Meds:  Current Facility-Administered Medications:  acetaminophen 325 mg Oral Q8H PRN Niraj Prechtel, DO   ALPRAZolam 0 25 mg Oral TID PRN Ayan Hill MD   aspirin 81 mg Oral Daily Loc Hollingsworth MD   atorvastatin 20 mg Oral QPM Loc Hollingsworth MD   carvedilol 3 125 mg Oral BID With Meals Dheeraj Wang, DO   citalopram 10 mg Oral Daily Niraj Prechtel, DO   clopidogrel 75 mg Oral Daily Niraj Prechtel, DO   famotidine 20 mg Oral BID Niraj Prechtel, DO   insulin glargine 15 Units Subcutaneous HS Pearly Rafia, DO   insulin lispro 1-5 Units Subcutaneous HS Niraj Prechtel, DO   insulin lispro 1-6 Units Subcutaneous TID AC Niraj Prechtel, DO   lactobacillus acidophilus-bulgaricus 1 packet Oral TID With Meals Ayan Hill MD   lisinopril 2 5 mg Oral Daily Dheeraj Hall, DO   melatonin 6 mg Oral HS Trini Session, CRNP   nitroglycerin 0 4 mg Sublingual Q5 Min PRN Claudia Souza, DO       Jenn Garcia,   [facility]  Hospitalist    ** Please Note: This note has been constructed using a voice recognition system   **

## 2019-02-26 NOTE — PLAN OF CARE
Problem: Potential for Falls  Goal: Patient will remain free of falls  Description  INTERVENTIONS:  - Assess patient frequently for physical needs  -  Identify cognitive and physical deficits and behaviors that affect risk of falls    -  [place] fall precautions as indicated by assessment   - Educate patient/family on patient safety including physical limitations  - Instruct patient to call for assistance with activity based on assessment  - Modify environment to reduce risk of injury  - Consider OT/PT consult to assist with strengthening/mobility  Outcome: Progressing     Problem: Prexisting or High Potential for Compromised Skin Integrity  Goal: Skin integrity is maintained or improved  Description  INTERVENTIONS:  - Identify patients at risk for skin breakdown  - Assess and monitor skin integrity  - Assess and monitor nutrition and hydration status  - Monitor labs (i e  albumin)  - Assess for incontinence   - Turn and reposition patient  - Assist with mobility/ambulation  - Relieve pressure over bony prominences  - Avoid friction and shearing  - Provide appropriate hygiene as needed including keeping skin clean and dry  - Evaluate need for skin moisturizer/barrier cream  - Collaborate with interdisciplinary team (i e  Nutrition, Rehabilitation, etc )   - Patient/family teaching  Outcome: Progressing     Problem: PAIN - ADULT  Goal: Verbalizes/displays adequate comfort level or baseline comfort level  Description  Interventions:  - Encourage patient to monitor pain and request assistance  - Assess pain using appropriate pain scale  - Administer analgesics based on type and severity of pain and evaluate response  - Implement non-pharmacological measures as appropriate and evaluate response  - Consider cultural and social influences on pain and pain management  - Notify physician/advanced practitioner if interventions unsuccessful or patient reports new pain  Outcome: Progressing     Problem: SAFETY ADULT  Goal: Maintain or return to baseline ADL function  Description  INTERVENTIONS:  -  Assess patient's ability to carry out ADLs; assess patient's baseline for ADL function and identify physical deficits which impact ability to perform ADLs (bathing, care of mouth/teeth, toileting, grooming, dressing, etc )  - Assess/evaluate cause of self-care deficits   - Assess range of motion  - Assess patient's mobility; develop plan if impaired  - Assess patient's need for assistive devices and provide as appropriate  - Encourage maximum independence but intervene and supervise when necessary  ¯ Involve family in performance of ADLs  ¯ Assess for home care needs following discharge   ¯ Request OT consult to assist with ADL evaluation and planning for discharge  ¯ Provide patient education as appropriate  Outcome: Progressing  Goal: Maintain or return mobility status to optimal level  Description  INTERVENTIONS:  - Assess patient's baseline mobility status (ambulation, transfers, stairs, etc )    - Identify cognitive and physical deficits and behaviors that affect mobility  - Identify mobility aids required to assist with transfers and/or ambulation (gait belt, sit-to-stand, lift, walker, cane, etc )  - [place] fall precautions as indicated by assessment  - Record patient progress and toleration of activity level on Mobility SBAR; progress patient to next Phase/Stage  - Instruct patient to call for assistance with activity based on assessment  - Request Rehabilitation consult to assist with strengthening/weightbearing, etc   Outcome: Progressing     Problem: DISCHARGE PLANNING  Goal: Discharge to home or other facility with appropriate resources  Description  INTERVENTIONS:  - Identify barriers to discharge w/patient and caregiver  - Arrange for needed discharge resources and transportation as appropriate  - Identify discharge learning needs (meds, wound care, etc )  - Arrange for interpretive services to assist at discharge as needed  - Refer to Case Management Department for coordinating discharge planning if the patient needs post-hospital services based on physician/advanced practitioner order or complex needs related to functional status, cognitive ability, or social support system  Outcome: Progressing     Problem: Knowledge Deficit  Goal: Patient/family/caregiver demonstrates understanding of disease process, treatment plan, medications, and discharge instructions  Description  Complete learning assessment and assess knowledge base  Interventions:  - Provide teaching at level of understanding  - Provide teaching via preferred learning methods  Outcome: Progressing     Problem: CARDIOVASCULAR - ADULT  Goal: Maintains optimal cardiac output and hemodynamic stability  Description  INTERVENTIONS:  - Monitor I/O, vital signs and rhythm  - Monitor for S/S and trends of decreased cardiac output i e  bleeding, hypotension  - Assess quality of pulses, skin color and temperature  - Assess for signs of decreased coronary artery perfusion - ex   Angina  - Instruct patient to report change in severity of symptoms   Outcome: Progressing  Goal: Absence of cardiac dysrhythmias or at baseline rhythm  Description  INTERVENTIONS:  - Continuous cardiac monitoring, monitor vital signs, obtain 12 lead EKG if indicated  - Administer antiarrhythmic and heart rate control medications as ordered  - Monitor electrolytes and administer replacement therapy as ordered  Outcome: Progressing     Problem: SKIN/TISSUE INTEGRITY - ADULT  Goal: Skin integrity remains intact  Description  INTERVENTIONS  - Identify patients at risk for skin breakdown  - Assess and monitor skin integrity  - Assess and monitor nutrition and hydration status  - Assess for incontinence   - Turn and reposition patient  - Assist with mobility/ambulation  - Relieve pressure over bony prominences  - Avoid friction and shearing  - Provide appropriate hygiene as needed including keeping skin clean and dry  - Evaluate need for skin moisturizer/barrier cream  - Collaborate with interdisciplinary team (i e  Nutrition, Rehabilitation, etc )   - Patient/family teaching   Outcome: Progressing  Goal: Incision(s), wounds(s) or drain site(s) healing without S/S of infection  Description  INTERVENTIONS  - Assess and document risk factors for skin impairment   - Assess and document dressing, incision, wound bed, drain sites and surrounding tissue  - Initiate Nutrition services consult and/or wound management as needed  Outcome: Progressing  Goal: Oral mucous membranes remain intact  Outcome: Progressing     Problem: INFECTION - ADULT  Goal: Absence or prevention of progression during hospitalization  Description  INTERVENTIONS:  - Assess and monitor for signs and symptoms of infection  - Monitor lab/diagnostic results  - Monitor all insertion sites, i e  indwelling lines, tubes, and drains  - Monitor endotracheal (as able) and nasal secretions for changes in amount and color  - [place] appropriate cooling/warming therapies per order  - Administer medications as ordered  - Instruct and encourage patient and family to use good hand hygiene technique  - Identify and instruct in appropriate isolation precautions for identified infection/condition  Outcome: Progressing  Goal: Absence of fever/infection during neutropenic period  Description  INTERVENTIONS:  - Monitor WBC  - Implement neutropenic guidelines  Outcome: Progressing     Problem: NEUROSENSORY - ADULT  Goal: Achieves stable or improved neurological status  Description  INTERVENTIONS  - Monitor and report changes in neurological status  - Initiate measures to prevent increased intracranial pressure  - Maintain blood pressure and fluid volume within ordered parameters to optimize cerebral perfusion  - Monitor temperature, glucose, and sodium or any other associated labs   Initiate appropriate interventions as ordered  - Monitor for seizure activity   - Administer anti-seizure medications as ordered  Outcome: Progressing  Goal: Absence of seizures  Description  INTERVENTIONS  - Monitor for seizure activity  - Administer anti-seizure medications as ordered  - Monitor neurological status  Outcome: Progressing  Goal: Remains free of injury related to seizures activity  Description  INTERVENTIONS  - Maintain airway, patient safety  and administer oxygen as ordered  - Monitor patient for seizure activity, document and report duration and description of seizure to physician/advanced practitioner  - If seizure occurs,  ensure patient safety during seizure  - Reorient patient post seizure  - Seizure pads on all 4 side rails  - Instruct patient/family to notify RN of any seizure activity including if an aura is experienced  - Instruct patient/family to call for assistance with activity based on nursing assessment  - Administer anti-seizure medications as ordered  - Monitor fetal well being  Outcome: Progressing  Goal: Achieves maximal functionality and self care  Description  INTERVENTIONS  - Monitor swallowing and airway patency with patient fatigue and changes in neurological status  - Encourage and assist patient to increase activity and self care with guidance from rehab services  - Encourage visually impaired, hearing impaired and aphasic patients to use assistive/communication devices  Outcome: Progressing     Problem: RESPIRATORY - ADULT  Goal: Achieves optimal ventilation and oxygenation  Description  INTERVENTIONS:  - Assess for changes in respiratory status  - Assess for changes in mentation and behavior  - Position to facilitate oxygenation and minimize respiratory effort  - Oxygen administration by appropriate delivery method based on oxygen saturation (per order) or ABGs  - Initiate smoking cessation education as indicated  - Encourage broncho-pulmonary hygiene including cough, deep breathe, Incentive Spirometry  - Assess the need for suctioning and aspirate as needed  - Assess and instruct to report SOB or any respiratory difficulty  - Respiratory Therapy support as indicated  Outcome: Progressing     Problem: GENITOURINARY - ADULT  Goal: Maintains or returns to baseline urinary function  Description  INTERVENTIONS:  - Assess urinary function  - Encourage oral fluids to ensure adequate hydration  - Administer IV fluids as ordered to ensure adequate hydration  - Administer ordered medications as needed  - Offer frequent toileting  - Follow urinary retention protocol if ordered  Outcome: Progressing  Goal: Absence of urinary retention  Description  INTERVENTIONS:  - Assess patient?s ability to void and empty bladder  - Monitor I/O  - Bladder scan as needed  - Discuss with physician/AP medications to alleviate retention as needed  - Discuss catheterization for long term situations as appropriate  Outcome: Progressing  Goal: Urinary catheter remains patent  Description  INTERVENTIONS:  - Assess patency of urinary catheter  - If patient has a chronic vang, consider changing catheter if non-functioning  - Follow guidelines for intermittent irrigation of non-functioning urinary catheter  Outcome: Progressing     Problem: DISCHARGE PLANNING - CARE MANAGEMENT  Goal: Discharge to post-acute care or home with appropriate resources  Description  INTERVENTIONS:    PT/OT is recommending short stay rehab  TC to [facility] and her first choice is [facility] and then [facility] and [facility]  Referral were made  Insurance will need to auth stay  CM will f/u   RN reports friend Chaya Underwood brought in [place] paperwork listing her as POA for pt  Discharge plan at this time is home pending PT eval      CM will f/u with POA papers and discharge needs for pt         - Conduct assessment to determine patient/family and health care team treatment goals, and need for post-acute services based on payer coverage, community resources, and patient preferences, and barriers to discharge  - Address psychosocial, clinical, and financial barriers to discharge as identified in assessment in conjunction with the patient/family and health care team  - Arrange appropriate level of post-acute services according to patient's   needs and preference and payer coverage in collaboration with the physician and health care team  - Communicate with and update the patient/family, physician, and health care team regarding progress on the discharge plan  - Arrange appropriate transportation to post-acute venues     Outcome: Progressing

## 2019-02-27 NOTE — PLAN OF CARE
Problem: Potential for Falls  Goal: Patient will remain free of falls  Description  INTERVENTIONS:  - Assess patient frequently for physical needs  -  Identify cognitive and physical deficits and behaviors that affect risk of falls    -  [place] fall precautions as indicated by assessment   - Educate patient/family on patient safety including physical limitations  - Instruct patient to call for assistance with activity based on assessment  - Modify environment to reduce risk of injury  - Consider OT/PT consult to assist with strengthening/mobility  Outcome: Progressing     Problem: Prexisting or High Potential for Compromised Skin Integrity  Goal: Skin integrity is maintained or improved  Description  INTERVENTIONS:  - Identify patients at risk for skin breakdown  - Assess and monitor skin integrity  - Assess and monitor nutrition and hydration status  - Monitor labs (i e  albumin)  - Assess for incontinence   - Turn and reposition patient  - Assist with mobility/ambulation  - Relieve pressure over bony prominences  - Avoid friction and shearing  - Provide appropriate hygiene as needed including keeping skin clean and dry  - Evaluate need for skin moisturizer/barrier cream  - Collaborate with interdisciplinary team (i e  Nutrition, Rehabilitation, etc )   - Patient/family teaching  Outcome: Progressing     Problem: PAIN - ADULT  Goal: Verbalizes/displays adequate comfort level or baseline comfort level  Description  Interventions:  - Encourage patient to monitor pain and request assistance  - Assess pain using appropriate pain scale  - Administer analgesics based on type and severity of pain and evaluate response  - Implement non-pharmacological measures as appropriate and evaluate response  - Consider cultural and social influences on pain and pain management  - Notify physician/advanced practitioner if interventions unsuccessful or patient reports new pain  Outcome: Progressing     Problem: SAFETY ADULT  Goal: Maintain or return to baseline ADL function  Description  INTERVENTIONS:  -  Assess patient's ability to carry out ADLs; assess patient's baseline for ADL function and identify physical deficits which impact ability to perform ADLs (bathing, care of mouth/teeth, toileting, grooming, dressing, etc )  - Assess/evaluate cause of self-care deficits   - Assess range of motion  - Assess patient's mobility; develop plan if impaired  - Assess patient's need for assistive devices and provide as appropriate  - Encourage maximum independence but intervene and supervise when necessary  ¯ Involve family in performance of ADLs  ¯ Assess for home care needs following discharge   ¯ Request OT consult to assist with ADL evaluation and planning for discharge  ¯ Provide patient education as appropriate  Outcome: Progressing  Goal: Maintain or return mobility status to optimal level  Description  INTERVENTIONS:  - Assess patient's baseline mobility status (ambulation, transfers, stairs, etc )    - Identify cognitive and physical deficits and behaviors that affect mobility  - Identify mobility aids required to assist with transfers and/or ambulation (gait belt, sit-to-stand, lift, walker, cane, etc )  - [place] fall precautions as indicated by assessment  - Record patient progress and toleration of activity level on Mobility SBAR; progress patient to next Phase/Stage  - Instruct patient to call for assistance with activity based on assessment  - Request Rehabilitation consult to assist with strengthening/weightbearing, etc   Outcome: Progressing     Problem: DISCHARGE PLANNING  Goal: Discharge to home or other facility with appropriate resources  Description  INTERVENTIONS:  - Identify barriers to discharge w/patient and caregiver  - Arrange for needed discharge resources and transportation as appropriate  - Identify discharge learning needs (meds, wound care, etc )  - Arrange for interpretive services to assist at discharge as needed  - Refer to Case Management Department for coordinating discharge planning if the patient needs post-hospital services based on physician/advanced practitioner order or complex needs related to functional status, cognitive ability, or social support system  Outcome: Progressing     Problem: Knowledge Deficit  Goal: Patient/family/caregiver demonstrates understanding of disease process, treatment plan, medications, and discharge instructions  Description  Complete learning assessment and assess knowledge base  Interventions:  - Provide teaching at level of understanding  - Provide teaching via preferred learning methods  Outcome: Progressing     Problem: CARDIOVASCULAR - ADULT  Goal: Maintains optimal cardiac output and hemodynamic stability  Description  INTERVENTIONS:  - Monitor I/O, vital signs and rhythm  - Monitor for S/S and trends of decreased cardiac output i e  bleeding, hypotension  - Assess quality of pulses, skin color and temperature  - Assess for signs of decreased coronary artery perfusion - ex   Angina  - Instruct patient to report change in severity of symptoms   Outcome: Progressing  Goal: Absence of cardiac dysrhythmias or at baseline rhythm  Description  INTERVENTIONS:  - Continuous cardiac monitoring, monitor vital signs, obtain 12 lead EKG if indicated  - Administer antiarrhythmic and heart rate control medications as ordered  - Monitor electrolytes and administer replacement therapy as ordered  Outcome: Progressing     Problem: SKIN/TISSUE INTEGRITY - ADULT  Goal: Skin integrity remains intact  Description  INTERVENTIONS  - Identify patients at risk for skin breakdown  - Assess and monitor skin integrity  - Assess and monitor nutrition and hydration status  - Assess for incontinence   - Turn and reposition patient  - Assist with mobility/ambulation  - Relieve pressure over bony prominences  - Avoid friction and shearing  - Provide appropriate hygiene as needed including keeping skin clean and dry  - Evaluate need for skin moisturizer/barrier cream  - Collaborate with interdisciplinary team (i e  Nutrition, Rehabilitation, etc )   - Patient/family teaching   Outcome: Progressing  Goal: Incision(s), wounds(s) or drain site(s) healing without S/S of infection  Description  INTERVENTIONS  - Assess and document risk factors for skin impairment   - Assess and document dressing, incision, wound bed, drain sites and surrounding tissue  - Initiate Nutrition services consult and/or wound management as needed  Outcome: Progressing  Goal: Oral mucous membranes remain intact  Outcome: Progressing     Problem: INFECTION - ADULT  Goal: Absence or prevention of progression during hospitalization  Description  INTERVENTIONS:  - Assess and monitor for signs and symptoms of infection  - Monitor lab/diagnostic results  - Monitor all insertion sites, i e  indwelling lines, tubes, and drains  - Monitor endotracheal (as able) and nasal secretions for changes in amount and color  - [place] appropriate cooling/warming therapies per order  - Administer medications as ordered  - Instruct and encourage patient and family to use good hand hygiene technique  - Identify and instruct in appropriate isolation precautions for identified infection/condition  Outcome: Progressing  Goal: Absence of fever/infection during neutropenic period  Description  INTERVENTIONS:  - Monitor WBC  - Implement neutropenic guidelines  Outcome: Progressing     Problem: NEUROSENSORY - ADULT  Goal: Achieves stable or improved neurological status  Description  INTERVENTIONS  - Monitor and report changes in neurological status  - Initiate measures to prevent increased intracranial pressure  - Maintain blood pressure and fluid volume within ordered parameters to optimize cerebral perfusion  - Monitor temperature, glucose, and sodium or any other associated labs   Initiate appropriate interventions as ordered  - Monitor for seizure activity   - Administer anti-seizure medications as ordered  Outcome: Progressing  Goal: Absence of seizures  Description  INTERVENTIONS  - Monitor for seizure activity  - Administer anti-seizure medications as ordered  - Monitor neurological status  Outcome: Progressing  Goal: Remains free of injury related to seizures activity  Description  INTERVENTIONS  - Maintain airway, patient safety  and administer oxygen as ordered  - Monitor patient for seizure activity, document and report duration and description of seizure to physician/advanced practitioner  - If seizure occurs,  ensure patient safety during seizure  - Reorient patient post seizure  - Seizure pads on all 4 side rails  - Instruct patient/family to notify RN of any seizure activity including if an aura is experienced  - Instruct patient/family to call for assistance with activity based on nursing assessment  - Administer anti-seizure medications as ordered  - Monitor fetal well being  Outcome: Progressing  Goal: Achieves maximal functionality and self care  Description  INTERVENTIONS  - Monitor swallowing and airway patency with patient fatigue and changes in neurological status  - Encourage and assist patient to increase activity and self care with guidance from rehab services  - Encourage visually impaired, hearing impaired and aphasic patients to use assistive/communication devices  Outcome: Progressing     Problem: RESPIRATORY - ADULT  Goal: Achieves optimal ventilation and oxygenation  Description  INTERVENTIONS:  - Assess for changes in respiratory status  - Assess for changes in mentation and behavior  - Position to facilitate oxygenation and minimize respiratory effort  - Oxygen administration by appropriate delivery method based on oxygen saturation (per order) or ABGs  - Initiate smoking cessation education as indicated  - Encourage broncho-pulmonary hygiene including cough, deep breathe, Incentive Spirometry  - Assess the need for suctioning and aspirate as needed  - Assess and instruct to report SOB or any respiratory difficulty  - Respiratory Therapy support as indicated  Outcome: Progressing     Problem: GENITOURINARY - ADULT  Goal: Maintains or returns to baseline urinary function  Description  INTERVENTIONS:  - Assess urinary function  - Encourage oral fluids to ensure adequate hydration  - Administer IV fluids as ordered to ensure adequate hydration  - Administer ordered medications as needed  - Offer frequent toileting  - Follow urinary retention protocol if ordered  Outcome: Progressing  Goal: Absence of urinary retention  Description  INTERVENTIONS:  - Assess patient?s ability to void and empty bladder  - Monitor I/O  - Bladder scan as needed  - Discuss with physician/AP medications to alleviate retention as needed  - Discuss catheterization for long term situations as appropriate  Outcome: Progressing  Goal: Urinary catheter remains patent  Description  INTERVENTIONS:  - Assess patency of urinary catheter  - If patient has a chronic vang, consider changing catheter if non-functioning  - Follow guidelines for intermittent irrigation of non-functioning urinary catheter  Outcome: Progressing     Problem: DISCHARGE PLANNING - CARE MANAGEMENT  Goal: Discharge to post-acute care or home with appropriate resources  Description  INTERVENTIONS:    [facility] when discharge  PT/OT is recommending short stay rehab  TC to [facility] and her first choice is [facility] and then [facility] and [address] [place] [address]  Referral were made  Insurance will need to auth stay  CM will f/u   RN reports friend Marito Platt brought in [place] paperwork listing her as POA for pt  Discharge plan at this time is home pending PT eval      CM will f/u with POA papers and discharge needs for pt         - Conduct assessment to determine patient/family and health care team treatment goals, and need for post-acute services based on payer coverage, community resources, and patient preferences, and barriers to discharge  - Address psychosocial, clinical, and financial barriers to discharge as identified in assessment in conjunction with the patient/family and health care team  - Arrange appropriate level of post-acute services according to patient's   needs and preference and payer coverage in collaboration with the physician and health care team  - Communicate with and update the patient/family, physician, and health care team regarding progress on the discharge plan  - Arrange appropriate transportation to post-acute venues      Outcome: Progressing

## 2019-02-27 NOTE — TREATMENT PLAN
Pt was found unresponsive, not breathing, and no pulse  cpr was not started as pt was a dnr/dni  On exam there was no audible breath sounds or heart beat  No response to noxious stimuli  No pulse  Pt was pronounced dead at 80 am  She has no living family  Her noemia Nkechi Nam was notified

## 2019-02-27 NOTE — DEATH NOTE
INPATIENT DEATH NOTE  Marianna Kevin [de-identified] y o  female MRN: [id number]  Unit/Bed#: E4 -01 Encounter: [id number]    Date, Time and Cause of Death    Date of Death:  19  Time of Death:   7:24 AM  Preliminary Cause of Death:  Cardiac arrest        Patient's Information  Pronounced by: Dr Nadeen Aragon  Did the patient's death occur in the ED?: No  Did the patient's death occur in the OR?: No  Did the patient's death occur less than 10 days post-op?: No  Did the patient's death occur within 24 hours of admission?: No  Was code status DNR at the time of death?: Yes    Family Notification  Was the family notified?: Yes  Date Notified: 19  Time Notified:   Notified by: Dr Nadeen Aragon  Name of Family Notified of Death: Josephpeter Paredes    Relationship to Patient: Friend, Other (Comment)(POA)  Family Notification Route: Telephone  Was the family told to contact a  home?: Yes  Name of [place] [id number][de-identified] [facility] in [place]

## 2019-02-27 NOTE — ASSESSMENT & PLAN NOTE
Acute ST-elevation myocardial infarction declined cardiac catheterization with post infarction pericarditis  Patient was placed on maximal medical therapy including  aspirin, clopidogrel, and carvedilol during hospitalization

## 2019-02-27 NOTE — PLAN OF CARE
Problem: Potential for Falls  Goal: Patient will remain free of falls  Description  INTERVENTIONS:  - Assess patient frequently for physical needs  -  Identify cognitive and physical deficits and behaviors that affect risk of falls    -  [place] fall precautions as indicated by assessment   - Educate patient/family on patient safety including physical limitations  - Instruct patient to call for assistance with activity based on assessment  - Modify environment to reduce risk of injury  - Consider OT/PT consult to assist with strengthening/mobility  Outcome: Progressing     Problem: Prexisting or High Potential for Compromised Skin Integrity  Goal: Skin integrity is maintained or improved  Description  INTERVENTIONS:  - Identify patients at risk for skin breakdown  - Assess and monitor skin integrity  - Assess and monitor nutrition and hydration status  - Monitor labs (i e  albumin)  - Assess for incontinence   - Turn and reposition patient  - Assist with mobility/ambulation  - Relieve pressure over bony prominences  - Avoid friction and shearing  - Provide appropriate hygiene as needed including keeping skin clean and dry  - Evaluate need for skin moisturizer/barrier cream  - Collaborate with interdisciplinary team (i e  Nutrition, Rehabilitation, etc )   - Patient/family teaching  Outcome: Progressing     Problem: PAIN - ADULT  Goal: Verbalizes/displays adequate comfort level or baseline comfort level  Description  Interventions:  - Encourage patient to monitor pain and request assistance  - Assess pain using appropriate pain scale  - Administer analgesics based on type and severity of pain and evaluate response  - Implement non-pharmacological measures as appropriate and evaluate response  - Consider cultural and social influences on pain and pain management  - Notify physician/advanced practitioner if interventions unsuccessful or patient reports new pain  Outcome: Progressing     Problem: SAFETY ADULT  Goal: Maintain or return to baseline ADL function  Description  INTERVENTIONS:  -  Assess patient's ability to carry out ADLs; assess patient's baseline for ADL function and identify physical deficits which impact ability to perform ADLs (bathing, care of mouth/teeth, toileting, grooming, dressing, etc )  - Assess/evaluate cause of self-care deficits   - Assess range of motion  - Assess patient's mobility; develop plan if impaired  - Assess patient's need for assistive devices and provide as appropriate  - Encourage maximum independence but intervene and supervise when necessary  ¯ Involve family in performance of ADLs  ¯ Assess for home care needs following discharge   ¯ Request OT consult to assist with ADL evaluation and planning for discharge  ¯ Provide patient education as appropriate  Outcome: Progressing  Goal: Maintain or return mobility status to optimal level  Description  INTERVENTIONS:  - Assess patient's baseline mobility status (ambulation, transfers, stairs, etc )    - Identify cognitive and physical deficits and behaviors that affect mobility  - Identify mobility aids required to assist with transfers and/or ambulation (gait belt, sit-to-stand, lift, walker, cane, etc )  - [place] fall precautions as indicated by assessment  - Record patient progress and toleration of activity level on Mobility SBAR; progress patient to next Phase/Stage  - Instruct patient to call for assistance with activity based on assessment  - Request Rehabilitation consult to assist with strengthening/weightbearing, etc   Outcome: Progressing     Problem: DISCHARGE PLANNING  Goal: Discharge to home or other facility with appropriate resources  Description  INTERVENTIONS:  - Identify barriers to discharge w/patient and caregiver  - Arrange for needed discharge resources and transportation as appropriate  - Identify discharge learning needs (meds, wound care, etc )  - Arrange for interpretive services to assist at discharge as needed  - Refer to Case Management Department for coordinating discharge planning if the patient needs post-hospital services based on physician/advanced practitioner order or complex needs related to functional status, cognitive ability, or social support system  Outcome: Progressing     Problem: Knowledge Deficit  Goal: Patient/family/caregiver demonstrates understanding of disease process, treatment plan, medications, and discharge instructions  Description  Complete learning assessment and assess knowledge base  Interventions:  - Provide teaching at level of understanding  - Provide teaching via preferred learning methods  Outcome: Progressing     Problem: CARDIOVASCULAR - ADULT  Goal: Maintains optimal cardiac output and hemodynamic stability  Description  INTERVENTIONS:  - Monitor I/O, vital signs and rhythm  - Monitor for S/S and trends of decreased cardiac output i e  bleeding, hypotension  - Assess quality of pulses, skin color and temperature  - Assess for signs of decreased coronary artery perfusion - ex   Angina  - Instruct patient to report change in severity of symptoms   Outcome: Progressing  Goal: Absence of cardiac dysrhythmias or at baseline rhythm  Description  INTERVENTIONS:  - Continuous cardiac monitoring, monitor vital signs, obtain 12 lead EKG if indicated  - Administer antiarrhythmic and heart rate control medications as ordered  - Monitor electrolytes and administer replacement therapy as ordered  Outcome: Progressing     Problem: SKIN/TISSUE INTEGRITY - ADULT  Goal: Skin integrity remains intact  Description  INTERVENTIONS  - Identify patients at risk for skin breakdown  - Assess and monitor skin integrity  - Assess and monitor nutrition and hydration status  - Assess for incontinence   - Turn and reposition patient  - Assist with mobility/ambulation  - Relieve pressure over bony prominences  - Avoid friction and shearing  - Provide appropriate hygiene as needed including keeping skin clean and dry  - Evaluate need for skin moisturizer/barrier cream  - Collaborate with interdisciplinary team (i e  Nutrition, Rehabilitation, etc )   - Patient/family teaching   Outcome: Progressing  Goal: Incision(s), wounds(s) or drain site(s) healing without S/S of infection  Description  INTERVENTIONS  - Assess and document risk factors for skin impairment   - Assess and document dressing, incision, wound bed, drain sites and surrounding tissue  - Initiate Nutrition services consult and/or wound management as needed  Outcome: Progressing  Goal: Oral mucous membranes remain intact  Outcome: Progressing     Problem: INFECTION - ADULT  Goal: Absence or prevention of progression during hospitalization  Description  INTERVENTIONS:  - Assess and monitor for signs and symptoms of infection  - Monitor lab/diagnostic results  - Monitor all insertion sites, i e  indwelling lines, tubes, and drains  - Monitor endotracheal (as able) and nasal secretions for changes in amount and color  - [place] appropriate cooling/warming therapies per order  - Administer medications as ordered  - Instruct and encourage patient and family to use good hand hygiene technique  - Identify and instruct in appropriate isolation precautions for identified infection/condition  Outcome: Progressing  Goal: Absence of fever/infection during neutropenic period  Description  INTERVENTIONS:  - Monitor WBC  - Implement neutropenic guidelines  Outcome: Progressing     Problem: NEUROSENSORY - ADULT  Goal: Achieves stable or improved neurological status  Description  INTERVENTIONS  - Monitor and report changes in neurological status  - Initiate measures to prevent increased intracranial pressure  - Maintain blood pressure and fluid volume within ordered parameters to optimize cerebral perfusion  - Monitor temperature, glucose, and sodium or any other associated labs   Initiate appropriate interventions as ordered  - Monitor for seizure activity   - Administer anti-seizure medications as ordered  Outcome: Progressing  Goal: Absence of seizures  Description  INTERVENTIONS  - Monitor for seizure activity  - Administer anti-seizure medications as ordered  - Monitor neurological status  Outcome: Progressing  Goal: Remains free of injury related to seizures activity  Description  INTERVENTIONS  - Maintain airway, patient safety  and administer oxygen as ordered  - Monitor patient for seizure activity, document and report duration and description of seizure to physician/advanced practitioner  - If seizure occurs,  ensure patient safety during seizure  - Reorient patient post seizure  - Seizure pads on all 4 side rails  - Instruct patient/family to notify RN of any seizure activity including if an aura is experienced  - Instruct patient/family to call for assistance with activity based on nursing assessment  - Administer anti-seizure medications as ordered  - Monitor fetal well being  Outcome: Progressing  Goal: Achieves maximal functionality and self care  Description  INTERVENTIONS  - Monitor swallowing and airway patency with patient fatigue and changes in neurological status  - Encourage and assist patient to increase activity and self care with guidance from rehab services  - Encourage visually impaired, hearing impaired and aphasic patients to use assistive/communication devices  Outcome: Progressing     Problem: RESPIRATORY - ADULT  Goal: Achieves optimal ventilation and oxygenation  Description  INTERVENTIONS:  - Assess for changes in respiratory status  - Assess for changes in mentation and behavior  - Position to facilitate oxygenation and minimize respiratory effort  - Oxygen administration by appropriate delivery method based on oxygen saturation (per order) or ABGs  - Initiate smoking cessation education as indicated  - Encourage broncho-pulmonary hygiene including cough, deep breathe, Incentive Spirometry  - Assess the need for suctioning and aspirate as needed  - Assess and instruct to report SOB or any respiratory difficulty  - Respiratory Therapy support as indicated  Outcome: Progressing     Problem: GENITOURINARY - ADULT  Goal: Maintains or returns to baseline urinary function  Description  INTERVENTIONS:  - Assess urinary function  - Encourage oral fluids to ensure adequate hydration  - Administer IV fluids as ordered to ensure adequate hydration  - Administer ordered medications as needed  - Offer frequent toileting  - Follow urinary retention protocol if ordered  Outcome: Progressing  Goal: Absence of urinary retention  Description  INTERVENTIONS:  - Assess patient?s ability to void and empty bladder  - Monitor I/O  - Bladder scan as needed  - Discuss with physician/AP medications to alleviate retention as needed  - Discuss catheterization for long term situations as appropriate  Outcome: Progressing  Goal: Urinary catheter remains patent  Description  INTERVENTIONS:  - Assess patency of urinary catheter  - If patient has a chronic vang, consider changing catheter if non-functioning  - Follow guidelines for intermittent irrigation of non-functioning urinary catheter  Outcome: Progressing     Problem: DISCHARGE PLANNING - CARE MANAGEMENT  Goal: Discharge to post-acute care or home with appropriate resources  Description  INTERVENTIONS:    [facility] when discharge  PT/OT is recommending short stay rehab  TC to [facility] and her first choice is Son and then [facility] and [facility]  Referral were made  Insurance will need to auth stay  CM will f/u   RN reports friend Mark Wahl brought in [place] paperwork listing her as POA for pt  Discharge plan at this time is home pending PT eval      CM will f/u with POA papers and discharge needs for pt         - Conduct assessment to determine patient/family and health care team treatment goals, and need for post-acute services based on payer coverage, community resources, and patient preferences, and barriers to discharge  - Address psychosocial, clinical, and financial barriers to discharge as identified in assessment in conjunction with the patient/family and health care team  - Arrange appropriate level of post-acute services according to patient's   needs and preference and payer coverage in collaboration with the physician and health care team  - Communicate with and update the patient/family, physician, and health care team regarding progress on the discharge plan  - Arrange appropriate transportation to post-acute venues      Outcome: Progressing

## 2019-02-27 NOTE — DISCHARGE SUMMARY
[address] Internal Medicine  Death summary- Vianey [place] 1940, [de-identified] y o  female MRN: [id number]  Unit/Bed#: E4 -01 Encounter: [id number]  Primary Care Provider: Billy Donato MD   Date and time admitted to hospital: 2/17/2019  4:17 PM      Admitting Provider:  Zhen Landis DO  Discharge Provider:  Zhen Landis DO  Admission Date: 2/17/2019       Discharge Date: 02/27/19   LOS: 10  Primary Care Physician at Discharge: Billy Donato [phone]    PRINCIPAL CAUSE OF DEATH:   CARDIAC ARREST    DISCHARGE DIAGNOSES DURING HOSPITALIZATION  Principal Problem:    Acute ST elevation myocardial infarction (STEMI) [facility])  Active Problems:    Type 2 diabetes mellitus, with long-term current use of insulin ([facility] )    Memory impairment    UTI (urinary tract infection)    Acute combined systolic (congestive) and diastolic (congestive) heart failure (HCC)    Hypotension    Anxiety    Leukocytosis    Pericarditis  Resolved Problems:    * No resolved hospital problems  *    HOSPITAL COURSE:   Vianey Nolasco is a [de-identified] y o  female who presented to the hospital with chest pain and fatigue  She does live alone and does not have any close family as she was never  without children  Her caretaker/friend advised her to come to the emergency department where she was found have STEMI with significant elevated troponin (as high as 39 2)  She requested to be a DNR and did not want cardiac catheterization  She had hospitalization course of 10 days which included adjustment of medications for maximal medical therapy  Unfortunately today while walking back from bathroom the patient arrested and passed peacefully  POA was notified    [facility] Santamaria cardiology    PROCEDURES PERFORMED  Echocardiogram  Result Date: 2/18/2019  Impression:  LV EF 28% with akinesis of apical walls and severe hypokinesis of entire anterior and inferior septal walls      RADIOLOGY RESULTS  Xr Chest 2 Views  Result Date: 2019  Impression: No acute cardiopulmonary disease  Workstation performed: [id number]       LABS  Results from last 7 days   Lab Units 19  0605 19  0512   WBC Thousand/uL 12 93* 13 34*   HEMOGLOBIN g/dL 11 7 11 1*   HEMATOCRIT % 37 4 35 9   MCV fL 92 91   PLATELETS Thousands/uL 442* 363     Results from last 7 days   Lab Units 19  0512   SODIUM mmol/L 139   POTASSIUM mmol/L 3 7   CHLORIDE mmol/L 105   CO2 mmol/L 24   BUN mg/dL 25   CREATININE mg/dL 0 65   CALCIUM mg/dL 8 6   EGFR ml/min/1 73sq m 84   GLUCOSE RANDOM mg/dL 122           Results from last 7 days   Lab Units 19  2053 19  1552 19  1131 19  0733 19  2035 19  1615 19  1119 19  0739 19  2041 19  1622   POC GLUCOSE mg/dl 141* 127 216* 127 201* 129 110 126 150* 238*     Discharge Disposition:   Discharge Condition:   Discharge Statement   I spent 40 minutes discharging the patient  This time was spent on the day of discharge  Greater than 50% of total time was spent with the patient and / or family counseling and / or coordination of care  ** Please Note: This note has been constructed using a voice recognition system   **

## 2022-06-29 NOTE — PLAN OF CARE
Problem: OCCUPATIONAL THERAPY ADULT  Goal: Performs self-care activities at highest level of function for planned discharge setting  See evaluation for individualized goals  Treatment Interventions: ADL retraining, Functional transfer training, UE strengthening/ROM, Endurance training, Cognitive reorientation, Patient/family training, Equipment evaluation/education, Compensatory technique education, Continued evaluation          See flowsheet documentation for full assessment, interventions and recommendations  Outcome: Progressing  Limitation: Decreased ADL status, Decreased UE strength, Decreased Safe judgement during ADL, Decreased cognition, Decreased endurance, Decreased high-level ADLs  Prognosis: Fair  Assessment: Pt was seen for skilled OT with focus on completion of self care tasks, bed mobility, review of current plan of care and completion of the [facility] Cognitive Level Screening  Pt scored 4 0/6 0 on the ACLS indicating 24 hour supervision is recommended to remove dangerous objects and solve problems due to minor changes in routine activities  Behavior:  May be disoriented to day/date  Memorization of new tasks will be extremely slow  Long term repetitive training is required for all new tasks  Allow 2-3x average rate for completion of tasks  Medications:  Initiates taking familiar dose of medication at regular time of day  Recognizes prescriptions and may recognize a problem, but will not seek assistance  May not understand need or purpose of medications  Changes in routine will confuse the individual and disrupt compliance  Provide frequent supervision  Pt will benefit from further rehab with focus on achieving optmal performance levels with all functional tasks        OT Discharge Recommendation: Short Term Rehab         Comments: Donn Taylor, [address] [de-identified] : 48 years old female s/p gastric bypass in 2014 at [facility]...\par \par Patient is now followed with Dr. Anne... She is menopausal since 2014... Most recently she was found to have iron deficiency anemia with hemoglobin of 10.8 g/dL... She is taking p.o. iron...\par \par She had several upper endoscopies which are in our system... Patient states she had an outside colonoscopy for which she will bring us results...

## 2023-03-10 NOTE — PHYSICAL THERAPY NOTE
Alisha CLEMENTE - Registered Nurse         PHYSICAL THERAPY NOTE          Patient Name: Radha TYLER Date: 1/19/2018 01/19/18 1145   Pain Assessment   Pain Location Back   Hospital Pain Intervention(s) Ambulation/increased activity;Repositioned   Response to Interventions tolerated   Pain Rating: FLACC (Rest) - Face 0   Pain Rating: FLACC (Rest) - Legs 0   Pain Rating: FLACC (Rest) - Activity 0   Pain Rating: FLACC (Rest) - Cry 1   Pain Rating: FLACC (Rest) - Consolability 0   Score: FLACC (Rest) 1   Restrictions/Precautions   Weight Bearing Precautions Per Order No   Other Precautions Chair Alarm; Bed Alarm; Fall Risk;Pain   General   Chart Reviewed Yes   Response to Previous Treatment Patient with no complaints from previous session  Family/Caregiver Present No   Subjective   Subjective Agreeable to therapy  "Aren't they getting me out of here soon"   Bed Mobility   Supine to Sit 4  Minimal assistance   Additional items Assist x 1; Increased time required;Verbal cues; Bedrails   Additional Comments Increased time to complete transition  Transfers   Sit to Stand 4  Minimal assistance   Additional items Assist x 1; Increased time required;Verbal cues  (despite cues for hand placement, pulls up on RW)   Stand to Sit 4  Minimal assistance   Additional items Assist x 1; Increased time required;Verbal cues;Armrests  (cues for hand placement)   Additional Comments Increased time to steady  Ambulation/Elevation   Gait pattern Improper Weight shift; Forward Flexion;Decreased foot clearance; Short stride; Step to;Excessively slow; Inconsistent prateek   Gait Assistance 4  Minimal assist   Additional items Assist x 1;Verbal cues   Assistive Device Rolling walker   Distance Amb with RW 20'x1   Balance   Static Sitting Fair +   Dynamic Sitting Fair   Static Standing Fair -   Dynamic Standing Poor   Ambulatory Poor +   Endurance Deficit   Endurance Deficit Yes   Endurance Deficit Description fatigue  Activity Tolerance   Activity Tolerance Patient limited by fatigue   Medical Staff Made Aware NurseJose Antonio   Nurse Made Aware yes   Exercises   Hip Flexion Sitting;AROM; Bilateral;15 reps   Hip Abduction Sitting;15 reps;AROM; Bilateral   Hip Adduction Sitting;15 reps;AROM; Bilateral   Knee AROM Long Arc Quad Sitting;15 reps;AROM; Bilateral   Ankle Pumps Sitting;15 reps;AROM; Bilateral   Assessment   Prognosis Fair   Problem List Decreased strength;Decreased endurance; Impaired balance;Decreased mobility; Decreased safety awareness; Impaired judgement;Pain   Assessment Less assistance for all tasks  Stacie for bed mobiltiy, transfers and ambulation  Cues needed for hand placement for safety wtih transitions  Able to ambulate 20 ft with Stacie, gait with deviations of increased forward flexion, decresed foot clearance  Cues to stay within ZHANNA of RW  Tolerated seated ther ex with increased reps  As pt lives alone will continue to require STR at d/c due to impairments in strength, balance, activity tolerance and mobilty  Goals   Patient Goals get stronger   STG Expiration Date 01/26/18   Short Term Goal #1 Continue wtih same goals in 7 days  Treatment Day 3   Plan   Treatment/Interventions Functional transfer training;LE strengthening/ROM; Therapeutic exercise;Elevations; Endurance training;Patient/family training;Equipment eval/education; Bed mobility;Gait training; Compensatory technique education;Continued evaluation;Spoke to nursing   Progress Progressing toward goals   PT Frequency 5x/wk   Recommendation   Recommendation Post acute IP rehab;Short-term skilled PT   Equipment Recommended Walker   PT - OK to Discharge Yes  (to rhb when medically clear )   Marylen Cheek, PT